# Patient Record
Sex: MALE | Race: WHITE | NOT HISPANIC OR LATINO | Employment: OTHER | URBAN - METROPOLITAN AREA
[De-identification: names, ages, dates, MRNs, and addresses within clinical notes are randomized per-mention and may not be internally consistent; named-entity substitution may affect disease eponyms.]

---

## 2017-01-17 PROBLEM — Z13.9 SCREENING: Status: ACTIVE | Noted: 2017-01-17

## 2019-02-04 PROBLEM — I10 ESSENTIAL HYPERTENSION: Status: ACTIVE | Noted: 2019-02-04

## 2019-02-04 PROBLEM — E78.5 HYPERLIPIDEMIA: Status: ACTIVE | Noted: 2019-02-04

## 2020-01-06 PROBLEM — Z13.9 ENCOUNTER FOR HEALTH-RELATED SCREENING: Status: RESOLVED | Noted: 2017-01-17 | Resolved: 2020-01-06

## 2022-01-27 ENCOUNTER — TELEPHONE (OUTPATIENT)
Dept: FAMILY MEDICINE | Facility: CLINIC | Age: 62
End: 2022-01-27
Payer: COMMERCIAL

## 2022-01-31 PROBLEM — D68.9 COAGULATION DEFECT: Status: ACTIVE | Noted: 2022-01-31

## 2022-01-31 PROBLEM — R07.9 CHEST PAIN, MODERATE CORONARY ARTERY RISK: Status: ACTIVE | Noted: 2022-01-31

## 2022-01-31 PROBLEM — J12.82 PNEUMONIA DUE TO COVID-19 VIRUS: Status: ACTIVE | Noted: 2022-01-31

## 2022-01-31 PROBLEM — U07.1 PNEUMONIA DUE TO COVID-19 VIRUS: Status: ACTIVE | Noted: 2022-01-31

## 2022-02-01 DIAGNOSIS — R07.9 CHEST PAIN, MODERATE CORONARY ARTERY RISK: Primary | ICD-10-CM

## 2022-02-22 ENCOUNTER — OFFICE VISIT (OUTPATIENT)
Dept: SPINE | Facility: CLINIC | Age: 62
End: 2022-02-22
Payer: COMMERCIAL

## 2022-02-22 VITALS
BODY MASS INDEX: 42.46 KG/M2 | DIASTOLIC BLOOD PRESSURE: 79 MMHG | HEART RATE: 58 BPM | SYSTOLIC BLOOD PRESSURE: 148 MMHG | HEIGHT: 68 IN | WEIGHT: 280.19 LBS

## 2022-02-22 DIAGNOSIS — Z98.890 H/O CERVICAL SPINE SURGERY: Primary | ICD-10-CM

## 2022-02-22 DIAGNOSIS — M50.30 DDD (DEGENERATIVE DISC DISEASE), CERVICAL: ICD-10-CM

## 2022-02-22 DIAGNOSIS — M54.12 CERVICAL RADICULOPATHY: ICD-10-CM

## 2022-02-22 PROCEDURE — 1159F MED LIST DOCD IN RCRD: CPT | Mod: CPTII,S$GLB,, | Performed by: PHYSICIAN ASSISTANT

## 2022-02-22 PROCEDURE — 1160F RVW MEDS BY RX/DR IN RCRD: CPT | Mod: CPTII,S$GLB,, | Performed by: PHYSICIAN ASSISTANT

## 2022-02-22 PROCEDURE — 3044F PR MOST RECENT HEMOGLOBIN A1C LEVEL <7.0%: ICD-10-PCS | Mod: CPTII,S$GLB,, | Performed by: PHYSICIAN ASSISTANT

## 2022-02-22 PROCEDURE — 3077F PR MOST RECENT SYSTOLIC BLOOD PRESSURE >= 140 MM HG: ICD-10-PCS | Mod: CPTII,S$GLB,, | Performed by: PHYSICIAN ASSISTANT

## 2022-02-22 PROCEDURE — 99203 PR OFFICE/OUTPT VISIT, NEW, LEVL III, 30-44 MIN: ICD-10-PCS | Mod: S$GLB,,, | Performed by: PHYSICIAN ASSISTANT

## 2022-02-22 PROCEDURE — 4010F ACE/ARB THERAPY RXD/TAKEN: CPT | Mod: CPTII,S$GLB,, | Performed by: PHYSICIAN ASSISTANT

## 2022-02-22 PROCEDURE — 99999 PR PBB SHADOW E&M-EST. PATIENT-LVL V: ICD-10-PCS | Mod: PBBFAC,,, | Performed by: PHYSICIAN ASSISTANT

## 2022-02-22 PROCEDURE — 3044F HG A1C LEVEL LT 7.0%: CPT | Mod: CPTII,S$GLB,, | Performed by: PHYSICIAN ASSISTANT

## 2022-02-22 PROCEDURE — 3078F PR MOST RECENT DIASTOLIC BLOOD PRESSURE < 80 MM HG: ICD-10-PCS | Mod: CPTII,S$GLB,, | Performed by: PHYSICIAN ASSISTANT

## 2022-02-22 PROCEDURE — 3078F DIAST BP <80 MM HG: CPT | Mod: CPTII,S$GLB,, | Performed by: PHYSICIAN ASSISTANT

## 2022-02-22 PROCEDURE — 99203 OFFICE O/P NEW LOW 30 MIN: CPT | Mod: S$GLB,,, | Performed by: PHYSICIAN ASSISTANT

## 2022-02-22 PROCEDURE — 3077F SYST BP >= 140 MM HG: CPT | Mod: CPTII,S$GLB,, | Performed by: PHYSICIAN ASSISTANT

## 2022-02-22 PROCEDURE — 3008F BODY MASS INDEX DOCD: CPT | Mod: CPTII,S$GLB,, | Performed by: PHYSICIAN ASSISTANT

## 2022-02-22 PROCEDURE — 3008F PR BODY MASS INDEX (BMI) DOCUMENTED: ICD-10-PCS | Mod: CPTII,S$GLB,, | Performed by: PHYSICIAN ASSISTANT

## 2022-02-22 PROCEDURE — 1159F PR MEDICATION LIST DOCUMENTED IN MEDICAL RECORD: ICD-10-PCS | Mod: CPTII,S$GLB,, | Performed by: PHYSICIAN ASSISTANT

## 2022-02-22 PROCEDURE — 4010F PR ACE/ARB THEARPY RXD/TAKEN: ICD-10-PCS | Mod: CPTII,S$GLB,, | Performed by: PHYSICIAN ASSISTANT

## 2022-02-22 PROCEDURE — 99999 PR PBB SHADOW E&M-EST. PATIENT-LVL V: CPT | Mod: PBBFAC,,, | Performed by: PHYSICIAN ASSISTANT

## 2022-02-22 PROCEDURE — 1160F PR REVIEW ALL MEDS BY PRESCRIBER/CLIN PHARMACIST DOCUMENTED: ICD-10-PCS | Mod: CPTII,S$GLB,, | Performed by: PHYSICIAN ASSISTANT

## 2022-02-22 NOTE — PROGRESS NOTES
Back and Spine Consult    Patient ID: Oc Solis is a 61 y.o. male.    Chief Complaint   Patient presents with    Neck Pain     Surgery 2000. cervical radiculopathy, DDD cervical.       Review of Systems   Constitutional: Negative for activity change, chills, fatigue and unexpected weight change.   HENT: Negative for hearing loss, tinnitus, trouble swallowing and voice change.    Eyes: Negative for visual disturbance.   Respiratory: Negative for apnea, chest tightness and shortness of breath.    Cardiovascular: Negative for chest pain and palpitations.   Gastrointestinal: Negative for abdominal pain, constipation, diarrhea, nausea and vomiting.   Genitourinary: Negative for difficulty urinating, dysuria and frequency.   Musculoskeletal: Positive for myalgias and neck pain. Negative for back pain, gait problem and neck stiffness.   Skin: Negative for wound.   Neurological: Positive for numbness. Negative for dizziness, tremors, seizures, facial asymmetry, speech difficulty, weakness, light-headedness and headaches.   Psychiatric/Behavioral: Negative for confusion and decreased concentration.       Past Medical History:   Diagnosis Date    Anxiety     Asthma     childhood    Candidiasis of skin and nails     Hyperglycemia     Hyperlipidemia     Hypertension     Laceration     Neck pain     Rectal/anal hemorrhage     Unspecified adverse effect of unspecified drug, medicinal and biological substance      Social History     Socioeconomic History    Marital status:    Tobacco Use    Smoking status: Never Smoker    Smokeless tobacco: Never Used   Substance and Sexual Activity    Alcohol use: Yes     Comment: 1 bottle of wine per week now pt reports    Drug use: No    Sexual activity: Yes     Partners: Female     Family History   Problem Relation Age of Onset    Thyroid disease Mother     Cancer Mother         lung    Hyperlipidemia Father     Heart disease Father      Review of patient's  allergies indicates:   Allergen Reactions    Percocet [oxycodone-acetaminophen] Itching    Penicillins Rash       Current Outpatient Medications:     acetaminophen (TYLENOL) 500 MG tablet, Take 1,000 mg by mouth every 6 (six) hours as needed for Pain., Disp: , Rfl:     albuterol (PROVENTIL/VENTOLIN HFA) 90 mcg/actuation inhaler, Inhale 2 puffs into the lungs every 4 (four) hours as needed for Wheezing or Shortness of Breath. Rescue, Disp: 18 g, Rfl: 1    aspirin (ECOTRIN) 81 MG EC tablet, Take 1 tablet (81 mg total) by mouth once daily., Disp: , Rfl: 0    blood sugar diagnostic Strp, To check BG onc times daily, to use with insurance preferred meter, Disp: 50 each, Rfl: 9    blood-glucose meter kit, Use to check glucose daily, Disp: 1 each, Rfl: 0    diazePAM (VALIUM) 5 MG tablet, Take 1 tablet (5 mg total) by mouth nightly as needed for Anxiety., Disp: 30 tablet, Rfl: 0    HYDROcodone-acetaminophen (NORCO) 7.5-325 mg per tablet, Take 1 tablet by mouth every 6 (six) hours as needed for Pain., Disp: 30 tablet, Rfl: 0    lancets Misc, To check BG one times daily, to use with insurance preferred meter, Disp: 50 each, Rfl: 9    losartan (COZAAR) 50 MG tablet, Take 1 tablet (50 mg total) by mouth once daily. (Patient taking differently: Take 50 mg by mouth every evening.), Disp: 90 tablet, Rfl: 3    metFORMIN (GLUCOPHAGE) 500 MG tablet, Take 1 tablet (500 mg total) by mouth 2 (two) times daily with meals., Disp: 180 tablet, Rfl: 3    metoprolol tartrate (LOPRESSOR) 25 MG tablet, Take 1 tablet (25 mg total) by mouth 3 (three) times daily., Disp: 90 tablet, Rfl: 1    omeprazole (PRILOSEC) 20 MG capsule, Take 1 capsule (20 mg total) by mouth daily as needed (heartburn)., Disp: 90 capsule, Rfl: 1    rosuvastatin (CRESTOR) 5 MG tablet, Take 1 tablet (5 mg total) by mouth once daily., Disp: 90 tablet, Rfl: 3    tiZANidine (ZANAFLEX) 4 MG tablet, Take 4 mg by mouth every evening., Disp: , Rfl:     Vitals:     "02/22/22 1532   BP: (!) 148/79   BP Location: Right arm   Patient Position: Sitting   BP Method: Large (Automatic)   Pulse: (!) 58   Weight: 127.1 kg (280 lb 3.3 oz)   Height: 5' 8" (1.727 m)       Physical Exam  Vitals and nursing note reviewed.   Constitutional:       Appearance: He is well-developed.   HENT:      Head: Normocephalic and atraumatic.   Eyes:      Pupils: Pupils are equal, round, and reactive to light.   Cardiovascular:      Rate and Rhythm: Normal rate.   Pulmonary:      Effort: Pulmonary effort is normal.   Abdominal:      General: There is no distension.   Musculoskeletal:         General: Normal range of motion.      Cervical back: Normal range of motion and neck supple.   Skin:     General: Skin is warm and dry.   Neurological:      Mental Status: He is alert and oriented to person, place, and time.      Coordination: Finger-Nose-Finger Test, Heel to Shin Test and Romberg Test normal.      Gait: Gait is intact. Tandem walk normal.      Deep Tendon Reflexes:      Reflex Scores:       Tricep reflexes are 1+ on the right side and 1+ on the left side.       Bicep reflexes are 1+ on the right side and 1+ on the left side.       Brachioradialis reflexes are 1+ on the right side and 1+ on the left side.       Patellar reflexes are 1+ on the right side and 1+ on the left side.       Achilles reflexes are 1+ on the right side and 1+ on the left side.  Psychiatric:         Speech: Speech normal.         Behavior: Behavior normal.         Thought Content: Thought content normal.         Judgment: Judgment normal.         Neurologic Exam     Mental Status   Oriented to person, place, and time.   Oriented to person.   Oriented to place.   Oriented to time.   Follows 3 step commands.   Attention: normal. Concentration: normal.   Speech: speech is normal   Level of consciousness: alert  Knowledge: consistent with education.   Able to name object. Able to read. Able to repeat. Able to write. Normal " comprehension.     Cranial Nerves     CN II   Visual acuity: normal  Right visual field deficit: none  Left visual field deficit: none     CN III, IV, VI   Pupils are equal, round, and reactive to light.  Right pupil: Size: 3 mm. Shape: regular. Reactivity: brisk. Consensual response: intact.   Left pupil: Size: 3 mm. Shape: regular. Reactivity: brisk. Consensual response: intact.   CN III: no CN III palsy  CN VI: no CN VI palsy  Nystagmus: none   Diplopia: none  Ophthalmoparesis: none  Conjugate gaze: present    CN V   Right facial sensation deficit: none  Left facial sensation deficit: none    CN VII   Right facial weakness: none  Left facial weakness: none    CN VIII   Hearing: intact    CN IX, X   CN IX normal.   CN X normal.     CN XI   Right sternocleidomastoid strength: normal  Left sternocleidomastoid strength: normal  Right trapezius strength: normal  Left trapezius strength: normal    CN XII   Fasciculations: absent  Tongue deviation: none    Motor Exam   Muscle bulk: normal  Overall muscle tone: normal  Right arm pronator drift: absent  Left arm pronator drift: absent    Strength   Right neck flexion: 5/5  Left neck flexion: 5/5  Right neck extension: 5/5  Left neck extension: 5/5  Right deltoid: 5/5  Left deltoid: 5/5  Right biceps: 5/5  Left biceps: 5/5  Right triceps: 5/5  Left triceps: 5/5  Right wrist flexion: 5/5  Left wrist flexion: 5/5  Right wrist extension: 5/5  Left wrist extension: 5/5  Right interossei: 5/5  Left interossei: 5/5  Right abdominals: 5/5  Left abdominals: 5/5  Right iliopsoas: 5/5  Left iliopsoas: 5/5  Right quadriceps: 5/5  Left quadriceps: 5/5  Right hamstrin/5  Left hamstrin/5  Right glutei: 5/5  Left glutei: 5/5  Right anterior tibial: 5/5  Left anterior tibial: 5/5  Right posterior tibial: 5/5  Left posterior tibial: 5/5  Right peroneal: 5/5  Left peroneal: 5/5  Right gastroc: 5/5  Left gastroc: 5/5    Sensory Exam   Right arm light touch: normal  Left arm light  touch: normal  Right leg light touch: normal  Left leg light touch: normal  Right arm vibration: normal  Left arm vibration: normal  Right arm pinprick: normal  Left arm pinprick: normal    Gait, Coordination, and Reflexes     Gait  Gait: normal    Coordination   Romberg: negative  Finger to nose coordination: normal  Heel to shin coordination: normal  Tandem walking coordination: normal    Tremor   Resting tremor: absent  Intention tremor: absent  Action tremor: absent    Reflexes   Right brachioradialis: 1+  Left brachioradialis: 1+  Right biceps: 1+  Left biceps: 1+  Right triceps: 1+  Left triceps: 1+  Right patellar: 1+  Left patellar: 1+  Right achilles: 1+  Left achilles: 1+  Right : 1+  Left : 1+  Right Sandra: absent  Left Sandra: absent  Right ankle clonus: absent  Left ankle clonus: absent      Provider dictation:    61 year old male with anxiety, asthma, hypertension, and hyperlipidemia is referred by Lisa Gonzalez for evaluation of neck and arm pain.  He underwent cervical spine fusion in 2004 at C4/5 per his report.  His current pain is worse than what he experienced prior to surgery.  Pain is felt in the posterior neck and interscapular region.  He has pain and numbness in the right greater than left arm from the elbow to the digits 3,4,5.  He is taking norco and zanaflex and ibuprofen for pain.  Pain improves with movement/ walking and with raising the arms above the head.    Current medications:  Norco, zanaflex, ibuprofen 800mg q 8 hours  Physical therapy:  none  Interventional Procedures:  none     On exam, there is 5/5 strength with 1+ DTR and no sensory deficits.  Gait and station fluid.  Denies bowel/ bladder dysfunction.  Full range of motion of the upper and lower extremities. No pain with axial facet loading.  No SI joint pain on palpation.  No pain with lumbar flexion/ extension.  She has positive phalens at the right greater than left elbow.    No imaging.    Mr. Renae has  history of cervical spine surgery with worsening neck pain and bilateral right greater than left arm pain.  Cerivicalgia with cervical radiculopathy vs ulnar neuropathy.    Given degree of pain even with taking pain medciations, and no relief, I recommend further assessment with MRI and xray of the cervical spine to assess for neural compression.  Determine if more cervical radiculopathy or possibly ulnar neuropathy.  Follow up after imaging.      Visit Diagnosis:  H/O cervical spine surgery  -     X-Ray Cervical Spine 5 View W Flex Extxt; Future; Expected date: 02/22/2022  -     MRI Cervical Spine Without Contrast; Future; Expected date: 02/22/2022    Cervical radiculopathy  -     Ambulatory referral/consult to [facility]  -     X-Ray Cervical Spine 5 View W Flex Extxt; Future; Expected date: 02/22/2022  -     MRI Cervical Spine Without Contrast; Future; Expected date: 02/22/2022    DDD (degenerative disc disease), cervical  -     Ambulatory referral/consult to [facility] Clinic  -     X-Ray Cervical Spine 5 View W Flex Extxt; Future; Expected date: 02/22/2022  -     MRI Cervical Spine Without Contrast; Future; Expected date: 02/22/2022        Total time spent counseling greater than fifty percent of total visit time.  Counseling included discussion regarding imaging findings, diagnosis possibilities, treatment options, risks and benefits.   The patient had many questions regarding the options and long-term effects.

## 2022-02-23 ENCOUNTER — HOSPITAL ENCOUNTER (OUTPATIENT)
Dept: RADIOLOGY | Facility: HOSPITAL | Age: 62
Discharge: HOME | End: 2022-02-23
Attending: PHYSICIAN ASSISTANT
Payer: COMMERCIAL

## 2022-02-23 DIAGNOSIS — M50.30 DDD (DEGENERATIVE DISC DISEASE), CERVICAL: ICD-10-CM

## 2022-02-23 DIAGNOSIS — M54.12 CERVICAL RADICULOPATHY: ICD-10-CM

## 2022-02-23 DIAGNOSIS — Z98.890 H/O CERVICAL SPINE SURGERY: ICD-10-CM

## 2022-02-23 PROCEDURE — 72052 X-RAY EXAM NECK SPINE 6/>VWS: CPT | Mod: 26,,, | Performed by: RADIOLOGY

## 2022-02-23 PROCEDURE — 72052 XR CERVICAL SPINE 5 VIEW WITH FLEX AND EXT: ICD-10-PCS | Mod: 26,,, | Performed by: RADIOLOGY

## 2022-02-23 PROCEDURE — 72141 MRI NECK SPINE W/O DYE: CPT | Mod: 26,,, | Performed by: RADIOLOGY

## 2022-02-23 PROCEDURE — 72141 MRI CERVICAL SPINE WITHOUT CONTRAST: ICD-10-PCS | Mod: 26,,, | Performed by: RADIOLOGY

## 2022-02-23 PROCEDURE — 72052 X-RAY EXAM NECK SPINE 6/>VWS: CPT | Mod: TC,FY,PO

## 2022-02-23 PROCEDURE — 72141 MRI NECK SPINE W/O DYE: CPT | Mod: TC,PO

## 2022-02-24 ENCOUNTER — OFFICE VISIT (OUTPATIENT)
Dept: SPINE | Facility: CLINIC | Age: 62
End: 2022-02-24
Payer: COMMERCIAL

## 2022-02-24 VITALS
BODY MASS INDEX: 42.46 KG/M2 | SYSTOLIC BLOOD PRESSURE: 113 MMHG | WEIGHT: 280.19 LBS | DIASTOLIC BLOOD PRESSURE: 74 MMHG | HEIGHT: 68 IN | HEART RATE: 54 BPM

## 2022-02-24 DIAGNOSIS — M54.12 CERVICAL RADICULOPATHY: Primary | ICD-10-CM

## 2022-02-24 DIAGNOSIS — M47.812 CERVICAL SPONDYLOSIS: ICD-10-CM

## 2022-02-24 DIAGNOSIS — Z98.890 H/O CERVICAL SPINE SURGERY: ICD-10-CM

## 2022-02-24 PROCEDURE — 3074F PR MOST RECENT SYSTOLIC BLOOD PRESSURE < 130 MM HG: ICD-10-PCS | Mod: CPTII,S$GLB,, | Performed by: PHYSICIAN ASSISTANT

## 2022-02-24 PROCEDURE — 1159F PR MEDICATION LIST DOCUMENTED IN MEDICAL RECORD: ICD-10-PCS | Mod: CPTII,S$GLB,, | Performed by: PHYSICIAN ASSISTANT

## 2022-02-24 PROCEDURE — 4010F ACE/ARB THERAPY RXD/TAKEN: CPT | Mod: CPTII,S$GLB,, | Performed by: PHYSICIAN ASSISTANT

## 2022-02-24 PROCEDURE — 3074F SYST BP LT 130 MM HG: CPT | Mod: CPTII,S$GLB,, | Performed by: PHYSICIAN ASSISTANT

## 2022-02-24 PROCEDURE — 1160F PR REVIEW ALL MEDS BY PRESCRIBER/CLIN PHARMACIST DOCUMENTED: ICD-10-PCS | Mod: CPTII,S$GLB,, | Performed by: PHYSICIAN ASSISTANT

## 2022-02-24 PROCEDURE — 3044F HG A1C LEVEL LT 7.0%: CPT | Mod: CPTII,S$GLB,, | Performed by: PHYSICIAN ASSISTANT

## 2022-02-24 PROCEDURE — 3078F DIAST BP <80 MM HG: CPT | Mod: CPTII,S$GLB,, | Performed by: PHYSICIAN ASSISTANT

## 2022-02-24 PROCEDURE — 99999 PR PBB SHADOW E&M-EST. PATIENT-LVL V: ICD-10-PCS | Mod: PBBFAC,,, | Performed by: PHYSICIAN ASSISTANT

## 2022-02-24 PROCEDURE — 4010F PR ACE/ARB THEARPY RXD/TAKEN: ICD-10-PCS | Mod: CPTII,S$GLB,, | Performed by: PHYSICIAN ASSISTANT

## 2022-02-24 PROCEDURE — 99214 PR OFFICE/OUTPT VISIT, EST, LEVL IV, 30-39 MIN: ICD-10-PCS | Mod: S$GLB,,, | Performed by: PHYSICIAN ASSISTANT

## 2022-02-24 PROCEDURE — 99214 OFFICE O/P EST MOD 30 MIN: CPT | Mod: S$GLB,,, | Performed by: PHYSICIAN ASSISTANT

## 2022-02-24 PROCEDURE — 1159F MED LIST DOCD IN RCRD: CPT | Mod: CPTII,S$GLB,, | Performed by: PHYSICIAN ASSISTANT

## 2022-02-24 PROCEDURE — 3044F PR MOST RECENT HEMOGLOBIN A1C LEVEL <7.0%: ICD-10-PCS | Mod: CPTII,S$GLB,, | Performed by: PHYSICIAN ASSISTANT

## 2022-02-24 PROCEDURE — 3078F PR MOST RECENT DIASTOLIC BLOOD PRESSURE < 80 MM HG: ICD-10-PCS | Mod: CPTII,S$GLB,, | Performed by: PHYSICIAN ASSISTANT

## 2022-02-24 PROCEDURE — 99999 PR PBB SHADOW E&M-EST. PATIENT-LVL V: CPT | Mod: PBBFAC,,, | Performed by: PHYSICIAN ASSISTANT

## 2022-02-24 PROCEDURE — 3008F PR BODY MASS INDEX (BMI) DOCUMENTED: ICD-10-PCS | Mod: CPTII,S$GLB,, | Performed by: PHYSICIAN ASSISTANT

## 2022-02-24 PROCEDURE — 1160F RVW MEDS BY RX/DR IN RCRD: CPT | Mod: CPTII,S$GLB,, | Performed by: PHYSICIAN ASSISTANT

## 2022-02-24 PROCEDURE — 3008F BODY MASS INDEX DOCD: CPT | Mod: CPTII,S$GLB,, | Performed by: PHYSICIAN ASSISTANT

## 2022-02-24 NOTE — PROGRESS NOTES
Back and Spine Follow Up    Patient ID: Oc Solis is a 61 y.o. male.    Chief Complaint   Patient presents with    Follow-up     MRI & X-ray results for neck pain.       Review of Systems   Constitutional: Negative for activity change, chills, fatigue and unexpected weight change.   HENT: Negative for hearing loss, tinnitus, trouble swallowing and voice change.    Eyes: Negative for visual disturbance.   Respiratory: Negative for apnea, chest tightness and shortness of breath.    Cardiovascular: Negative for chest pain and palpitations.   Gastrointestinal: Negative for abdominal pain, constipation, diarrhea, nausea and vomiting.   Genitourinary: Negative for difficulty urinating, dysuria and frequency.   Musculoskeletal: Positive for myalgias and neck pain. Negative for back pain, gait problem and neck stiffness.   Skin: Negative for wound.   Neurological: Positive for numbness. Negative for dizziness, tremors, seizures, facial asymmetry, speech difficulty, weakness, light-headedness and headaches.   Psychiatric/Behavioral: Negative for confusion and decreased concentration.       Past Medical History:   Diagnosis Date    Anxiety     Asthma     childhood    Candidiasis of skin and nails     Hyperglycemia     Hyperlipidemia     Hypertension     Laceration     Neck pain     Rectal/anal hemorrhage     Unspecified adverse effect of unspecified drug, medicinal and biological substance      Social History     Socioeconomic History    Marital status:    Tobacco Use    Smoking status: Never Smoker    Smokeless tobacco: Never Used   Substance and Sexual Activity    Alcohol use: Yes     Comment: 1 bottle of wine per week now pt reports    Drug use: No    Sexual activity: Yes     Partners: Female     Family History   Problem Relation Age of Onset    Thyroid disease Mother     Cancer Mother         lung    Hyperlipidemia Father     Heart disease Father      Review of patient's allergies  indicates:   Allergen Reactions    Percocet [oxycodone-acetaminophen] Itching    Penicillins Rash       Current Outpatient Medications:     acetaminophen (TYLENOL) 500 MG tablet, Take 1,000 mg by mouth every 6 (six) hours as needed for Pain., Disp: , Rfl:     albuterol (PROVENTIL/VENTOLIN HFA) 90 mcg/actuation inhaler, Inhale 2 puffs into the lungs every 4 (four) hours as needed for Wheezing or Shortness of Breath. Rescue, Disp: 18 g, Rfl: 1    aspirin (ECOTRIN) 81 MG EC tablet, Take 1 tablet (81 mg total) by mouth once daily., Disp: , Rfl: 0    blood sugar diagnostic Strp, To check BG onc times daily, to use with insurance preferred meter, Disp: 50 each, Rfl: 9    blood-glucose meter kit, Use to check glucose daily, Disp: 1 each, Rfl: 0    diazePAM (VALIUM) 5 MG tablet, Take 1 tablet (5 mg total) by mouth nightly as needed for Anxiety., Disp: 30 tablet, Rfl: 0    HYDROcodone-acetaminophen (NORCO) 7.5-325 mg per tablet, Take 1 tablet by mouth every 6 (six) hours as needed for Pain., Disp: 30 tablet, Rfl: 0    lancets Misc, To check BG one times daily, to use with insurance preferred meter, Disp: 50 each, Rfl: 9    losartan (COZAAR) 50 MG tablet, Take 1 tablet (50 mg total) by mouth once daily. (Patient taking differently: Take 50 mg by mouth every evening.), Disp: 90 tablet, Rfl: 3    metFORMIN (GLUCOPHAGE) 500 MG tablet, Take 1 tablet (500 mg total) by mouth 2 (two) times daily with meals., Disp: 180 tablet, Rfl: 3    metoprolol tartrate (LOPRESSOR) 25 MG tablet, Take 1 tablet (25 mg total) by mouth 3 (three) times daily., Disp: 90 tablet, Rfl: 1    omeprazole (PRILOSEC) 20 MG capsule, Take 1 capsule (20 mg total) by mouth daily as needed (heartburn)., Disp: 90 capsule, Rfl: 1    rosuvastatin (CRESTOR) 5 MG tablet, Take 1 tablet (5 mg total) by mouth once daily., Disp: 90 tablet, Rfl: 3    tiZANidine (ZANAFLEX) 4 MG tablet, Take 4 mg by mouth every evening., Disp: , Rfl:     Vitals:    02/24/22 1117  "  BP: 113/74   BP Location: Right arm   Patient Position: Sitting   BP Method: Large (Automatic)   Pulse: (!) 54   Weight: 127.1 kg (280 lb 3.3 oz)   Height: 5' 8" (1.727 m)       Physical Exam  Vitals and nursing note reviewed.   Constitutional:       Appearance: He is well-developed.   HENT:      Head: Normocephalic and atraumatic.   Eyes:      Pupils: Pupils are equal, round, and reactive to light.   Cardiovascular:      Rate and Rhythm: Normal rate.   Pulmonary:      Effort: Pulmonary effort is normal.   Abdominal:      General: There is no distension.   Musculoskeletal:         General: Normal range of motion.      Cervical back: Normal range of motion and neck supple.   Skin:     General: Skin is warm and dry.   Neurological:      Mental Status: He is alert and oriented to person, place, and time.      Coordination: Finger-Nose-Finger Test, Heel to Shin Test and Romberg Test normal.      Gait: Gait is intact. Tandem walk normal.      Deep Tendon Reflexes:      Reflex Scores:       Tricep reflexes are 1+ on the right side and 1+ on the left side.       Bicep reflexes are 1+ on the right side and 1+ on the left side.       Brachioradialis reflexes are 1+ on the right side and 1+ on the left side.       Patellar reflexes are 1+ on the right side and 1+ on the left side.       Achilles reflexes are 1+ on the right side and 1+ on the left side.  Psychiatric:         Speech: Speech normal.         Behavior: Behavior normal.         Thought Content: Thought content normal.         Judgment: Judgment normal.         Neurologic Exam     Mental Status   Oriented to person, place, and time.   Oriented to person.   Oriented to place.   Oriented to time.   Follows 3 step commands.   Attention: normal. Concentration: normal.   Speech: speech is normal   Level of consciousness: alert  Knowledge: consistent with education.   Able to name object. Able to read. Able to repeat. Able to write. Normal comprehension.     Cranial " Nerves     CN II   Visual acuity: normal  Right visual field deficit: none  Left visual field deficit: none     CN III, IV, VI   Pupils are equal, round, and reactive to light.  Right pupil: Size: 3 mm. Shape: regular. Reactivity: brisk. Consensual response: intact.   Left pupil: Size: 3 mm. Shape: regular. Reactivity: brisk. Consensual response: intact.   CN III: no CN III palsy  CN VI: no CN VI palsy  Nystagmus: none   Diplopia: none  Ophthalmoparesis: none  Conjugate gaze: present    CN V   Right facial sensation deficit: none  Left facial sensation deficit: none    CN VII   Right facial weakness: none  Left facial weakness: none    CN VIII   Hearing: intact    CN IX, X   CN IX normal.   CN X normal.     CN XI   Right sternocleidomastoid strength: normal  Left sternocleidomastoid strength: normal  Right trapezius strength: normal  Left trapezius strength: normal    CN XII   Fasciculations: absent  Tongue deviation: none    Motor Exam   Muscle bulk: normal  Overall muscle tone: normal  Right arm pronator drift: absent  Left arm pronator drift: absent    Strength   Right neck flexion: 5/5  Left neck flexion: 5/5  Right neck extension: 5/5  Left neck extension: 5/5  Right deltoid: 5/5  Left deltoid: 5/5  Right biceps: 5/5  Left biceps: 5/5  Right triceps: 5/5  Left triceps: 5/5  Right wrist flexion: 5/5  Left wrist flexion: 5/5  Right wrist extension: 5/5  Left wrist extension: 5/5  Right interossei: 5/5  Left interossei: 5/5  Right abdominals: 5/5  Left abdominals: 5/5  Right iliopsoas: 5/5  Left iliopsoas: 5/5  Right quadriceps: 5/5  Left quadriceps: 5/5  Right hamstrin/5  Left hamstrin/5  Right glutei: 5/5  Left glutei: 5/5  Right anterior tibial: 5/5  Left anterior tibial: 5/5  Right posterior tibial: 5/5  Left posterior tibial: 5/5  Right peroneal: 5/5  Left peroneal: 5/5  Right gastroc: 5/5  Left gastroc: 5/5    Sensory Exam   Right arm light touch: normal  Left arm light touch: normal  Right leg light  touch: normal  Left leg light touch: normal  Right arm vibration: normal  Left arm vibration: normal  Right arm pinprick: normal  Left arm pinprick: normal    Gait, Coordination, and Reflexes     Gait  Gait: normal    Coordination   Romberg: negative  Finger to nose coordination: normal  Heel to shin coordination: normal  Tandem walking coordination: normal    Tremor   Resting tremor: absent  Intention tremor: absent  Action tremor: absent    Reflexes   Right brachioradialis: 1+  Left brachioradialis: 1+  Right biceps: 1+  Left biceps: 1+  Right triceps: 1+  Left triceps: 1+  Right patellar: 1+  Left patellar: 1+  Right achilles: 1+  Left achilles: 1+  Right : 1+  Left : 1+  Right Sandra: absent  Left Sandra: absent  Right ankle clonus: absent  Left ankle clonus: absent      Provider dictation:    61 year old male with anxiety, asthma, hypertension, and hyperlipidemia presents for follow up of neck and arm pain after undergoing imaging.  He had cervical spine fusion in 2004.  His current pain is worse than what he experienced prior to surgery.  Pain is felt in the posterior neck and interscapular region.  He has pain and numbness in the right greater than left arm from the elbow to the digits 3,4,5.  He is taking norco and zanaflex and ibuprofen for pain.  Pain improves with movement/ walking and with raising the arms above the head.  No change in pain since last visit.    Current medications:  Norco, zanaflex, ibuprofen 800mg q 8 hours  Physical therapy:  none  Interventional Procedures:  none     On exam, there is 5/5 strength with 1+ DTR and no sensory deficits.  Gait and station fluid.  Denies bowel/ bladder dysfunction.  Full range of motion of the upper and lower extremities. No pain with axial facet loading.  No SI joint pain on palpation.  No pain with lumbar flexion/ extension.  She has positive phalens at the right greater than left elbow.    Ray cervical spine flexion-extension views and MRI  cervical spine from 02/22/2022 reviewed.  Multilevel degenerative changes present without instability on flexion or extension.  Post op anterior osseous fusion changes noted at C6-7.  C3-4 disc osteophyte complex and facet arthropathy with severe bilateral foraminal narrowing.  C4-5 and C5-6 disc osteophyte complex and facet arthropathy be producing severe left greater than right foraminal narrowing and central canal stenosis at both areas.  C6-7 disc osteophyte complex with bilateral foraminal stenosis.  C7-T1 disc osteophyte complex and facet arthropathy producing moderate to severe central canal stenosis and significant bilateral foraminal narrowing.    Mr. Renae has history of cervical spine surgery with worsening neck pain and bilateral right greater than left arm pain.  At this time pain most likely from degenerative changes in the neck, particularly adjacent level disease at C6/7.  There is also significant stenosis and spondylosis at C3-4, C4-5, C5-6.  We discussed all options including therapy as well as injections and further surgical intervention.  At this time he elects to proceed with epidural steroid injection and we will arrange for at C7-T1 DANNY.  Am also referring to neuro surgery given significant degree of foraminal and central canal stenosis due to degenerative changes for further evaluation.    Visit Diagnosis:  Cervical radiculopathy  -     Procedure Order to Pain Management; Future; Expected date: 02/24/2022  -     Ambulatory referral/consult to Neurosurgery; Future; Expected date: 03/03/2022    H/O cervical spine surgery  -     Procedure Order to Pain Management; Future; Expected date: 02/24/2022  -     Ambulatory referral/consult to Neurosurgery; Future; Expected date: 03/03/2022    Cervical spondylosis  -     Procedure Order to Pain Management; Future; Expected date: 02/24/2022  -     Ambulatory referral/consult to Neurosurgery; Future; Expected date: 03/03/2022        Total time spent  counseling greater than fifty percent of total visit time.  Counseling included discussion regarding imaging findings, diagnosis possibilities, treatment options, risks and benefits.   The patient had many questions regarding the options and long-term effects.

## 2022-02-25 ENCOUNTER — TELEPHONE (OUTPATIENT)
Dept: SPINE | Facility: CLINIC | Age: 62
End: 2022-02-25
Payer: COMMERCIAL

## 2022-02-25 NOTE — TELEPHONE ENCOUNTER
----- Message from Rosemary Pruitt sent at 2/25/2022  9:08 AM CST -----  Who Called: Patient    What is the reqeust in detail: Requesting call back to discuss getting a cervical steroid shot. Please advise.     Can the clinic reply by [facility]? No    Best Call Back Number: [phone]    Additional Information: Please advise.

## 2022-02-28 ENCOUNTER — TELEPHONE (OUTPATIENT)
Dept: SPINE | Facility: CLINIC | Age: 62
End: 2022-02-28
Payer: COMMERCIAL

## 2022-02-28 NOTE — TELEPHONE ENCOUNTER
----- Message from Kanika Sebastian sent at 2/28/2022  8:15 AM CST -----  Contact: pt  Type: Needs Medical Advice  Who Called:  pt   Best Call Back Number: [phone]    Additional Information: please call pt regarding scheduling cervical injection

## 2022-02-28 NOTE — TELEPHONE ENCOUNTER
----- Message from Haylee Milian LPN sent at 2/28/2022 11:40 AM CST -----    ----- Message -----  From: Pipo Santos MD  Sent: 2/24/2022   4:39 PM CST  To: Danielle Foss Staff    This patient was referred by Denise for cervical DANYN.  He has very significant narrowing in his spine at C7-T1.  He needs to be evaluated by Neurosurgery before we consider a cervical DANNY, I see he already has appointment scheduled.

## 2022-02-28 NOTE — TELEPHONE ENCOUNTER
I spoke with Mr. Solis and let him know that he will see neurosurgery first, and if the doctor there says it is ok for him to have a cervical DANNY then he will be scheduled for the procedure at that time. He indicated understanding.

## 2022-03-03 ENCOUNTER — OFFICE VISIT (OUTPATIENT)
Dept: NEUROSURGERY | Facility: CLINIC | Age: 62
End: 2022-03-03
Payer: COMMERCIAL

## 2022-03-03 ENCOUNTER — TELEPHONE (OUTPATIENT)
Dept: SPINE | Facility: CLINIC | Age: 62
End: 2022-03-03
Payer: COMMERCIAL

## 2022-03-03 VITALS
SYSTOLIC BLOOD PRESSURE: 116 MMHG | HEIGHT: 68 IN | HEART RATE: 59 BPM | RESPIRATION RATE: 18 BRPM | BODY MASS INDEX: 42.46 KG/M2 | DIASTOLIC BLOOD PRESSURE: 61 MMHG | WEIGHT: 280.19 LBS

## 2022-03-03 DIAGNOSIS — M54.12 CERVICAL RADICULOPATHY: ICD-10-CM

## 2022-03-03 DIAGNOSIS — Z98.890 H/O CERVICAL SPINE SURGERY: ICD-10-CM

## 2022-03-03 DIAGNOSIS — M47.812 CERVICAL SPONDYLOSIS: ICD-10-CM

## 2022-03-03 PROCEDURE — 99214 OFFICE O/P EST MOD 30 MIN: CPT | Mod: S$GLB,,, | Performed by: NURSE PRACTITIONER

## 2022-03-03 PROCEDURE — 3074F SYST BP LT 130 MM HG: CPT | Mod: CPTII,S$GLB,, | Performed by: NURSE PRACTITIONER

## 2022-03-03 PROCEDURE — 3044F HG A1C LEVEL LT 7.0%: CPT | Mod: CPTII,S$GLB,, | Performed by: NURSE PRACTITIONER

## 2022-03-03 PROCEDURE — 4010F ACE/ARB THERAPY RXD/TAKEN: CPT | Mod: CPTII,S$GLB,, | Performed by: NURSE PRACTITIONER

## 2022-03-03 PROCEDURE — 3044F PR MOST RECENT HEMOGLOBIN A1C LEVEL <7.0%: ICD-10-PCS | Mod: CPTII,S$GLB,, | Performed by: NURSE PRACTITIONER

## 2022-03-03 PROCEDURE — 3078F PR MOST RECENT DIASTOLIC BLOOD PRESSURE < 80 MM HG: ICD-10-PCS | Mod: CPTII,S$GLB,, | Performed by: NURSE PRACTITIONER

## 2022-03-03 PROCEDURE — 4010F PR ACE/ARB THEARPY RXD/TAKEN: ICD-10-PCS | Mod: CPTII,S$GLB,, | Performed by: NURSE PRACTITIONER

## 2022-03-03 PROCEDURE — 3078F DIAST BP <80 MM HG: CPT | Mod: CPTII,S$GLB,, | Performed by: NURSE PRACTITIONER

## 2022-03-03 PROCEDURE — 99999 PR PBB SHADOW E&M-EST. PATIENT-LVL IV: ICD-10-PCS | Mod: PBBFAC,,, | Performed by: NURSE PRACTITIONER

## 2022-03-03 PROCEDURE — 3074F PR MOST RECENT SYSTOLIC BLOOD PRESSURE < 130 MM HG: ICD-10-PCS | Mod: CPTII,S$GLB,, | Performed by: NURSE PRACTITIONER

## 2022-03-03 PROCEDURE — 3008F BODY MASS INDEX DOCD: CPT | Mod: CPTII,S$GLB,, | Performed by: NURSE PRACTITIONER

## 2022-03-03 PROCEDURE — 99214 PR OFFICE/OUTPT VISIT, EST, LEVL IV, 30-39 MIN: ICD-10-PCS | Mod: S$GLB,,, | Performed by: NURSE PRACTITIONER

## 2022-03-03 PROCEDURE — 3008F PR BODY MASS INDEX (BMI) DOCUMENTED: ICD-10-PCS | Mod: CPTII,S$GLB,, | Performed by: NURSE PRACTITIONER

## 2022-03-03 PROCEDURE — 99999 PR PBB SHADOW E&M-EST. PATIENT-LVL IV: CPT | Mod: PBBFAC,,, | Performed by: NURSE PRACTITIONER

## 2022-03-03 NOTE — TELEPHONE ENCOUNTER
----- Message from Yoly Miranda sent at 3/3/2022  4:13 PM CST -----  Contact: pt  Type: Needs Medical Advice    Who Called:pt  Best Call Back Number:[phone]  Additional  Information:pt would like a call back to discuss being scheduled for cervical steroid injection. Prefer a call from Nurse Lyon  Please Advise- Thank you

## 2022-03-03 NOTE — PROGRESS NOTES
Neurosurgery History & Physical    Patient ID: Oc Solis is a 61 y.o. male.    Chief Complaint   Patient presents with    Neck Pain     Numbness and tingling right hand more than left; patient states that at night pain is worst causes him to cry; no relief. 0 headaches       History of Present Illness:   Mr. Solis is a 61 year old male with HLD, HTN, anxiety, asthma, hyperglycemia who presents today for evaluation of neck and right arm pain. He had C6-7 cervical spine fusion in 2000 with Dr. Solorzano and lumbar spine surgery (unsure of levels) in 2014 with Dr. Verde. He reports he experienced the same symptoms as his current symptoms prior to surgery in 2000. Following surgery his symptoms completely resolved until the last 6 weeks. He denies any recent trauma or injury that precipitated the onset of the pain. He has neck pain with tingling in his arms from the elbow into his 3-5th digits in R>L arms. He also has decreased  strength. He feels this is worse in the right hand as well. His arm symptoms are more bothersome than his neck pain as this affects his ability to perform his daily activities.     He is taking Norco, zanaflex and ibuprofen for pain. He was evaluated by [facility] who recommended neurosurgical evaluation prior to proceeding with injections. He has not recently participated in PT but has in the past.     Review of Systems  Constitutional: Negative for activity change, chills, fatigue and unexpected weight change.   HENT: Negative for hearing loss, tinnitus, trouble swallowing and voice change.    Eyes: Negative for visual disturbance.   Respiratory: Negative for apnea, chest tightness and shortness of breath.    Cardiovascular: Negative for chest pain and palpitations.   Gastrointestinal: Negative for abdominal pain, constipation, diarrhea, nausea and vomiting.   Genitourinary: Negative for difficulty urinating, dysuria and frequency.   Musculoskeletal:  Positive for myalgias and neck pain. Negative for back pain, gait problem and neck stiffness.   Skin: Negative for wound.   Neurological: Positive for numbness and weakness. Negative for dizziness, tremors, seizures, facial asymmetry, speech difficulty, weakness, light-headedness and headaches.   Psychiatric/Behavioral: Negative for confusion and decreased concentration.     Past Medical History:   Diagnosis Date    Anxiety     Asthma     childhood    Candidiasis of skin and nails     Hyperglycemia     Hyperlipidemia     Hypertension     Laceration     Neck pain     Rectal/anal hemorrhage     Unspecified adverse effect of unspecified drug, medicinal and biological substance      Social History     Socioeconomic History    Marital status:    Tobacco Use    Smoking status: Never Smoker    Smokeless tobacco: Never Used   Substance and Sexual Activity    Alcohol use: Yes     Comment: 1 bottle of wine per week now pt reports    Drug use: No    Sexual activity: Yes     Partners: Female     Family History   Problem Relation Age of Onset    Thyroid disease Mother     Cancer Mother         lung    Hyperlipidemia Father     Heart disease Father      Review of patient's allergies indicates:   Allergen Reactions    Percocet [oxycodone-acetaminophen] Itching    Penicillins Rash       Current Outpatient Medications:     acetaminophen (TYLENOL) 500 MG tablet, Take 1,000 mg by mouth every 6 (six) hours as needed for Pain., Disp: , Rfl:     albuterol (PROVENTIL/VENTOLIN HFA) 90 mcg/actuation inhaler, Inhale 2 puffs into the lungs every 4 (four) hours as needed for Wheezing or Shortness of Breath. Rescue, Disp: 18 g, Rfl: 1    aspirin (ECOTRIN) 81 MG EC tablet, Take 1 tablet (81 mg total) by mouth once daily., Disp: , Rfl: 0    blood sugar diagnostic Strp, To check BG onc times daily, to use with insurance preferred meter, Disp: 50 each, Rfl: 9    blood-glucose meter kit, Use to check glucose daily,  "Disp: 1 each, Rfl: 0    diazePAM (VALIUM) 5 MG tablet, Take 1 tablet (5 mg total) by mouth nightly as needed for Anxiety., Disp: 30 tablet, Rfl: 0    HYDROcodone-acetaminophen (NORCO) 7.5-325 mg per tablet, Take 1 tablet by mouth every 6 (six) hours as needed for Pain., Disp: 30 tablet, Rfl: 0    lancets Misc, To check BG one times daily, to use with insurance preferred meter, Disp: 50 each, Rfl: 9    losartan (COZAAR) 50 MG tablet, Take 1 tablet (50 mg total) by mouth once daily. (Patient taking differently: Take 50 mg by mouth every evening.), Disp: 90 tablet, Rfl: 3    metFORMIN (GLUCOPHAGE) 500 MG tablet, Take 1 tablet (500 mg total) by mouth 2 (two) times daily with meals., Disp: 180 tablet, Rfl: 3    metoprolol tartrate (LOPRESSOR) 25 MG tablet, Take 1 tablet (25 mg total) by mouth 3 (three) times daily., Disp: 90 tablet, Rfl: 1    omeprazole (PRILOSEC) 20 MG capsule, Take 1 capsule (20 mg total) by mouth daily as needed (heartburn)., Disp: 90 capsule, Rfl: 1    rosuvastatin (CRESTOR) 5 MG tablet, Take 1 tablet (5 mg total) by mouth once daily., Disp: 90 tablet, Rfl: 3    tiZANidine (ZANAFLEX) 4 MG tablet, Take 4 mg by mouth every evening., Disp: , Rfl:   Blood pressure 116/61, pulse (!) 59, resp. rate 18, height 5' 8" (1.727 m), weight 127.1 kg (280 lb 3.3 oz).      Neurologic Exam     Mental Status   Oriented to person, place, and time.   Level of consciousness: alert    Cranial Nerves     CN III, IV, VI   Extraocular motions are normal.     Motor Exam   Muscle bulk: normal  Overall muscle tone: normal    Strength   Strength 5/5 except as noted.   4/5 right , triceps  4-/5 right wrist extension, wrist flexion     Sensory Exam   Right arm light touch: decreased from elbow    Gait, Coordination, and Reflexes     Gait  Gait: normal    Reflexes   Right Sandra: absent  Left Sandra: absent  Right ankle clonus: absent  Left ankle clonus: absent      Physical Exam  Constitutional:       Appearance: He " is well-developed.   HENT:      Head: Normocephalic and atraumatic.   Eyes:      Extraocular Movements: EOM normal.      Conjunctiva/sclera: Conjunctivae normal.   Abdominal:      Tenderness: There is no guarding.   Musculoskeletal:         General: Normal range of motion.      Cervical back: Normal range of motion.   Skin:     General: Skin is warm and dry.   Neurological:      Mental Status: He is alert and oriented to person, place, and time.      Gait: Gait is intact.         Imaging:   MRI cervical spine dated 2/23/22 personally reviewed and discussed with the patient.   FINDINGS:  Vertebral column: There is solid bony fusion of C6 and C7.  This was present on the prior study.  Vertebral body height is preserved.  There is no fracture.  There is marked disc space narrowing at the C3-4, C4-5, C5-6 levels with mild-to-moderate disc space narrowing at the C7-T1 level.  Disc space narrowing at the C3-4 level has progressed compared to the prior plain films.  There is 2 mm anterolisthesis of C3 on C4 and C7 on T1 with trace retrolisthesis of C5 on C6.  The discs are desiccated.  Baseline marrow signal is normal.  The odontoid process is intact     Spinal canal, cord, epidural space: The spinal canal is developmentally normal.  Multilevel degenerative change does result in spinal stenosis with flattening of the cord surface at several levels.  Cord signal is grossly normal but patient motion does limit evaluation.     Findings by level:     C2-3: There is right greater than left facet joint arthropathy with mild uncovertebral spurring and minimal disc bulge.  There is no spinal stenosis.  There is moderate right and mild left foraminal stenosis.     C3-4: There is marked disc space narrowing, trace anterolisthesis of C3 on C4.  There is bilateral facet joint arthropathy and uncovertebral spurring with a broad disc osteophyte complex which narrows the subarachnoid space.  There is mild-to-moderate spinal stenosis with  severe bilateral foraminal stenosis.     C4-5: There is marked disc space narrowing, bilateral uncovertebral spurring.  There is bilateral facet joint arthropathy.  There is a broad disc protrusion/osteophyte eccentric to the right.  There is narrowing of the ventral and dorsal subarachnoid space.  There is subtle flattening of the right ventral cord surface.  There is moderate spinal stenosis with severe bilateral foraminal stenosis.     C5-6: There is marked disc space narrowing.  There is trace retrolisthesis of C5 on C6 which is exaggerated by osteophyte formation.  There is marked bilateral, left greater than right, uncovertebral spurring and facet joint arthropathy.  There is a moderate broad disc osteophyte complex.  There is moderate spinal stenosis with severe bilateral, left greater than right, foraminal stenosis.     C6-7: There is solid bony fusion.  There is bilateral uncovertebral spurring with mild facet joint arthropathy.  There is mild spinal stenosis with marked bilateral foraminal stenosis.     C7-T1: There is disc space narrowing.  There is mild anterolisthesis of C7 on T1.  There is bilateral facet joint arthropathy with ligamentum flavum thickening and there is unroofing of a diffuse disc bulge.  There is moderate to severe spinal stenosis with severe bilateral foraminal stenosis.  There is flattening of the ventral cord surface.     Soft tissues, other: The prevertebral soft tissues are grossly normal.  The airway is patent.     Impression:     1. There is extensive multilevel degenerative change.  The findings are discussed in detail by level above.  The pertinent findings will be summarized.  There has been previous bony fusion of C6 and C7.  There is disc space narrowing at the C3-4, C4-5, C5-6 and C7-T1 levels.  There is some degree of uncovertebral spurring, facet joint arthropathy and disc osteophyte complex at most levels.  2. At the C2-3 level there is moderate right and mild left  foraminal stenosis without spinal stenosis.  3. At the C3-4 level there is mild-to-moderate spinal stenosis with severe bilateral foraminal stenosis.  4. At the C4-5 and C5-6 levels there is multifactorial moderate spinal stenosis with severe bilateral foraminal stenosis.  5. At the C6-7 level there is mild spinal stenosis but marked bilateral foraminal stenosis.  6. At the C7-T1 level there is moderate to severe spinal stenosis with severe bilateral foraminal stenosis.    No instability noted on cervical imaging.     Assessment & Plan:   1. Cervical radiculopathy  Ambulatory referral/consult to Neurosurgery   2. H/O cervical spine surgery  Ambulatory referral/consult to Neurosurgery   3. Cervical spondylosis  Ambulatory referral/consult to Neurosurgery       61 year old male with history of C6-7 bony fusion with progressive neck, arm pain, weakness. He has multilevel degenerative changes in the cervical spine and adjacent segment disease with severe stenosis on recent imaging. I have discussed this patient and imaging with Dr. Mercado. I also discussed options for treatment with the patient including surgery. He is hesitant to seriously consider surgical options at this time despite our discussion that symptoms could progress. He would like to attempt a cervical injection with the knowledge that this will be only to target pain as opposed to numbness/weakness. There is no contraindication for this from a neurosurgical standpoint. He may follow up with Dr. Mercado at any time to discuss surgery. He is agreeable to the plan. I will provide him with a short course of pain medication and gabapentin.

## 2022-03-04 ENCOUNTER — TELEPHONE (OUTPATIENT)
Dept: NEUROSURGERY | Facility: CLINIC | Age: 62
End: 2022-03-04
Payer: COMMERCIAL

## 2022-03-04 DIAGNOSIS — M54.12 CERVICAL RADICULOPATHY: Primary | ICD-10-CM

## 2022-03-04 NOTE — TELEPHONE ENCOUNTER
This patient saw CAROLYNE Mathews the other day and for him to have an DANNY injection he needed neurosurgery clearance. Patient is cleared to have an DANNY injection per Donna Angeles NP.

## 2022-03-04 NOTE — TELEPHONE ENCOUNTER
----- Message from Annika Luna PA-C sent at 3/4/2022 11:31 AM CST -----  Regarding: RE: refill  Contact: patient  Unable to assess in chart or on blue sheet if these medications would be sent. Please verify with Donna on Monday and she can send if needed.     ----- Message -----  From: Cheryle Banda LPN  Sent: 3/4/2022  10:24 AM CST  To: Donna Angeles NP  Subject: FW: refill                                         ----- Message -----  From: Lainey Esposito  Sent: 3/4/2022  10:08 AM CST  To: Karthik Thompson Staff  Subject: refill                                           Type:  RX Refill Request    Who Called:  patient  Refill or New Rx:  new  RX Name and Strength:  gabapentin and hydrocodone  How is the patient currently taking it? (ex. 1XDay):    Is this a 30 day or 90 day RX:  30  Preferred Pharmacy with phone number:    [id number] - [place] - [address] AT [facility]  [address]  [facility] [address]  Phone: [phone] Fax: [phone]  Local or Mail Order:  local  Ordering Provider:  Donna Angeles  Best Call Back Number:  [phone] (home)   Additional Information:  Patient states the pharmacy does not have the prescription request. Please call patient to advise.Thanks!

## 2022-03-08 ENCOUNTER — OFFICE VISIT (OUTPATIENT)
Dept: PAIN MEDICINE | Facility: CLINIC | Age: 62
End: 2022-03-08
Payer: COMMERCIAL

## 2022-03-08 ENCOUNTER — LAB VISIT (OUTPATIENT)
Dept: LAB | Facility: HOSPITAL | Age: 62
End: 2022-03-08
Attending: ANESTHESIOLOGY
Payer: COMMERCIAL

## 2022-03-08 ENCOUNTER — TELEPHONE (OUTPATIENT)
Dept: PAIN MEDICINE | Facility: CLINIC | Age: 62
End: 2022-03-08

## 2022-03-08 VITALS
WEIGHT: 275.56 LBS | DIASTOLIC BLOOD PRESSURE: 79 MMHG | HEIGHT: 68 IN | SYSTOLIC BLOOD PRESSURE: 164 MMHG | HEART RATE: 65 BPM | BODY MASS INDEX: 41.76 KG/M2

## 2022-03-08 DIAGNOSIS — Z01.818 PRE-OP TESTING: ICD-10-CM

## 2022-03-08 DIAGNOSIS — M54.12 CERVICAL RADICULOPATHY: Primary | ICD-10-CM

## 2022-03-08 DIAGNOSIS — M54.12 CERVICAL RADICULOPATHY: ICD-10-CM

## 2022-03-08 PROCEDURE — 3078F PR MOST RECENT DIASTOLIC BLOOD PRESSURE < 80 MM HG: ICD-10-PCS | Mod: CPTII,S$GLB,, | Performed by: ANESTHESIOLOGY

## 2022-03-08 PROCEDURE — 4010F ACE/ARB THERAPY RXD/TAKEN: CPT | Mod: CPTII,S$GLB,, | Performed by: ANESTHESIOLOGY

## 2022-03-08 PROCEDURE — 3077F SYST BP >= 140 MM HG: CPT | Mod: CPTII,S$GLB,, | Performed by: ANESTHESIOLOGY

## 2022-03-08 PROCEDURE — 1160F RVW MEDS BY RX/DR IN RCRD: CPT | Mod: CPTII,S$GLB,, | Performed by: ANESTHESIOLOGY

## 2022-03-08 PROCEDURE — 3008F BODY MASS INDEX DOCD: CPT | Mod: CPTII,S$GLB,, | Performed by: ANESTHESIOLOGY

## 2022-03-08 PROCEDURE — 99999 PR PBB SHADOW E&M-EST. PATIENT-LVL V: ICD-10-PCS | Mod: PBBFAC,,, | Performed by: ANESTHESIOLOGY

## 2022-03-08 PROCEDURE — 4010F PR ACE/ARB THEARPY RXD/TAKEN: ICD-10-PCS | Mod: CPTII,S$GLB,, | Performed by: ANESTHESIOLOGY

## 2022-03-08 PROCEDURE — 99204 OFFICE O/P NEW MOD 45 MIN: CPT | Mod: CS,S$GLB,, | Performed by: ANESTHESIOLOGY

## 2022-03-08 PROCEDURE — 3044F PR MOST RECENT HEMOGLOBIN A1C LEVEL <7.0%: ICD-10-PCS | Mod: CPTII,S$GLB,, | Performed by: ANESTHESIOLOGY

## 2022-03-08 PROCEDURE — 3044F HG A1C LEVEL LT 7.0%: CPT | Mod: CPTII,S$GLB,, | Performed by: ANESTHESIOLOGY

## 2022-03-08 PROCEDURE — 3078F DIAST BP <80 MM HG: CPT | Mod: CPTII,S$GLB,, | Performed by: ANESTHESIOLOGY

## 2022-03-08 PROCEDURE — 1159F MED LIST DOCD IN RCRD: CPT | Mod: CPTII,S$GLB,, | Performed by: ANESTHESIOLOGY

## 2022-03-08 PROCEDURE — 99999 PR PBB SHADOW E&M-EST. PATIENT-LVL V: CPT | Mod: PBBFAC,,, | Performed by: ANESTHESIOLOGY

## 2022-03-08 PROCEDURE — 1160F PR REVIEW ALL MEDS BY PRESCRIBER/CLIN PHARMACIST DOCUMENTED: ICD-10-PCS | Mod: CPTII,S$GLB,, | Performed by: ANESTHESIOLOGY

## 2022-03-08 PROCEDURE — 1159F PR MEDICATION LIST DOCUMENTED IN MEDICAL RECORD: ICD-10-PCS | Mod: CPTII,S$GLB,, | Performed by: ANESTHESIOLOGY

## 2022-03-08 PROCEDURE — 99204 PR OFFICE/OUTPT VISIT, NEW, LEVL IV, 45-59 MIN: ICD-10-PCS | Mod: CS,S$GLB,, | Performed by: ANESTHESIOLOGY

## 2022-03-08 PROCEDURE — 3008F PR BODY MASS INDEX (BMI) DOCUMENTED: ICD-10-PCS | Mod: CPTII,S$GLB,, | Performed by: ANESTHESIOLOGY

## 2022-03-08 PROCEDURE — 85025 COMPLETE CBC W/AUTO DIFF WBC: CPT | Performed by: ANESTHESIOLOGY

## 2022-03-08 PROCEDURE — 36415 COLL VENOUS BLD VENIPUNCTURE: CPT | Mod: PO | Performed by: ANESTHESIOLOGY

## 2022-03-08 PROCEDURE — 3077F PR MOST RECENT SYSTOLIC BLOOD PRESSURE >= 140 MM HG: ICD-10-PCS | Mod: CPTII,S$GLB,, | Performed by: ANESTHESIOLOGY

## 2022-03-08 RX ORDER — SODIUM CHLORIDE, SODIUM LACTATE, POTASSIUM CHLORIDE, CALCIUM CHLORIDE 600; 310; 30; 20 MG/100ML; MG/100ML; MG/100ML; MG/100ML
INJECTION, SOLUTION INTRAVENOUS CONTINUOUS
Status: CANCELLED | OUTPATIENT
Start: 2022-03-08

## 2022-03-08 NOTE — TELEPHONE ENCOUNTER
He was just seen by Dr. Santos this AM and he advised that he spoke directly to Dr. Mercado and wants the pt to be seen prior to his injection.

## 2022-03-08 NOTE — TELEPHONE ENCOUNTER
Dr. Santos spoke with Dr. Mercado regarding this pt. He needs to be seen prior to DANNY that is scheduled on 3/16. Can someone assist with scheduling?

## 2022-03-08 NOTE — PROGRESS NOTES
[facility] New Patient Evaluation    Referred by: Donna Angeles NP  Reason for referral: neck pain    CC:   Chief Complaint   Patient presents with    Shoulder Pain    Neck Pain    Hand Pain    Arm Pain      Last 3 PDI Scores 3/8/2022   Pain Disability Index (PDI) 46       HPI:   Oc Solis is a 61 y.o. male who complains of neck pain.  He has been having worsening neck pain for the last month.  Today his pain is 8/10, constant, burning, numb, tingling with radiation down both arms to both hands.  He reports numbness and weakness.  He denies any bowel bladder control problems.  His pain is worse with laying, coughing sneezing, lifting, nighttime.    History:    Current Outpatient Medications:     acetaminophen (TYLENOL) 500 MG tablet, Take 1,000 mg by mouth every 6 (six) hours as needed for Pain., Disp: , Rfl:     albuterol (PROVENTIL/VENTOLIN HFA) 90 mcg/actuation inhaler, Inhale 2 puffs into the lungs every 4 (four) hours as needed for Wheezing or Shortness of Breath. Rescue, Disp: 18 g, Rfl: 1    aspirin (ECOTRIN) 81 MG EC tablet, Take 1 tablet (81 mg total) by mouth once daily., Disp: , Rfl: 0    blood sugar diagnostic Strp, To check BG onc times daily, to use with insurance preferred meter, Disp: 50 each, Rfl: 9    blood-glucose meter kit, Use to check glucose daily, Disp: 1 each, Rfl: 0    diazePAM (VALIUM) 5 MG tablet, Take 1 tablet (5 mg total) by mouth nightly as needed for Anxiety., Disp: 30 tablet, Rfl: 0    gabapentin (NEURONTIN) 300 MG capsule, Take 1 capsule (300 mg total) by mouth 3 (three) times daily., Disp: 90 capsule, Rfl: 1    HYDROcodone-acetaminophen (NORCO)  mg per tablet, Take 1 tablet by mouth every 12 (twelve) hours as needed for Pain., Disp: 20 tablet, Rfl: 0    lancets Misc, To check BG one times daily, to use with insurance preferred meter, Disp: 50 each, Rfl: 9    LIDOcaine (LIDODERM) 5 %, Place 1 patch onto the skin once daily. Remove & Discard patch  within 12 hours or as directed by MD for 5 days, Disp: 5 patch, Rfl: 0    losartan (COZAAR) 50 MG tablet, Take 1 tablet (50 mg total) by mouth once daily. (Patient taking differently: Take 50 mg by mouth every evening.), Disp: 90 tablet, Rfl: 3    metFORMIN (GLUCOPHAGE) 500 MG tablet, Take 1 tablet (500 mg total) by mouth 2 (two) times daily with meals., Disp: 180 tablet, Rfl: 3    metoprolol tartrate (LOPRESSOR) 25 MG tablet, Take 1 tablet (25 mg total) by mouth 3 (three) times daily., Disp: 90 tablet, Rfl: 1    omeprazole (PRILOSEC) 20 MG capsule, Take 1 capsule (20 mg total) by mouth daily as needed (heartburn)., Disp: 90 capsule, Rfl: 1    rosuvastatin (CRESTOR) 5 MG tablet, Take 1 tablet (5 mg total) by mouth once daily., Disp: 90 tablet, Rfl: 3    tiZANidine (ZANAFLEX) 4 MG tablet, Take 4 mg by mouth every evening., Disp: , Rfl:     Past Medical History:   Diagnosis Date    Anxiety     Asthma     childhood    Candidiasis of skin and nails     Hyperglycemia     Hyperlipidemia     Hypertension     Laceration     Neck pain     Rectal/anal hemorrhage     Unspecified adverse effect of unspecified drug, medicinal and biological substance        Past Surgical History:   Procedure Laterality Date    BACK SURGERY      COLONOSCOPY N/A 1/17/2017    Procedure: COLONOSCOPY;  Surgeon: Carmine Harrell Jr., MD;  Location: [facility];  Service: Endoscopy;  Laterality: N/A;    KYPHOSIS SURGERY  03/19/2013    neck fusion  07/2000       Family History   Problem Relation Age of Onset    Thyroid disease Mother     Cancer Mother         lung    Hyperlipidemia Father     Heart disease Father        Social History     Socioeconomic History    Marital status:    Tobacco Use    Smoking status: Never Smoker    Smokeless tobacco: Never Used   Substance and Sexual Activity    Alcohol use: Yes     Comment: 1 bottle of wine per week now pt reports    Drug use: No    Sexual activity: Yes     Partners:  "Female       Review of patient's allergies indicates:   Allergen Reactions    Percocet [oxycodone-acetaminophen] Itching    Penicillins Rash       Review of Systems:  General ROS: negative for - fever  Psychological ROS: negative for - hostility  Hematological and Lymphatic ROS: negative for - bleeding problems  Endocrine ROS: negative for - unexpected weight changes  Respiratory ROS: no cough, shortness of breath, or wheezing  Cardiovascular ROS: no chest pain or dyspnea on exertion  Gastrointestinal ROS: no abdominal pain, change in bowel habits, or black or bloody stools  Musculoskeletal ROS: positive for - muscular weakness  Neurological ROS: positive for - numbness/tingling  negative for - bowel and bladder control changes  Dermatological ROS: negative for rash    Physical Exam:  Vitals:    03/08/22 0833   BP: (!) 164/79   Pulse: 65   Weight: 125 kg (275 lb 9.2 oz)   Height: 5' 8" (1.727 m)   PainSc:   8   PainLoc: Neck     Body mass index is 41.9 kg/m².    Gen: NAD  Psych: mood appropriate for given condition  CV: 2+ radial pulse  HEENT: anicteric   Respiratory: non labored  Abd: soft nt, nd  Skin: intact  Sensation: intact to lt touch bilaterally in c4-t1   Reflexes: 2+ b/l Bicep, 0 left tricep, 2+ left tricep, and 2+ b/l patella Sandra negative  ROM: Cervical ROM full, shoulder, elbow and wrist ROM full  Tone:  Normal at elbow, wrist and shoulder   Inspection: no atrophy of bicep, FDI or APB noted  Special tests:  Palpation: tender cervical paraspinals, levator scapula and trapezius    Motor:    Right Left   C4 Shoulder Abduction  5  5   C5 Elbow Flexion    5-  5   C6 Wrist Extension  4-  5   C7 Elbow Extension   4-  5   C8/T1 Hand Intrinsics   2  5                 Imaging:  MRI cervical spine 2/23/22  FINDINGS:  Vertebral column: There is solid bony fusion of C6 and C7.  This was present on the prior study.  Vertebral body height is preserved.  There is no fracture.  There is marked disc space narrowing " at the C3-4, C4-5, C5-6 levels with mild-to-moderate disc space narrowing at the C7-T1 level.  Disc space narrowing at the C3-4 level has progressed compared to the prior plain films.  There is 2 mm anterolisthesis of C3 on C4 and C7 on T1 with trace retrolisthesis of C5 on C6.  The discs are desiccated.  Baseline marrow signal is normal.  The odontoid process is intact     Spinal canal, cord, epidural space: The spinal canal is developmentally normal.  Multilevel degenerative change does result in spinal stenosis with flattening of the cord surface at several levels.  Cord signal is grossly normal but patient motion does limit evaluation.     Findings by level:     C2-3: There is right greater than left facet joint arthropathy with mild uncovertebral spurring and minimal disc bulge.  There is no spinal stenosis.  There is moderate right and mild left foraminal stenosis.  C3-4: There is marked disc space narrowing, trace anterolisthesis of C3 on C4.  There is bilateral facet joint arthropathy and uncovertebral spurring with a broad disc osteophyte complex which narrows the subarachnoid space.  There is mild-to-moderate spinal stenosis with severe bilateral foraminal stenosis.  C4-5: There is marked disc space narrowing, bilateral uncovertebral spurring.  There is bilateral facet joint arthropathy.  There is a broad disc protrusion/osteophyte eccentric to the right.  There is narrowing of the ventral and dorsal subarachnoid space.  There is subtle flattening of the right ventral cord surface.  There is moderate spinal stenosis with severe bilateral foraminal stenosis.  C5-6: There is marked disc space narrowing.  There is trace retrolisthesis of C5 on C6 which is exaggerated by osteophyte formation.  There is marked bilateral, left greater than right, uncovertebral spurring and facet joint arthropathy.  There is a moderate broad disc osteophyte complex.  There is moderate spinal stenosis with severe bilateral, left  greater than right, foraminal stenosis.  C6-7: There is solid bony fusion.  There is bilateral uncovertebral spurring with mild facet joint arthropathy.  There is mild spinal stenosis with marked bilateral foraminal stenosis.  C7-T1: There is disc space narrowing.  There is mild anterolisthesis of C7 on T1.  There is bilateral facet joint arthropathy with ligamentum flavum thickening and there is unroofing of a diffuse disc bulge.  There is moderate to severe spinal stenosis with severe bilateral foraminal stenosis.  There is flattening of the ventral cord surface.     Soft tissues, other: The prevertebral soft tissues are grossly normal.  The airway is patent.    U/S RUE 3/4/22  Impression:  No thrombus in central veins of the right upper extremity.    Labs:  BMP  Lab Results   Component Value Date     02/01/2022    K 3.9 02/01/2022     02/01/2022    CO2 26 02/01/2022    BUN 10 02/01/2022    CREATININE 0.75 02/01/2022    CALCIUM 9.7 02/01/2022    ANIONGAP 10 02/01/2022    ESTGFRAFRICA >60 02/01/2022    EGFRNONAA >60 02/01/2022     Lab Results   Component Value Date    ALT 24 02/01/2022    AST 45 02/01/2022    ALKPHOS 94 02/01/2022    BILITOT 0.5 02/01/2022       Assessment:   Problem List Items Addressed This Visit    None     Visit Diagnoses     Cervical radiculopathy    -  Primary    Relevant Orders    CBC W/ AUTO DIFFERENTIAL    Pre-op testing        Relevant Orders    CBC W/ AUTO DIFFERENTIAL          61 y.o. year old male with PMH hypertension who complains of neck pain.  He has been having worsening neck pain for the last month.  Today his pain is 8/10, constant, burning, numb, tingling with radiation down both arms to both hands.  He reports numbness and weakness.  He denies any bowel bladder control problems.  His pain is worse with laying, coughing sneezing, lifting, nighttime.  History of C6-7 bony fusion in 2000.    - on exam he has 2/5 right  strength, 4-/5 right elbow extension, 4-/5  right wrist extension.  He has decreased sensation in a nondermatomal distribution down his right arm.  Evi's negative. absent right triceps DTR, 2+ b/l patellar dtr.   - he reports that changes of numbness and weakness in his arm have been progressing over the past 3-4 weeks  - he does have some mild swelling of the right upper extremity.  He went to the ER for this and ultrasound was negative for any thrombus in the central veins of the right arm  - I independently reviewed his cervical MRI and he has history C6-7 bony fusion and C7-T1 severe central canal narrowing  - his pain is too severe to do any physical therapy at this time  - we discussed cervical DANNY but I am not positive that it is the right choice in management for long-term solution for his pain and symptoms.  - I am going to get him a follow-up with one of our neurosurgeons for further evaluation.  We will consider DANNY after that visit.    : Not applicable    Pipo Santos M.D.  Interventional Pain Medicine / Anesthesiology    This note was completed with dictation software and grammatical errors may exist.

## 2022-03-09 LAB
BASOPHILS # BLD AUTO: 0.02 K/UL (ref 0–0.2)
BASOPHILS NFR BLD: 0.4 % (ref 0–1.9)
DIFFERENTIAL METHOD: ABNORMAL
EOSINOPHIL # BLD AUTO: 0.1 K/UL (ref 0–0.5)
EOSINOPHIL NFR BLD: 2.7 % (ref 0–8)
ERYTHROCYTE [DISTWIDTH] IN BLOOD BY AUTOMATED COUNT: 13.3 % (ref 11.5–14.5)
HCT VFR BLD AUTO: 40.1 % (ref 40–54)
HGB BLD-MCNC: 12.9 G/DL (ref 14–18)
IMM GRANULOCYTES # BLD AUTO: 0.01 K/UL (ref 0–0.04)
IMM GRANULOCYTES NFR BLD AUTO: 0.2 % (ref 0–0.5)
LYMPHOCYTES # BLD AUTO: 1.3 K/UL (ref 1–4.8)
LYMPHOCYTES NFR BLD: 28.9 % (ref 18–48)
MCH RBC QN AUTO: 29.3 PG (ref 27–31)
MCHC RBC AUTO-ENTMCNC: 32.2 G/DL (ref 32–36)
MCV RBC AUTO: 91 FL (ref 82–98)
MONOCYTES # BLD AUTO: 0.7 K/UL (ref 0.3–1)
MONOCYTES NFR BLD: 14.4 % (ref 4–15)
NEUTROPHILS # BLD AUTO: 2.4 K/UL (ref 1.8–7.7)
NEUTROPHILS NFR BLD: 53.4 % (ref 38–73)
NRBC BLD-RTO: 0 /100 WBC
PLATELET # BLD AUTO: 140 K/UL (ref 150–450)
PMV BLD AUTO: 12.4 FL (ref 9.2–12.9)
RBC # BLD AUTO: 4.4 M/UL (ref 4.6–6.2)
WBC # BLD AUTO: 4.5 K/UL (ref 3.9–12.7)

## 2022-03-15 ENCOUNTER — OFFICE VISIT (OUTPATIENT)
Dept: NEUROSURGERY | Facility: CLINIC | Age: 62
End: 2022-03-15
Payer: COMMERCIAL

## 2022-03-15 VITALS
BODY MASS INDEX: 41.95 KG/M2 | DIASTOLIC BLOOD PRESSURE: 66 MMHG | WEIGHT: 276.81 LBS | HEART RATE: 57 BPM | HEIGHT: 68 IN | RESPIRATION RATE: 18 BRPM | SYSTOLIC BLOOD PRESSURE: 111 MMHG

## 2022-03-15 DIAGNOSIS — M54.12 CERVICAL RADICULOPATHY: Primary | ICD-10-CM

## 2022-03-15 DIAGNOSIS — M47.812 CERVICAL SPONDYLOSIS: ICD-10-CM

## 2022-03-15 PROCEDURE — 3044F HG A1C LEVEL LT 7.0%: CPT | Mod: CPTII,S$GLB,, | Performed by: NEUROLOGICAL SURGERY

## 2022-03-15 PROCEDURE — 3074F SYST BP LT 130 MM HG: CPT | Mod: CPTII,S$GLB,, | Performed by: NEUROLOGICAL SURGERY

## 2022-03-15 PROCEDURE — 3044F PR MOST RECENT HEMOGLOBIN A1C LEVEL <7.0%: ICD-10-PCS | Mod: CPTII,S$GLB,, | Performed by: NEUROLOGICAL SURGERY

## 2022-03-15 PROCEDURE — 99214 PR OFFICE/OUTPT VISIT, EST, LEVL IV, 30-39 MIN: ICD-10-PCS | Mod: S$GLB,,, | Performed by: NEUROLOGICAL SURGERY

## 2022-03-15 PROCEDURE — 3008F BODY MASS INDEX DOCD: CPT | Mod: CPTII,S$GLB,, | Performed by: NEUROLOGICAL SURGERY

## 2022-03-15 PROCEDURE — 4010F ACE/ARB THERAPY RXD/TAKEN: CPT | Mod: CPTII,S$GLB,, | Performed by: NEUROLOGICAL SURGERY

## 2022-03-15 PROCEDURE — 4010F PR ACE/ARB THEARPY RXD/TAKEN: ICD-10-PCS | Mod: CPTII,S$GLB,, | Performed by: NEUROLOGICAL SURGERY

## 2022-03-15 PROCEDURE — 1159F PR MEDICATION LIST DOCUMENTED IN MEDICAL RECORD: ICD-10-PCS | Mod: CPTII,S$GLB,, | Performed by: NEUROLOGICAL SURGERY

## 2022-03-15 PROCEDURE — 3078F PR MOST RECENT DIASTOLIC BLOOD PRESSURE < 80 MM HG: ICD-10-PCS | Mod: CPTII,S$GLB,, | Performed by: NEUROLOGICAL SURGERY

## 2022-03-15 PROCEDURE — 99214 OFFICE O/P EST MOD 30 MIN: CPT | Mod: S$GLB,,, | Performed by: NEUROLOGICAL SURGERY

## 2022-03-15 PROCEDURE — 3074F PR MOST RECENT SYSTOLIC BLOOD PRESSURE < 130 MM HG: ICD-10-PCS | Mod: CPTII,S$GLB,, | Performed by: NEUROLOGICAL SURGERY

## 2022-03-15 PROCEDURE — 1159F MED LIST DOCD IN RCRD: CPT | Mod: CPTII,S$GLB,, | Performed by: NEUROLOGICAL SURGERY

## 2022-03-15 PROCEDURE — 3008F PR BODY MASS INDEX (BMI) DOCUMENTED: ICD-10-PCS | Mod: CPTII,S$GLB,, | Performed by: NEUROLOGICAL SURGERY

## 2022-03-15 PROCEDURE — 3078F DIAST BP <80 MM HG: CPT | Mod: CPTII,S$GLB,, | Performed by: NEUROLOGICAL SURGERY

## 2022-03-15 NOTE — H&P
Neurosurgery History & Physical    Patient ID: Oc Solis is a 61 y.o. male.    Chief Complaint   Patient presents with    Follow-up     Follow up for Danielle; numbness  and tingling in right arm, hand and fingers-unable to make a fist. Pain above the right shoulder blade. Hurts to cough.       HPI:  Mr. Solis is a 61 year old male with HLD, HTN, anxiety, asthma, hyperglycemia who presents today for evaluation of neck and right arm pain. He had C6-7 cervical spine fusion in 2000 with Dr. Solorzano and lumbar spine surgery (unsure of levels) in 2014 with Dr. Verde. The pain in 2000 was down the left arm from the elbow to the medial three digits.  It was not as bad as it is currently.  Following surgery his symptoms completely resolved until the last 8 weeks. He denies any recent trauma or injury that precipitated the onset of the pain. More recently he also began right arm pain.  He has neck pain with tingling in his arms from the elbow into his 3-5th digits in R>L arms.  He really feels like it is almost the whole hand on the right side. He also has decreased  strength. He feels this is worse in the right hand as well. His arm symptoms are more bothersome than his neck pain as this affects his ability to perform his daily activities. He does not feel like he is having too much trouble walking.     He feels like he no longer has much useable function in his right hand.     He is taking Norco, zanaflex and ibuprofen for pain. He was evaluated by Denise Rec[facility] who recommended neurosurgical evaluation prior to proceeding with injections. He has not recently participated in PT but has in the past.     Review of Systems  Constitutional: Negative for activity change, chills, fatigue and unexpected weight change.   HENT: Negative for hearing loss, tinnitus, trouble swallowing and voice change.    Eyes: Negative for visual disturbance.   Respiratory: Negative for apnea, chest tightness and  shortness of breath.    Cardiovascular: Negative for chest pain and palpitations.   Gastrointestinal: Negative for abdominal pain, constipation, diarrhea, nausea and vomiting.   Genitourinary: Negative for difficulty urinating, dysuria and frequency.   Musculoskeletal: Positive for myalgias and neck pain. Negative for back pain, gait problem and neck stiffness.   Skin: Negative for wound.   Neurological: Positive for numbness and weakness. Negative for dizziness, tremors, seizures, facial asymmetry, speech difficulty, weakness, light-headedness and headaches.   Psychiatric/Behavioral: Negative for confusion and decreased concentration.     Past Medical History:   Diagnosis Date    Anxiety     Asthma     childhood    Candidiasis of skin and nails     Hyperglycemia     Hyperlipidemia     Hypertension     Laceration     Neck pain     Rectal/anal hemorrhage     Unspecified adverse effect of unspecified drug, medicinal and biological substance      Social History     Socioeconomic History    Marital status:    Tobacco Use    Smoking status: Never Smoker    Smokeless tobacco: Never Used   Substance and Sexual Activity    Alcohol use: Yes     Comment: 1 bottle of wine per week now pt reports    Drug use: No    Sexual activity: Yes     Partners: Female     Family History   Problem Relation Age of Onset    Thyroid disease Mother     Cancer Mother         lung    Hyperlipidemia Father     Heart disease Father      Review of patient's allergies indicates:   Allergen Reactions    Percocet [oxycodone-acetaminophen] Itching    Penicillins Rash       Current Outpatient Medications:     acetaminophen (TYLENOL) 500 MG tablet, Take 1,000 mg by mouth every 6 (six) hours as needed for Pain., Disp: , Rfl:     albuterol (PROVENTIL/VENTOLIN HFA) 90 mcg/actuation inhaler, Inhale 2 puffs into the lungs every 4 (four) hours as needed for Wheezing or Shortness of Breath. Rescue, Disp: 18 g, Rfl: 1    aspirin  "(ECOTRIN) 81 MG EC tablet, Take 1 tablet (81 mg total) by mouth once daily., Disp: , Rfl: 0    blood sugar diagnostic Strp, To check BG onc times daily, to use with insurance preferred meter, Disp: 50 each, Rfl: 9    blood-glucose meter kit, Use to check glucose daily, Disp: 1 each, Rfl: 0    diazePAM (VALIUM) 5 MG tablet, Take 1 tablet (5 mg total) by mouth nightly as needed for Anxiety., Disp: 30 tablet, Rfl: 0    gabapentin (NEURONTIN) 300 MG capsule, Take 1 capsule (300 mg total) by mouth 3 (three) times daily., Disp: 90 capsule, Rfl: 1    HYDROcodone-acetaminophen (NORCO)  mg per tablet, Take 1 tablet by mouth every 12 (twelve) hours as needed for Pain., Disp: 20 tablet, Rfl: 0    lancets Misc, To check BG one times daily, to use with insurance preferred meter, Disp: 50 each, Rfl: 9    losartan (COZAAR) 50 MG tablet, Take 1 tablet (50 mg total) by mouth once daily. (Patient taking differently: Take 50 mg by mouth every evening.), Disp: 90 tablet, Rfl: 3    metFORMIN (GLUCOPHAGE) 500 MG tablet, Take 1 tablet (500 mg total) by mouth 2 (two) times daily with meals., Disp: 180 tablet, Rfl: 3    metoprolol tartrate (LOPRESSOR) 25 MG tablet, Take 1 tablet (25 mg total) by mouth 3 (three) times daily., Disp: 90 tablet, Rfl: 1    omeprazole (PRILOSEC) 20 MG capsule, Take 1 capsule (20 mg total) by mouth daily as needed (heartburn)., Disp: 90 capsule, Rfl: 1    rosuvastatin (CRESTOR) 5 MG tablet, Take 1 tablet (5 mg total) by mouth once daily., Disp: 90 tablet, Rfl: 3    tiZANidine (ZANAFLEX) 4 MG tablet, Take 4 mg by mouth every evening., Disp: , Rfl:   Blood pressure 111/66, pulse (!) 57, resp. rate 18, height 5' 8" (1.727 m), weight 125.5 kg (276 lb 12.6 oz).      Neurologic Exam  Mental Status   Oriented to person, place, and time.   Level of consciousness: alert     Cranial Nerves      CN III, IV, VI   Extraocular motions are normal.      Motor Exam   Muscle bulk: normal  Overall muscle tone: " normal     Strength   Strength 5/5 except as noted.   4/5 right , triceps  4-/5 right wrist extension, wrist flexion      Sensory Exam   Right arm light touch: decreased from elbow     Gait, Coordination, and Reflexes      Gait  Gait: normal     Reflexes   Right Sandra: absent  Left Sandra: absent  Right ankle clonus: absent  Left ankle clonus: absent        Physical Exam  Constitutional:       Appearance: He is well-developed.   HENT:      Head: Normocephalic and atraumatic.   Eyes:      Extraocular Movements: EOM normal.      Conjunctiva/sclera: Conjunctivae normal.   Abdominal:      Tenderness: There is no guarding.   Musculoskeletal:         General: Normal range of motion.      Cervical back: Normal range of motion.   Skin:     General: Skin is warm and dry.   Neurological:      Mental Status: He is alert and oriented to person, place, and time.      Gait: Gait is intact.         Physical Exam  Constitutional:       Appearance: He is well-developed.   HENT:      Head: Normocephalic and atraumatic.   Eyes:      Extraocular Movements: EOM normal.      Conjunctiva/sclera: Conjunctivae normal.   Abdominal:      Tenderness: There is no guarding.   Musculoskeletal:         General: Normal range of motion.      Cervical back: Normal range of motion.   Skin:     General: Skin is warm and dry.   Neurological:      Mental Status: He is alert and oriented to person, place, and time.      Gait: Gait is intact.     Imaging:  Mri cervical spine dated 2/23/2022 is personally reviewed and discussed with the patient.  There is stenosis at the C7-T1 level due to facet hypertrophy and Grade I spondylolisthesis.  There is kyphotic curvature of the cervical spine superior to the prior fusion with mild stenosis at C4-5 and C5-6.      Assessment/Plan:   61 year old male with history of prior C6-7 fusion in 2000 and repeat surgery in 2014.  Pain was for the left arm to the medial three digits at that time.  His pain has been  well controlled since 2014 until the last 8 weeks or so.  He now has pain into the bilateral arms R > L into the 3-5th digits.  His arm hurts worse than his neck.  He is not having trouble walking.  He is not having bowel/bladder dysfunction.  He does report trouble using his right hand.  His symptoms appear to be more radicular in nature but myelopathy can't be completely ruled out.    Imaging is discussed above but shows adjacent level disease most severe at C7-T1 with kyphotic curvature superior to the prior fusion.    Plan is to obtain CT to evaluate feasibility of anterior approach, however I feel it is unlikley we will be able to access C7-T1 adequately from the front.  Posteriorly he would likely need long segment decompression and fixation due to his kyphotic deformity above the prior fusion.      Plan is to obtain CT and then discuss details of surgical approach with colleagues prior to getting back to the patient with definitive surgical plan.

## 2022-03-21 ENCOUNTER — HOSPITAL ENCOUNTER (OUTPATIENT)
Dept: RADIOLOGY | Facility: HOSPITAL | Age: 62
Discharge: HOME | End: 2022-03-21
Attending: NURSE PRACTITIONER
Payer: COMMERCIAL

## 2022-03-21 DIAGNOSIS — M54.12 CERVICAL RADICULOPATHY: ICD-10-CM

## 2022-03-21 DIAGNOSIS — M47.812 CERVICAL SPONDYLOSIS: ICD-10-CM

## 2022-03-21 PROCEDURE — 72125 CT NECK SPINE W/O DYE: CPT | Mod: 26,,, | Performed by: RADIOLOGY

## 2022-03-21 PROCEDURE — 72125 CT NECK SPINE W/O DYE: CPT | Mod: TC,PO

## 2022-03-21 PROCEDURE — 72125 CT CERVICAL SPINE WITHOUT CONTRAST: ICD-10-PCS | Mod: 26,,, | Performed by: RADIOLOGY

## 2022-03-22 ENCOUNTER — TELEPHONE (OUTPATIENT)
Dept: NEUROSURGERY | Facility: CLINIC | Age: 62
End: 2022-03-22
Payer: COMMERCIAL

## 2022-03-22 NOTE — TELEPHONE ENCOUNTER
Definitive surgery plan still under review. Dr. Mercado is consulting with colleagues. Will follow upwith patient after final surgery plan decided. Should only be 1-2 days.

## 2022-03-28 ENCOUNTER — TELEPHONE (OUTPATIENT)
Dept: NEUROSURGERY | Facility: CLINIC | Age: 62
End: 2022-03-28
Payer: COMMERCIAL

## 2022-03-31 ENCOUNTER — TELEPHONE (OUTPATIENT)
Dept: NEUROSURGERY | Facility: CLINIC | Age: 62
End: 2022-03-31
Payer: COMMERCIAL

## 2022-03-31 DIAGNOSIS — M54.12 CERVICAL RADICULOPATHY: Primary | ICD-10-CM

## 2022-03-31 RX ORDER — SODIUM CHLORIDE, SODIUM LACTATE, POTASSIUM CHLORIDE, CALCIUM CHLORIDE 600; 310; 30; 20 MG/100ML; MG/100ML; MG/100ML; MG/100ML
INJECTION, SOLUTION INTRAVENOUS CONTINUOUS
Status: CANCELLED | OUTPATIENT
Start: 2022-03-31

## 2022-03-31 RX ORDER — LIDOCAINE HYDROCHLORIDE 10 MG/ML
1 INJECTION, SOLUTION EPIDURAL; INFILTRATION; INTRACAUDAL; PERINEURAL ONCE
Status: CANCELLED | OUTPATIENT
Start: 2022-03-31 | End: 2022-03-31

## 2022-03-31 NOTE — TELEPHONE ENCOUNTER
Mr. Solis will be having surgery on 4/21 with Dr. Mercado, Neurosurgeon, at [facility]. We are reaching out to obtain a surgical clearance from Dr. Iverson to ensure the safety of our patient. The surgery is C3-T1 posterior instrumented fusion and will be under general anesthesia. This procedure typically lasts approximately 4 hours. We request that the patient hold all anticoagulant/antiinflammatory medications for 5-7 days prior to surgery. The patient is scheduled for his pre-op on 4/4. Please let us know if our office can be of further assistance.      Thank you,      Cheryle Banda LPN  P: [phone]  F: [phone]

## 2022-04-05 DIAGNOSIS — M54.12 CERVICAL RADICULOPATHY: Primary | ICD-10-CM

## 2022-04-06 ENCOUNTER — OFFICE VISIT (OUTPATIENT)
Dept: NEUROSURGERY | Facility: CLINIC | Age: 62
End: 2022-04-06
Payer: COMMERCIAL

## 2022-04-06 VITALS
SYSTOLIC BLOOD PRESSURE: 132 MMHG | DIASTOLIC BLOOD PRESSURE: 71 MMHG | WEIGHT: 268.31 LBS | RESPIRATION RATE: 18 BRPM | HEIGHT: 68 IN | HEART RATE: 48 BPM | BODY MASS INDEX: 40.66 KG/M2

## 2022-04-06 DIAGNOSIS — M54.12 CERVICAL RADICULOPATHY: ICD-10-CM

## 2022-04-06 DIAGNOSIS — M47.812 CERVICAL SPONDYLOSIS: Primary | ICD-10-CM

## 2022-04-06 PROCEDURE — 3061F NEG MICROALBUMINURIA REV: CPT | Mod: CPTII,S$GLB,, | Performed by: NEUROLOGICAL SURGERY

## 2022-04-06 PROCEDURE — 1159F MED LIST DOCD IN RCRD: CPT | Mod: CPTII,S$GLB,, | Performed by: NEUROLOGICAL SURGERY

## 2022-04-06 PROCEDURE — 3008F BODY MASS INDEX DOCD: CPT | Mod: CPTII,S$GLB,, | Performed by: NEUROLOGICAL SURGERY

## 2022-04-06 PROCEDURE — 3075F SYST BP GE 130 - 139MM HG: CPT | Mod: CPTII,S$GLB,, | Performed by: NEUROLOGICAL SURGERY

## 2022-04-06 PROCEDURE — 3075F PR MOST RECENT SYSTOLIC BLOOD PRESS GE 130-139MM HG: ICD-10-PCS | Mod: CPTII,S$GLB,, | Performed by: NEUROLOGICAL SURGERY

## 2022-04-06 PROCEDURE — 3066F PR DOCUMENTATION OF TREATMENT FOR NEPHROPATHY: ICD-10-PCS | Mod: CPTII,S$GLB,, | Performed by: NEUROLOGICAL SURGERY

## 2022-04-06 PROCEDURE — 3044F HG A1C LEVEL LT 7.0%: CPT | Mod: CPTII,S$GLB,, | Performed by: NEUROLOGICAL SURGERY

## 2022-04-06 PROCEDURE — 1159F PR MEDICATION LIST DOCUMENTED IN MEDICAL RECORD: ICD-10-PCS | Mod: CPTII,S$GLB,, | Performed by: NEUROLOGICAL SURGERY

## 2022-04-06 PROCEDURE — 3061F PR NEG MICROALBUMINURIA RESULT DOCUMENTED/REVIEW: ICD-10-PCS | Mod: CPTII,S$GLB,, | Performed by: NEUROLOGICAL SURGERY

## 2022-04-06 PROCEDURE — 3066F NEPHROPATHY DOC TX: CPT | Mod: CPTII,S$GLB,, | Performed by: NEUROLOGICAL SURGERY

## 2022-04-06 PROCEDURE — 4010F PR ACE/ARB THEARPY RXD/TAKEN: ICD-10-PCS | Mod: CPTII,S$GLB,, | Performed by: NEUROLOGICAL SURGERY

## 2022-04-06 PROCEDURE — 3008F PR BODY MASS INDEX (BMI) DOCUMENTED: ICD-10-PCS | Mod: CPTII,S$GLB,, | Performed by: NEUROLOGICAL SURGERY

## 2022-04-06 PROCEDURE — 3044F PR MOST RECENT HEMOGLOBIN A1C LEVEL <7.0%: ICD-10-PCS | Mod: CPTII,S$GLB,, | Performed by: NEUROLOGICAL SURGERY

## 2022-04-06 PROCEDURE — 3078F DIAST BP <80 MM HG: CPT | Mod: CPTII,S$GLB,, | Performed by: NEUROLOGICAL SURGERY

## 2022-04-06 PROCEDURE — 99215 OFFICE O/P EST HI 40 MIN: CPT | Mod: S$GLB,,, | Performed by: NEUROLOGICAL SURGERY

## 2022-04-06 PROCEDURE — 4010F ACE/ARB THERAPY RXD/TAKEN: CPT | Mod: CPTII,S$GLB,, | Performed by: NEUROLOGICAL SURGERY

## 2022-04-06 PROCEDURE — 99215 PR OFFICE/OUTPT VISIT, EST, LEVL V, 40-54 MIN: ICD-10-PCS | Mod: S$GLB,,, | Performed by: NEUROLOGICAL SURGERY

## 2022-04-06 PROCEDURE — 3078F PR MOST RECENT DIASTOLIC BLOOD PRESSURE < 80 MM HG: ICD-10-PCS | Mod: CPTII,S$GLB,, | Performed by: NEUROLOGICAL SURGERY

## 2022-04-06 RX ORDER — HYDROCODONE BITARTRATE AND ACETAMINOPHEN 10; 325 MG/1; MG/1
1 TABLET ORAL EVERY 12 HOURS PRN
Qty: 20 TABLET | Refills: 0 | Status: ON HOLD | OUTPATIENT
Start: 2022-04-06 | End: 2022-05-01 | Stop reason: HOSPADM

## 2022-04-06 NOTE — PROGRESS NOTES
Neurosurgery History & Physical    Patient ID: Oc Solis is a 61 y.o. male.    Chief Complaint   Patient presents with    Discuss surgery     Discuss surgery, cervical.       HPI:  Mr. Solis is a 62 year old male with HLD, HTN, anxiety, asthma, hyperglycemia who presents today for evaluation of neck and right arm pain. He had C6-7 cervical spine fusion in 2000 with Dr. Solorzano and lumbar spine surgery (unsure of levels) in 2014 with Dr. Verde. The pain in 2000 was down the left arm from the elbow to the medial three digits.  It was not as bad as it is currently.  Following surgery his symptoms completely resolved until the last 8 weeks. He denies any recent trauma or injury that precipitated the onset of the pain. More recently he also began right arm pain.  He has neck pain with tingling in his arms from the elbow into his 3-5th digits in R>L arms.  He really feels like it is almost the whole hand on the right side. He also has decreased  strength. He feels this is worse in the right hand as well. His arm symptoms are more bothersome than his neck pain as this affects his ability to perform his daily activities. He does not feel like he is having too much trouble walking.      He feels like he no longer has much useable function in his right hand.     He is taking Norco, zanaflex and ibuprofen for pain. He was evaluated by Denise Recinos in [facility] who recommended neurosurgical evaluation prior to proceeding with injections. He has not recently participated in PT but has in the past.     He was last seen on 3/15/2022.  At that time, plan was made to obtain a CT scan of the cervical spine to evaluate approach options.      He returns with his CT scan to discuss surgical planning for his radicular symptoms with likely underlying myelopathic component.      Review of Systems  Constitutional: Negative for activity change, chills, fatigue and unexpected weight change.   HENT: Negative  for hearing loss, tinnitus, trouble swallowing and voice change.    Eyes: Negative for visual disturbance.   Respiratory: Negative for apnea, chest tightness and shortness of breath.    Cardiovascular: Negative for chest pain and palpitations.   Gastrointestinal: Negative for abdominal pain, constipation, diarrhea, nausea and vomiting.   Genitourinary: Negative for difficulty urinating, dysuria and frequency.   Musculoskeletal: Positive for myalgias and neck pain. Negative for back pain, gait problem and neck stiffness.   Skin: Negative for wound.   Neurological: Positive for numbness and weakness. Negative for dizziness, tremors, seizures, facial asymmetry, speech difficulty, weakness, light-headedness and headaches.   Psychiatric/Behavioral: Negative for confusion and decreased concentration.     Past Medical History:   Diagnosis Date    Anxiety     Asthma     childhood    Candidiasis of skin and nails     Hyperglycemia     Hyperlipidemia     Hypertension     Laceration     Neck pain     Rectal/anal hemorrhage     Unspecified adverse effect of unspecified drug, medicinal and biological substance      Social History     Socioeconomic History    Marital status:    Tobacco Use    Smoking status: Never Smoker    Smokeless tobacco: Never Used   Substance and Sexual Activity    Alcohol use: Yes     Comment: 1 bottle of wine per week now pt reports    Drug use: No    Sexual activity: Yes     Partners: Female     Family History   Problem Relation Age of Onset    Thyroid disease Mother     Cancer Mother         lung    Hyperlipidemia Father     Heart disease Father      Review of patient's allergies indicates:   Allergen Reactions    Percocet [oxycodone-acetaminophen] Itching    Penicillins Rash       Current Outpatient Medications:     acetaminophen (TYLENOL) 500 MG tablet, Take 1,000 mg by mouth every 6 (six) hours as needed for Pain., Disp: , Rfl:     albuterol (PROVENTIL/VENTOLIN HFA) 90  "mcg/actuation inhaler, Inhale 2 puffs into the lungs every 4 (four) hours as needed for Wheezing or Shortness of Breath. Rescue, Disp: 18 g, Rfl: 1    aspirin (ECOTRIN) 81 MG EC tablet, Take 1 tablet (81 mg total) by mouth once daily., Disp: , Rfl: 0    blood sugar diagnostic Strp, To check BG onc times daily, to use with insurance preferred meter, Disp: 50 each, Rfl: 9    blood-glucose meter kit, Use to check glucose daily, Disp: 1 each, Rfl: 0    diazePAM (VALIUM) 5 MG tablet, Take 1 tablet (5 mg total) by mouth nightly as needed for Anxiety., Disp: 30 tablet, Rfl: 0    gabapentin (NEURONTIN) 300 MG capsule, Take 1 capsule (300 mg total) by mouth 3 (three) times daily., Disp: 90 capsule, Rfl: 1    lancets Misc, To check BG one times daily, to use with insurance preferred meter, Disp: 50 each, Rfl: 9    losartan (COZAAR) 50 MG tablet, Take 1 tablet (50 mg total) by mouth once daily. (Patient taking differently: Take 50 mg by mouth every evening.), Disp: 90 tablet, Rfl: 3    metFORMIN (GLUCOPHAGE) 500 MG tablet, Take 1 tablet (500 mg total) by mouth 2 (two) times daily with meals., Disp: 180 tablet, Rfl: 3    metoprolol tartrate (LOPRESSOR) 25 MG tablet, Take 1 tablet (25 mg total) by mouth 3 (three) times daily., Disp: 90 tablet, Rfl: 1    omeprazole (PRILOSEC) 20 MG capsule, Take 1 capsule (20 mg total) by mouth daily as needed (heartburn)., Disp: 90 capsule, Rfl: 1    rosuvastatin (CRESTOR) 5 MG tablet, Take 1 tablet (5 mg total) by mouth once daily., Disp: 90 tablet, Rfl: 3    tiZANidine (ZANAFLEX) 4 MG tablet, Take 4 mg by mouth nightly as needed., Disp: , Rfl:     HYDROcodone-acetaminophen (NORCO)  mg per tablet, Take 1 tablet by mouth every 12 (twelve) hours as needed for Pain. (Patient not taking: Reported on 4/6/2022), Disp: 20 tablet, Rfl: 0  Blood pressure 132/71, pulse (!) 48, resp. rate 18, height 5' 8" (1.727 m), weight 121.7 kg (268 lb 4.8 oz).      Neurologic Exam   Mental " Status   Oriented to person, place, and time.   Level of consciousness: alert     Cranial Nerves      CN III, IV, VI   Extraocular motions are normal.      Motor Exam   Muscle bulk: normal  Overall muscle tone: normal     Strength   Strength 5/5 except as noted.   4/5 right , triceps  4-/5 right wrist extension, wrist flexion      Sensory Exam   Right arm light touch: decreased from elbow     Gait, Coordination, and Reflexes      Gait  Gait: normal     Reflexes   Right Sandra: absent  Left Sandra: absent  Right ankle clonus: absent  Left ankle clonus: absent        Physical Exam  Constitutional:       Appearance: He is well-developed.   HENT:      Head: Normocephalic and atraumatic.   Eyes:      Extraocular Movements: EOM normal.      Conjunctiva/sclera: Conjunctivae normal.   Abdominal:      Tenderness: There is no guarding.   Musculoskeletal:         General: Normal range of motion.      Cervical back: Normal range of motion.   Skin:     General: Skin is warm and dry.   Neurological:      Mental Status: He is alert and oriented to person, place, and time.      Gait: Gait is intact.       Imaging:  CT scan of the cervical spine dated 3/21/2022 is personally reviewed and discussed with the patient.  The MRI of the cervical spine dated 2/23/2022 is also discussed again.      On CT scan it appears that access to the C7-T1 junction is obstructed by the clavicle/sternum and I do not feel an anterior approach would be feasible.    There is solid fusion at the C6-7 level.  There is anterolisthesis of C7 on T1 and retrolisthesis of C5 on C6.  There is retrolisthesis of C4 on C5 and there is a kyphotic deformity at this level.      Review of the MRI reveals moderate to severe stenosis centrally at the C7-T1 level and moderate canal stenosis at C4-5 and C5-6.      There is moderate neuroforaminal stenosis at C3-4 L > R.    There is severe bilateral neuroforaminal stenosis at C4-5 L > R  There is moderate to severe  neuroforaminal stenosis at C5-6 bilaterally  There is moderate neuroforaminal stenosis at C7-T1 bilaterally    Assessment/Plan:   62 year old male with history of prior C6-7 fusion in 2000 and repeat surgery in 2014.  Pain was for the left arm to the medial three digits at that time.  His pain has been well controlled since 2014 until the last 12 weeks or so.  He now has pain into the bilateral arms R > L into the 3-5th digits.  His arm hurts worse than his neck.  He is not having trouble walking.  He is not having bowel/bladder dysfunction.  He does report trouble using his right hand.  His symptoms appear to be more radicular but an underlying component of myelopathy is likely.       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      I discussed surgical options with the patient.  His central canal is most stenotic at the C7-T1 level due to facet hypertrophy likely due to adjacent level disease.  I do not feel this area can be addressed effectively from an anterior approach.    I think laminectomy and fusion at C7-T1 is needed to address this.  Unfortunately he also has moderate stenosis at C5-6 with spondylolisthesis at this level and the level above as well as kyphotic deformity at C4-5.  Therefore I feel the most complete procedure to address his mild to modearate stenosis at C4-5, C5-6, and C6-7 is laminectomy at these levels. Given his lack of lordosis I feel that fusion at these levels - extending up to the C3 level is indicated in order to maintain alignment.  I discussed other options with the patient.  After thorough discussion of risks and benefits, we elected to proceed with C3-T1 laminectomy and C3-T1 fusion posteriorly.  We will arrange the procedure and proceed at next availability.

## 2022-04-11 ENCOUNTER — TELEPHONE (OUTPATIENT)
Dept: NEUROSURGERY | Facility: CLINIC | Age: 62
End: 2022-04-11
Payer: COMMERCIAL

## 2022-04-11 NOTE — TELEPHONE ENCOUNTER
Case was denied and needs peer to peer     2nd Msg     Please call [phone]   REQUESTED PROCEDURES (7)   Code Description Status Reason Action   20930 Allograft For Spine Surgery Only Morselized   Non-Authorized Criteria Not Met   Clinical Rationale:   Your doctor told us that you are going to have surgery. This surgery is to join the bones of your spine. Your doctor wants to use a special material to help the bones fuse. We reviewed the notes we received. The notes do not show that you meet all required criteria for the main surgery. For this reason, use of this special material is not medically necessary. We used [employer] [facility] Guideline titled Spine Surgery, Bone Graft Substitutes and Bone Morphogenetic Proteins to make this decision. You may view this guideline at [email or web address].     21168 Autograft Spine Surgery Local From Same Incision   Non-Authorized Criteria Not Met   Clinical Rationale:   Your doctor told us that you are going to have surgery to join the bones of your spine. Your doctor wants to use your bone to help the bones fuse. We reviewed the notes we received. The notes do not show that you meet all required criteria for the main surgery. For this reason, use of your bone is not medically necessary. We used [employer] [facility] Guideline titled Spine Surgery, Bone Graft Substitutes and Bone Morphogenetic Proteins to make this decision. You may view this guideline at [email or web address].     09679 ARTHRD PST/PSTLAT TQ 1NTRSPC CRV BELW C2 SEGMENT (C5-C6)   Non-Authorized Criteria Not Met   Clinical Rationale:   Your doctor wants you to have surgery on your back. This surgery is to join the bones in your low back. This surgery is needed when certain criteria are met. A physical exam by your doctor should show signs of pressure on your nerve or bundle of nerves. These signs could include muscle weakness in your  legs, loss of feeling or abnormal reflexes. You need to have special pictures of your low back. These pictures could be an x-ray, CT or MRI. These pictures must show that a bone in your back moves forwards or backwards at least three millimeters. You must have had treatment for your pain that did not improve. You should have received two types of treatment. Treatment must include special exercises that help make muscles stronger. This is called physical therapy. Your back could be made unstable by the surgery. This could happen if more than 50 percent of both the supporting bones or 75 percent of one of the supporting bones need to be removed to take the pressure off the nerves. We reviewed the notes we received. The notes do not show that you meet all the criteria. For this reason, this surgery is not medically necessary. We used [facility] Guideline titled Spine Surgery, Lumbar Fusion and Treatment of Spinal Deformity (including Scoliosis and Kyphosis) to make this decision. You may view this guideline at [email or web address].     05207 ARTHRODESIS PST/PSTLAT TQ 1NTRSPC EA ADDL NTRSPC (C3-C4)   Non-Authorized Criteria Not Met   Clinical Rationale:   Your doctor wants you to have surgery on your back. This surgery is to join the bones in your low back. This surgery is needed when certain criteria are met. A physical exam by your doctor should show signs of pressure on your nerve or bundle of nerves. These signs could include muscle weakness in your legs, loss of feeling or abnormal reflexes. You need to have special pictures of your low back. These pictures could be an x-ray, CT or MRI. These pictures must show that a bone in your back moves forwards or backwards at least three millimeters. You must have had treatment for your pain that did not improve. You should have received two types of treatment. Treatment must include special exercises that help make muscles  stronger. This is called physical therapy. Your back could be made unstable by the surgery. This could happen if more than 50 percent of both the supporting bones or 75 percent of one of the supporting bones need to be removed to take the pressure off the nerves. We reviewed the notes we received. The notes do not show that you meet all the criteria. For this reason, this surgery is not medically necessary. We used [facility] Guideline titled Spine Surgery, Lumbar Fusion and Treatment of Spinal Deformity (including Scoliosis and Kyphosis) to make this decision. You may view this guideline at [email or web address].     89092 Posterior Segmental Instrumentation 3-6 Vrt Seg   Non-Authorized Criteria Not Met   Clinical Rationale:   Your doctor wants you to have surgery on your back. This surgery is to join the bones in your low back. This surgery is needed when certain criteria are met. A physical exam by your doctor should show signs of pressure on your nerve or bundle of nerves. These signs could include muscle weakness in your legs, loss of feeling or abnormal reflexes. You need to have special pictures of your low back. These pictures could be an x-ray, CT or MRI. These pictures must show that a bone in your back moves forwards or backwards at least three millimeters. You must have had treatment for your pain that did not improve. You should have received two types of treatment. Treatment must include special exercises that help make muscles stronger. This is called physical therapy. Your back could be made unstable by the surgery. This could happen if more than 50 percent of both the supporting bones or 75 percent of one of the supporting bones need to be removed to take the pressure off the nerves. We reviewed the notes we received. The notes do not show that you meet all the criteria. For this reason, this surgery is not medically necessary. We used [facility] Guideline titled Spine Surgery, Lumbar Fusion and Treatment of Spinal Deformity (including Scoliosis and Kyphosis) to make this decision. You may view this guideline at [email or web address].     32506 Laminectomy W/O Ffd > 2 Vert Seg Cervical (C6-C7)   Non-Authorized Criteria Not Met   Clinical Rationale:   Your doctor told us that you have low back pain and [weakness in your leg or numbness, tingling]. Your doctor wants you to have surgery to take pressure off the bundle of nerves that runs down your back by removing a part of a bone in your back. This surgery is needed when certain criteria are met. You should have leg pain, muscle weakness in your legs, loss of feeling or abnormal reflexes. Your symptoms should be made worse by standing and walking. You may feel better by leaning forward or sitting. Your pain should be limiting some of your normal activities. You also need to have special pictures of your low back. These pictures could be a CT or MRI. The results should match with your signs and symptoms. You need to have been treated for six weeks. You should have received two types of treatment. Treatment must include special exercises that help make muscles stronger. This is called physical therapy. We reviewed the notes we received. The notes do not show that you meet all the required criteria. For this reason, this surgery is not medically necessary. We used [facility] Guideline titled Spine Surgery, Lumbar Laminectomy to make this decision. You may view this guideline at [email or web address].     51338 SMITH FACETECTOMY&FORAMOT 1 VRT SGM EA ADDL SGM (T1)   Non-Authorized Criteria Not Met   Clinical Rationale:   Your doctor told us that you have low back pain and [weakness in your leg or numbness, tingling]. Your doctor wants you to have surgery to take pressure off the bundle of nerves that runs down your back by  removing a part of a bone in your back. This surgery is needed when certain criteria are met. You should have leg pain, muscle weakness in your legs, loss of feeling or abnormal reflexes. Your symptoms should be made worse by standing and walking. You may feel better by leaning forward or sitting. Your pain should be limiting some of your normal activities. You also need to have special pictures of your low back. These pictures could be a CT or MRI. The results should match with your signs and symptoms. You need to have been treated for six weeks. You should have received two types of treatment. Treatment must include special exercises that help make muscles stronger. This is called physical therapy. We reviewed the notes we received. The notes do not show that you meet all the required criteria. For this reason, this surgery is not medically necessary. We used [facility] Guideline titled Spine Surgery, Lumbar Laminectomy to make this decision. You may view this guideline at [email or web address].       Nessa Miller RN   [employer] Precertification Nurse-RN   [facility]   [phone] ( Phone)   [phone] ( Fax)   [email or web address]   Office Hours 8:30 am until 5:00 pm

## 2022-04-12 ENCOUNTER — TELEPHONE (OUTPATIENT)
Dept: NEUROSURGERY | Facility: CLINIC | Age: 62
End: 2022-04-12
Payer: COMMERCIAL

## 2022-04-12 NOTE — TELEPHONE ENCOUNTER
Time sensitive and needs to completed by this Thursday   [id number]     Case was denied and needs peer to peer       Please call [phone]   REQUESTED PROCEDURES (7)   Code Description Status Reason Action   20930 Allograft For Spine Surgery Only Morselized   Non-Authorized Criteria Not Met   Clinical Rationale:   Your doctor told us that you are going to have surgery. This surgery is to join the bones of your spine. Your doctor wants to use a special material to help the bones fuse. We reviewed the notes we received. The notes do not show that you meet all required criteria for the main surgery. For this reason, use of this special material is not medically necessary. We used [facility] Guideline titled Spine Surgery, Bone Graft Substitutes and Bone Morphogenetic Proteins to make this decision. You may view this guideline at [email or web address].     64236 Autograft Spine Surgery Local From Same Incision   Non-Authorized Criteria Not Met   Clinical Rationale:   Your doctor told us that you are going to have surgery to join the bones of your spine. Your doctor wants to use your bone to help the bones fuse. We reviewed the notes we received. The notes do not show that you meet all required criteria for the main surgery. For this reason, use of your bone is not medically necessary. We used [facility] Guideline titled Spine Surgery, Bone Graft Substitutes and Bone Morphogenetic Proteins to make this decision. You may view this guideline at [email or web address].     44750 ARTHRD PST/PSTLAT TQ 1NTRSPC CRV BELW C2 SEGMENT (C5-C6)   Non-Authorized Criteria Not Met   Clinical Rationale:   Your doctor wants you to have surgery on your back. This surgery is to join the bones in your low back. This surgery is needed when certain criteria are met. A physical exam by your doctor should show signs of pressure on your nerve or  bundle of nerves. These signs could include muscle weakness in your legs, loss of feeling or abnormal reflexes. You need to have special pictures of your low back. These pictures could be an x-ray, CT or MRI. These pictures must show that a bone in your back moves forwards or backwards at least three millimeters. You must have had treatment for your pain that did not improve. You should have received two types of treatment. Treatment must include special exercises that help make muscles stronger. This is called physical therapy. Your back could be made unstable by the surgery. This could happen if more than 50 percent of both the supporting bones or 75 percent of one of the supporting bones need to be removed to take the pressure off the nerves. We reviewed the notes we received. The notes do not show that you meet all the criteria. For this reason, this surgery is not medically necessary. We used [facility] Guideline titled Spine Surgery, Lumbar Fusion and Treatment of Spinal Deformity (including Scoliosis and Kyphosis) to make this decision. You may view this guideline at [email or web address].     08291 ARTHRODESIS PST/PSTLAT TQ 1NTRSPC EA ADDL NTRSPC (C3-C4)   Non-Authorized Criteria Not Met   Clinical Rationale:   Your doctor wants you to have surgery on your back. This surgery is to join the bones in your low back. This surgery is needed when certain criteria are met. A physical exam by your doctor should show signs of pressure on your nerve or bundle of nerves. These signs could include muscle weakness in your legs, loss of feeling or abnormal reflexes. You need to have special pictures of your low back. These pictures could be an x-ray, CT or MRI. These pictures must show that a bone in your back moves forwards or backwards at least three millimeters. You must have had treatment for your pain that did not improve. You should have received two types of treatment.  Treatment must include special exercises that help make muscles stronger. This is called physical therapy. Your back could be made unstable by the surgery. This could happen if more than 50 percent of both the supporting bones or 75 percent of one of the supporting bones need to be removed to take the pressure off the nerves. We reviewed the notes we received. The notes do not show that you meet all the criteria. For this reason, this surgery is not medically necessary. We used [facility] Guideline titled Spine Surgery, Lumbar Fusion and Treatment of Spinal Deformity (including Scoliosis and Kyphosis) to make this decision. You may view this guideline at [email or web address].     24354 Posterior Segmental Instrumentation 3-6 Vrt Seg   Non-Authorized Criteria Not Met   Clinical Rationale:   Your doctor wants you to have surgery on your back. This surgery is to join the bones in your low back. This surgery is needed when certain criteria are met. A physical exam by your doctor should show signs of pressure on your nerve or bundle of nerves. These signs could include muscle weakness in your legs, loss of feeling or abnormal reflexes. You need to have special pictures of your low back. These pictures could be an x-ray, CT or MRI. These pictures must show that a bone in your back moves forwards or backwards at least three millimeters. You must have had treatment for your pain that did not improve. You should have received two types of treatment. Treatment must include special exercises that help make muscles stronger. This is called physical therapy. Your back could be made unstable by the surgery. This could happen if more than 50 percent of both the supporting bones or 75 percent of one of the supporting bones need to be removed to take the pressure off the nerves. We reviewed the notes we received. The notes do not show that you meet all the criteria. For this reason,  this surgery is not medically necessary. We used [facility] Guideline titled Spine Surgery, Lumbar Fusion and Treatment of Spinal Deformity (including Scoliosis and Kyphosis) to make this decision. You may view this guideline at [email or web address].     49486 Laminectomy W/O Ffd > 2 Vert Seg Cervical (C6-C7)   Non-Authorized Criteria Not Met   Clinical Rationale:   Your doctor told us that you have low back pain and [weakness in your leg or numbness, tingling]. Your doctor wants you to have surgery to take pressure off the bundle of nerves that runs down your back by removing a part of a bone in your back. This surgery is needed when certain criteria are met. You should have leg pain, muscle weakness in your legs, loss of feeling or abnormal reflexes. Your symptoms should be made worse by standing and walking. You may feel better by leaning forward or sitting. Your pain should be limiting some of your normal activities. You also need to have special pictures of your low back. These pictures could be a CT or MRI. The results should match with your signs and symptoms. You need to have been treated for six weeks. You should have received two types of treatment. Treatment must include special exercises that help make muscles stronger. This is called physical therapy. We reviewed the notes we received. The notes do not show that you meet all the required criteria. For this reason, this surgery is not medically necessary. We used [facility] Guideline titled Spine Surgery, Lumbar Laminectomy to make this decision. You may view this guideline at [email or web address].     87948 SMITH FACETECTOMY&FORAMOT 1 VRT SGM EA ADDL SGM (T1)   Non-Authorized Criteria Not Met   Clinical Rationale:   Your doctor told us that you have low back pain and [weakness in your leg or numbness, tingling]. Your doctor wants you to have surgery to take  pressure off the bundle of nerves that runs down your back by removing a part of a bone in your back. This surgery is needed when certain criteria are met. You should have leg pain, muscle weakness in your legs, loss of feeling or abnormal reflexes. Your symptoms should be made worse by standing and walking. You may feel better by leaning forward or sitting. Your pain should be limiting some of your normal activities. You also need to have special pictures of your low back. These pictures could be a CT or MRI. The results should match with your signs and symptoms. You need to have been treated for six weeks. You should have received two types of treatment. Treatment must include special exercises that help make muscles stronger. This is called physical therapy. We reviewed the notes we received. The notes do not show that you meet all the required criteria. For this reason, this surgery is not medically necessary. We used [facility] Guideline titled Spine Surgery, Lumbar Laminectomy to make this decision. You may view this guideline at [email or web address].

## 2022-04-19 DIAGNOSIS — M54.12 CERVICAL RADICULOPATHY: Primary | ICD-10-CM

## 2022-04-29 PROBLEM — Z98.1 S/P CERVICAL SPINAL FUSION: Status: ACTIVE | Noted: 2022-04-29

## 2022-04-29 PROBLEM — E11.9 DIABETES MELLITUS, TYPE 2: Status: ACTIVE | Noted: 2022-04-29

## 2022-05-01 PROBLEM — G95.20 CERVICAL SPINAL CORD COMPRESSION: Status: ACTIVE | Noted: 2022-05-01

## 2022-05-03 ENCOUNTER — PATIENT MESSAGE (OUTPATIENT)
Dept: NEUROSURGERY | Facility: CLINIC | Age: 62
End: 2022-05-03
Payer: COMMERCIAL

## 2022-05-10 ENCOUNTER — CLINICAL SUPPORT (OUTPATIENT)
Dept: NEUROSURGERY | Facility: CLINIC | Age: 62
End: 2022-05-10
Payer: COMMERCIAL

## 2022-05-10 RX ORDER — HYDROCODONE BITARTRATE AND ACETAMINOPHEN 10; 325 MG/1; MG/1
1 TABLET ORAL EVERY 6 HOURS PRN
Qty: 28 TABLET | Refills: 0 | Status: SHIPPED | OUTPATIENT
Start: 2022-05-10 | End: 2022-06-08 | Stop reason: SDUPTHER

## 2022-05-10 RX ORDER — METHOCARBAMOL 500 MG/1
500 TABLET, FILM COATED ORAL EVERY 8 HOURS PRN
Qty: 30 TABLET | Refills: 0 | Status: SHIPPED | OUTPATIENT
Start: 2022-05-10 | End: 2022-05-20

## 2022-05-10 NOTE — PROGRESS NOTES
Pt is 14 days s/p cervical spine fusion with Dr. Mercado. No s/s of infection. Incision cleaned with chloraprep and staples removed with no issue. Incision is warm, dry, intact. Pt reports post-operative pain level less than pre-operative state. Pt is requesting medication refill today. Pt re-educated on narcotics policy. Educated patient on weight lifting status, bending/lifting/twisting, and to call with any changes or questions. Pt aware of imaging and f/u appt with provider. No further questions.

## 2022-05-24 ENCOUNTER — TELEPHONE (OUTPATIENT)
Dept: NEUROSURGERY | Facility: CLINIC | Age: 62
End: 2022-05-24
Payer: COMMERCIAL

## 2022-05-24 NOTE — TELEPHONE ENCOUNTER
What does he do for work?     Recommend no high impact activity, lifting 10lbs or less and using proper body mechanics. He may need to take breaks often and if so he should be able to do this at work or wait until his follow up with us to decide.

## 2022-06-08 ENCOUNTER — HOSPITAL ENCOUNTER (OUTPATIENT)
Dept: RADIOLOGY | Facility: HOSPITAL | Age: 62
Discharge: HOME | End: 2022-06-08
Attending: NEUROLOGICAL SURGERY
Payer: COMMERCIAL

## 2022-06-08 ENCOUNTER — OFFICE VISIT (OUTPATIENT)
Dept: NEUROSURGERY | Facility: CLINIC | Age: 62
End: 2022-06-08
Payer: COMMERCIAL

## 2022-06-08 ENCOUNTER — PATIENT MESSAGE (OUTPATIENT)
Dept: NEUROSURGERY | Facility: CLINIC | Age: 62
End: 2022-06-08

## 2022-06-08 VITALS
BODY MASS INDEX: 40.6 KG/M2 | HEIGHT: 68 IN | RESPIRATION RATE: 18 BRPM | SYSTOLIC BLOOD PRESSURE: 140 MMHG | DIASTOLIC BLOOD PRESSURE: 68 MMHG | WEIGHT: 267.88 LBS | HEART RATE: 53 BPM

## 2022-06-08 DIAGNOSIS — Z98.1 S/P CERVICAL SPINAL FUSION: Primary | ICD-10-CM

## 2022-06-08 DIAGNOSIS — M54.12 CERVICAL RADICULOPATHY: ICD-10-CM

## 2022-06-08 PROCEDURE — 99024 PR POST-OP FOLLOW-UP VISIT: ICD-10-PCS | Mod: S$GLB,,, | Performed by: NURSE PRACTITIONER

## 2022-06-08 PROCEDURE — 3077F SYST BP >= 140 MM HG: CPT | Mod: CPTII,S$GLB,, | Performed by: NURSE PRACTITIONER

## 2022-06-08 PROCEDURE — 4010F PR ACE/ARB THEARPY RXD/TAKEN: ICD-10-PCS | Mod: CPTII,S$GLB,, | Performed by: NURSE PRACTITIONER

## 2022-06-08 PROCEDURE — 72040 X-RAY EXAM NECK SPINE 2-3 VW: CPT | Mod: 26,,, | Performed by: RADIOLOGY

## 2022-06-08 PROCEDURE — 3066F NEPHROPATHY DOC TX: CPT | Mod: CPTII,S$GLB,, | Performed by: NURSE PRACTITIONER

## 2022-06-08 PROCEDURE — 3008F BODY MASS INDEX DOCD: CPT | Mod: CPTII,S$GLB,, | Performed by: NURSE PRACTITIONER

## 2022-06-08 PROCEDURE — 99024 POSTOP FOLLOW-UP VISIT: CPT | Mod: S$GLB,,, | Performed by: NURSE PRACTITIONER

## 2022-06-08 PROCEDURE — 1159F PR MEDICATION LIST DOCUMENTED IN MEDICAL RECORD: ICD-10-PCS | Mod: CPTII,S$GLB,, | Performed by: NURSE PRACTITIONER

## 2022-06-08 PROCEDURE — 3078F PR MOST RECENT DIASTOLIC BLOOD PRESSURE < 80 MM HG: ICD-10-PCS | Mod: CPTII,S$GLB,, | Performed by: NURSE PRACTITIONER

## 2022-06-08 PROCEDURE — 3077F PR MOST RECENT SYSTOLIC BLOOD PRESSURE >= 140 MM HG: ICD-10-PCS | Mod: CPTII,S$GLB,, | Performed by: NURSE PRACTITIONER

## 2022-06-08 PROCEDURE — 3066F PR DOCUMENTATION OF TREATMENT FOR NEPHROPATHY: ICD-10-PCS | Mod: CPTII,S$GLB,, | Performed by: NURSE PRACTITIONER

## 2022-06-08 PROCEDURE — 3078F DIAST BP <80 MM HG: CPT | Mod: CPTII,S$GLB,, | Performed by: NURSE PRACTITIONER

## 2022-06-08 PROCEDURE — 3061F PR NEG MICROALBUMINURIA RESULT DOCUMENTED/REVIEW: ICD-10-PCS | Mod: CPTII,S$GLB,, | Performed by: NURSE PRACTITIONER

## 2022-06-08 PROCEDURE — 3008F PR BODY MASS INDEX (BMI) DOCUMENTED: ICD-10-PCS | Mod: CPTII,S$GLB,, | Performed by: NURSE PRACTITIONER

## 2022-06-08 PROCEDURE — 3061F NEG MICROALBUMINURIA REV: CPT | Mod: CPTII,S$GLB,, | Performed by: NURSE PRACTITIONER

## 2022-06-08 PROCEDURE — 3044F HG A1C LEVEL LT 7.0%: CPT | Mod: CPTII,S$GLB,, | Performed by: NURSE PRACTITIONER

## 2022-06-08 PROCEDURE — 4010F ACE/ARB THERAPY RXD/TAKEN: CPT | Mod: CPTII,S$GLB,, | Performed by: NURSE PRACTITIONER

## 2022-06-08 PROCEDURE — 72040 X-RAY EXAM NECK SPINE 2-3 VW: CPT | Mod: TC,FY,PO

## 2022-06-08 PROCEDURE — 72040 XR CERVICAL SPINE AP LATERAL: ICD-10-PCS | Mod: 26,,, | Performed by: RADIOLOGY

## 2022-06-08 PROCEDURE — 3044F PR MOST RECENT HEMOGLOBIN A1C LEVEL <7.0%: ICD-10-PCS | Mod: CPTII,S$GLB,, | Performed by: NURSE PRACTITIONER

## 2022-06-08 PROCEDURE — 1159F MED LIST DOCD IN RCRD: CPT | Mod: CPTII,S$GLB,, | Performed by: NURSE PRACTITIONER

## 2022-06-08 RX ORDER — TIZANIDINE 4 MG/1
4 TABLET ORAL EVERY 8 HOURS PRN
Qty: 30 TABLET | Refills: 0 | Status: SHIPPED | OUTPATIENT
Start: 2022-06-08 | End: 2022-07-20 | Stop reason: SDUPTHER

## 2022-06-08 RX ORDER — HYDROCODONE BITARTRATE AND ACETAMINOPHEN 10; 325 MG/1; MG/1
1 TABLET ORAL EVERY 8 HOURS PRN
Qty: 21 TABLET | Refills: 0 | Status: SHIPPED | OUTPATIENT
Start: 2022-06-08 | End: 2022-08-29

## 2022-06-08 NOTE — PROGRESS NOTES
Neurosurgery History & Physical    Patient ID: Oc Solis is a 62 y.o. male.    Chief Complaint   Patient presents with    Neck Pain     Pain gets worse as the day wears on. Pain begins btw shoulder blades. Also pain in thighs btw wist and knees. Left hand and feet tingle. Swelling in ankles.     Dentis wants to know when he can pull a wisdom tooth.       History of Present Illness:   Mr. Solis is a 62 year old male who presents today for postoperative evaluation. He is 6 weeks s/p posterior cervical fusion and laminectomy C3-T1. Preoperatively he reported neck and right >left arm pain, numbness/tingling. He had decreased  strength and dexterity issues in R>L hands.     Postoperatively he reports improvement in hand numbness/tingling and dexterity has improved. He reports he is walking normal. He does report bilateral anterior thigh numbness postoperatively. This was thought to be due to positioning but has not improved much. He has chronic tingling in his feet which could be peripheral neuropathy and PCP recommended EMG. He has not had this done yet.     His wound is well healed. She denies fever, chills, drainage.     Review of Systems   Constitutional: Negative for activity change, chills and fever.   HENT: Negative for hearing loss and trouble swallowing.    Eyes: Negative for photophobia and visual disturbance.   Respiratory: Negative for cough.    Cardiovascular: Negative for leg swelling.   Gastrointestinal: Negative for nausea.   Genitourinary: Negative for difficulty urinating.   Musculoskeletal: Positive for gait problem and myalgias. Negative for arthralgias, back pain and neck pain.   Skin: Negative for wound (well healed).   Neurological: Negative for dizziness, weakness, numbness and headaches.   Psychiatric/Behavioral: Negative for confusion.       Past Medical History:   Diagnosis Date    Anxiety     Asthma     childhood    Candidiasis of skin and nails     Hyperglycemia      Hyperlipidemia     Hypertension     Laceration     Neck pain     Rectal/anal hemorrhage     Unspecified adverse effect of unspecified drug, medicinal and biological substance      Social History     Socioeconomic History    Marital status:    Tobacco Use    Smoking status: Never Smoker    Smokeless tobacco: Never Used   Substance and Sexual Activity    Alcohol use: Yes     Alcohol/week: 8.0 standard drinks     Types: 2 Cans of beer, 6 Glasses of wine per week     Comment: less than1 bottle of wine per week now pt reports    Drug use: No    Sexual activity: Yes     Partners: Female     Family History   Problem Relation Age of Onset    Thyroid disease Mother     Cancer Mother         lung    Hyperlipidemia Father     Heart disease Father      Review of patient's allergies indicates:   Allergen Reactions    Percocet [oxycodone-acetaminophen] Itching    Penicillins Rash       Current Outpatient Medications:     acetaminophen (TYLENOL) 500 MG tablet, Take 1,000 mg by mouth every 6 (six) hours as needed for Pain., Disp: , Rfl:     albuterol (PROVENTIL/VENTOLIN HFA) 90 mcg/actuation inhaler, Inhale 2 puffs into the lungs every 4 (four) hours as needed for Wheezing or Shortness of Breath. Rescue, Disp: 18 g, Rfl: 1    blood sugar diagnostic Strp, To check BG onc times daily, to use with insurance preferred meter, Disp: 50 each, Rfl: 9    blood-glucose meter kit, Use to check glucose daily, Disp: 1 each, Rfl: 0    diazePAM (VALIUM) 5 MG tablet, Take 1 tablet (5 mg total) by mouth nightly as needed for Anxiety., Disp: 30 tablet, Rfl: 0    gabapentin (NEURONTIN) 300 MG capsule, Take 1 capsule (300 mg total) by mouth 3 (three) times daily. (Patient taking differently: Take 300 mg by mouth 3 (three) times daily as needed.), Disp: 90 capsule, Rfl: 1    HYDROcodone-acetaminophen (NORCO)  mg per tablet, Take 1 tablet by mouth every 6 (six) hours as needed for Pain., Disp: 28 tablet, Rfl: 0     "lancets Misc, To check BG one times daily, to use with insurance preferred meter, Disp: 50 each, Rfl: 9    losartan (COZAAR) 50 MG tablet, Take 1 tablet (50 mg total) by mouth once daily. (Patient taking differently: Take 50 mg by mouth every evening.), Disp: 90 tablet, Rfl: 3    metFORMIN (GLUCOPHAGE) 500 MG tablet, Take 1 tablet (500 mg total) by mouth 2 (two) times daily with meals., Disp: 180 tablet, Rfl: 3    metoprolol tartrate (LOPRESSOR) 25 MG tablet, TAKE 1 TABLET BY MOUTH THREE TIMES A DAY, Disp: 90 tablet, Rfl: 1    omeprazole (PRILOSEC) 20 MG capsule, Take 1 capsule (20 mg total) by mouth daily as needed (heartburn)., Disp: 90 capsule, Rfl: 1    rosuvastatin (CRESTOR) 5 MG tablet, Take 1 tablet (5 mg total) by mouth once daily., Disp: 90 tablet, Rfl: 3    tiZANidine (ZANAFLEX) 4 MG tablet, Take 4 mg by mouth nightly as needed., Disp: , Rfl:   Blood pressure (!) 140/68, pulse (!) 53, resp. rate 18, height 5' 8" (1.727 m), weight 121.5 kg (267 lb 13.7 oz).      Neurologic Exam     Mental Status   Oriented to person, place, and time.   Level of consciousness: alert    Cranial Nerves     CN III, IV, VI   Extraocular motions are normal.     CN VII   Facial expression full, symmetric.     Motor Exam   Muscle bulk: normal  Overall muscle tone: normal    Strength   Strength 5/5 throughout.     Sensory Exam   Light touch normal.   Mild hypoesthesia bilateral anterior thighs     Gait, Coordination, and Reflexes     Gait  Gait: normal    Reflexes   Right Sandra: absent  Left Sandra: absent      Physical Exam  Constitutional:       Appearance: He is well-developed.   HENT:      Head: Normocephalic and atraumatic.   Eyes:      Extraocular Movements: EOM normal.      Conjunctiva/sclera: Conjunctivae normal.   Abdominal:      Tenderness: There is no guarding.   Musculoskeletal:         General: Normal range of motion.      Cervical back: Normal range of motion.   Skin:     General: Skin is warm and dry. "   Neurological:      Mental Status: He is alert and oriented to person, place, and time.      Gait: Gait is intact.      Deep Tendon Reflexes: Strength normal.         Imaging:   XR cervical spine dated 6/8/22 personally reviewed and demonstrates postoperative changes from C3-T1 with stable spinal alignment and intact hardware.     Assessment & Plan:   1. S/P cervical spinal fusion  Ambulatory referral/consult to Physical/Occupational Therapy       62 year old s/p C3-T1 posterior cervical fusion and laminectomy with overall improvement in preoperative symptoms. I recommended he wean from the collar although he is still fearful of not wearing it. He may start outpatient therapy. This will be helpful to also increase ROM and strengthening paraspinous musculature. Will follow up at 12 weeks postop via phone and at 1 year postop with CT cervical spine w/o contrast.

## 2022-06-20 ENCOUNTER — TELEPHONE (OUTPATIENT)
Dept: NEUROSURGERY | Facility: CLINIC | Age: 62
End: 2022-06-20
Payer: COMMERCIAL

## 2022-06-22 ENCOUNTER — TELEPHONE (OUTPATIENT)
Dept: NEUROSURGERY | Facility: CLINIC | Age: 62
End: 2022-06-22
Payer: COMMERCIAL

## 2022-06-22 ENCOUNTER — CLINICAL SUPPORT (OUTPATIENT)
Dept: REHABILITATION | Facility: HOSPITAL | Age: 62
End: 2022-06-22
Payer: COMMERCIAL

## 2022-06-22 DIAGNOSIS — Z98.1 S/P CERVICAL SPINAL FUSION: Primary | ICD-10-CM

## 2022-06-22 DIAGNOSIS — R29.3 POOR POSTURE: ICD-10-CM

## 2022-06-22 DIAGNOSIS — Z98.1 S/P CERVICAL SPINAL FUSION: ICD-10-CM

## 2022-06-22 DIAGNOSIS — R29.898 DECREASED RANGE OF MOTION OF NECK: ICD-10-CM

## 2022-06-22 DIAGNOSIS — R29.898 WEAKNESS OF BOTH UPPER EXTREMITIES: ICD-10-CM

## 2022-06-22 PROCEDURE — 97161 PT EVAL LOW COMPLEX 20 MIN: CPT | Mod: PO

## 2022-06-22 PROCEDURE — 97110 THERAPEUTIC EXERCISES: CPT | Mod: PO

## 2022-06-22 NOTE — PLAN OF CARE
[facility]  Physical Therapy Initial Evaluation    Name: Oc Solis  Clinic Number: [id number]    Therapy Diagnosis:   Encounter Diagnoses   Name Primary?    S/P cervical spinal fusion     Decreased range of motion of neck     Weakness of both upper extremities     Poor posture      Physician: Donna Angeles NP    Physician Orders: PT Eval and Treat   Medical Diagnosis from Referral: Z98.1 (ICD-10-CM) - S/P cervical spinal fusion  Evaluation Date: 6/22/2022  Authorization Period Expiration: 06/08/2023   Plan of Care Expiration: 8/26/22  Visit # / Visits authorized: 1/ 1    Time In: 1:00  Time Out: 1:50  Total Billable Time: 10 minutes    Precautions: Standard and C3-T1 fusion on 4/29/22    Subjective   Date of onset: C3-T1 fusion on 4/29/22  History of current condition - Oc reports: Pt is now 7.5 weeks post op from cervical fusion. He has been having a lot of tightness in the neck since surgery. He is still have trouble laying on his back, he has been sleeping in a recliner since surgery. He has tried to sleep in his bed the last two nights, but props himself upright and then goes back to the recliner when he wakes up. He has been pretty apprehensive to moving his neck since surgery. Has been out of the brace since 6/8/22. He usually works as a  for [employer], requires a lot of heavy lifting. He has returned to work on light duty, does not feel comfortable driving yet.      Medical History:   Past Medical History:   Diagnosis Date    Anxiety     Asthma     childhood    Candidiasis of skin and nails     Hyperglycemia     Hyperlipidemia     Hypertension     Laceration     Neck pain     Rectal/anal hemorrhage     Unspecified adverse effect of unspecified drug, medicinal and biological substance        Surgical History:   Oc Solis  has a past surgical history that includes Kyphosis surgery (03/19/2013); Colonoscopy (N/A, 1/17/2017); Cervical fusion  (07/2000); Fusion of posterior column of cervical spine using computer aided navigation (N/A, 4/29/2022); and Posterior cervical laminectomy (4/29/2022).    Medications:   Oc has a current medication list which includes the following prescription(s): acetaminophen, albuterol, blood sugar diagnostic, blood-glucose meter, diazepam, gabapentin, hydrocodone-acetaminophen, lancets, losartan, metformin, metoprolol tartrate, omeprazole, rosuvastatin, tizanidine, and [DISCONTINUED] aspirin.    Allergies:   Review of patient's allergies indicates:   Allergen Reactions    Percocet [oxycodone-acetaminophen] Itching    Penicillins Rash        Imaging, xray: 04/30/2022   FINDINGS: Interval C3-T1 posterior fusion with bilateral lateral mass screws at C3-6 and bilateral transpedicular screws at T1.  Bilateral posterior rods.  C3-C7 laminectomies.  Evidence of hardware loosening.  The right T1 screw projects over the C7-T1 disc space.  Solid osseous fusion of the C6 and 7 vertebral bodies.  No acute osseous fracture.  Prominent anterior osteophytes C4-5.  No unexpected radiodense soft tissue foreign body.  Dorsal skin staples.  Neck brace in place.    06/08/2022   FINDINGS: Since the prior study, posterior skin staples have been removed.  Otherwise, there is no definite change in the appearance of the cervical spine compared to the prior study.  There is solid bony fusion of C6 and C7.  There is marked disc space narrowing at the C4-5 and C5-6 levels where there is endplate osteophyte formation.  The odontoid process is intact.  There is only mild disc space narrowing at the C3-4 level.  There are postsurgical changes of posterior instrumented fusion from C3 through T1 with bilateral lateral mass screws at C3, C4, C5 and C6.  There are bilateral pedicle screws at the T1 level.  As previously demonstrated, the right T1 screw projects over the C7-T1 disc space.  There is no obvious hardware fracture or loosening.  The  prevertebral soft tissues appear normal.    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 Significant hypomobility of the neck in all directions. Pain with resisted shoulder flexion.     Cervical Range of Motion:    Degrees Pain   Flexion 25 No     Extension 10 No     Right Rotation 23 No     Left Rotation 23 No     Right Side Bending 10 No   Left Side Bending 10 No      Shoulder Range of Motion:   Shoulder Left Right   Flexion 150 150   Abduction 130 130     Upper Extremity Strength  (R) UE  (L) UE    Shoulder flexion: 4/5 Shoulder flexion: 4/5   Shoulder Abduction: 5/5 Shoulder abduction: 5/5   Shoulder ER 5/5 Shoulder ER 5/5   Shoulder IR 5/5 Shoulder IR 5/5   Elbow flexion: 5/5 Elbow flexion: 5/5   Elbow extension: 5/5 Elbow extension: 5/5   Wrist extension: 5/5 Wrist extension: 5/5       Joint Mobility: Hypomobility throughout the cervical spine    Palpation: Moderate to severe TTP in B upper traps    Sensation: Intact to light touch     Flexibility: Decreased pec minor flexibility      CMS Impairment/Limitation/Restriction for FOTO NECK Survey    Therapist reviewed FOTO scores for Oc Solis on 6/22/2022.   FOTO documents entered into [employer] - see Media section.    Limitation Score: 45%  Category: Mobility       TREATMENT   Treatment Time In:  1:40  Treatment Time Out: 1:50  Total Treatment time separate from Evaluation: 10 minutes    Oc received therapeutic exercises to develop strength, ROM, flexibility and posture for 10 minutes including:  Cervical rotation AROM, 1x10 B  Cervical side flexion AROM, 1x10 B  Corner pec stretch, 3x 30s   Cervical isometrics, 1x10 w/ 3s hold - flex / ext / side bend / rotation  Standing rows w/ blue band, 1x10  No moneys w/ green band, 1x10    Home Exercises and Patient Education Provided    Education provided:   - Role of PT, PT POC, PT diagnosis, PT prognosis, HEP    Written Home Exercises Provided: yes.  Exercises were reviewed and Oc was able to demonstrate them prior to the end of the session.  Oc demonstrated good  understanding of the education provided.     See EMR under Patient Instructions for exercises provided 6/22/2022.    Assessment   Oc is a 62 y.o. male referred to outpatient Physical Therapy with a medical diagnosis of Z98.1 (ICD-10-CM) - S/P cervical spinal fusion. Physical exam is consistent with decreased cervical ROM, strength, and functional mobility secondary to C3-T1 fusion. Primary impairments include AROM, PROM, joint mobility, strength, soft tissue restrictions, and pain which limits functional mobility. This pt is an excellent candidate for skilled PT tx and stands to benefit from a combination of manual therapy including joint mobilizations with trigger point/myofacscial release, therapeutic exercise to establish core/joint stability, neuromuscular re-education, dry needling, and modalities Prn. The pt has been educated on their dx/POC and consents to further PT tx.    Pt prognosis is Good.   Pt will benefit from skilled outpatient Physical Therapy to address the deficits stated above and in the chart below, provide pt/family education, and to maximize pt's level of independence.     Plan of care discussed with patient: Yes  Pt's spiritual, cultural and educational needs  considered and patient is agreeable to the plan of care and goals as stated below:     Anticipated Barriers for therapy: None    Medical Necessity is demonstrated by the following  History  Co-morbidities and personal factors that may impact the plan of care Co-morbidities:   diabetes, difficulty sleeping, high BMI and HTN    Personal Factors:   no deficits     moderate   Examination  Body Structures and Functions, activity limitations and participation restrictions that may impact the plan of care Body Regions:   neck  lower extremities    Body Systems:    ROM  strength  motor control    Participation Restrictions:   None    Activity limitations:   Learning and applying knowledge  no deficits    General Tasks and Commands  no deficits    Communication  no deficits    Mobility  lifting and carrying objects  driving (bike, car, motorcycle)    Self care  no deficits    Domestic Life  doing house work (cleaning house, washing dishes, laundry)    Interactions/Relationships  no deficits    Life Areas  employment    Community and Social Life  recreation and leisure         moderate   Clinical Presentation stable and uncomplicated low   Decision Making/ Complexity Score: low     Goals:  Short Term Goals (3 Weeks):   1) Pt will demonstrate compliance with initial home exercise program as prescribed by physical therapist to improve independence with management of condition.  2) Pt to improve active range of motion in cervical spine to 40 degrees rotation to allow for improved functional mobility.  3) Pt to report cervical pain of <4/10 at worst to improve tolerance to ADLs and work.    Long Term Goals (6 Weeks):   1. Pt to achieve <37% limitation as measured by the FOTO to demonstrate decreased disability.  2) Pt to improve active range of motion in cervical spine to 30 degrees side bending to allow for improved functional mobility.  3) Pt to report cervical pain of <2/10 at worst to improve tolerance to ADLs and work.  4)  Pt to increase strength to at least 5/5 of muscles tested to allow for improvement in functional activities.    Plan   Plan of care Certification: 6/22/2022 to 8/26/22.    Outpatient Physical Therapy 2 times weekly for 6 weeks to include the following interventions: Electrical Stimulation  , Manual Therapy, Moist Heat/ Ice, Neuromuscular Re-ed, Patient Education, Therapeutic Activities and Therapeutic Exercise.     Renan Sampson, PT

## 2022-06-22 NOTE — TELEPHONE ENCOUNTER
This should go through PCP as patient reported his PCP recommended this.     If he wants us to order, I will put the order if no lumbar spine nerve irritation, he will follow up with PCP to discuss further.

## 2022-06-24 ENCOUNTER — CLINICAL SUPPORT (OUTPATIENT)
Dept: REHABILITATION | Facility: HOSPITAL | Age: 62
End: 2022-06-24
Payer: COMMERCIAL

## 2022-06-24 DIAGNOSIS — R29.3 POOR POSTURE: ICD-10-CM

## 2022-06-24 DIAGNOSIS — R29.898 DECREASED RANGE OF MOTION OF NECK: Primary | ICD-10-CM

## 2022-06-24 DIAGNOSIS — R29.898 WEAKNESS OF BOTH UPPER EXTREMITIES: ICD-10-CM

## 2022-06-24 DIAGNOSIS — Z98.1 S/P CERVICAL SPINAL FUSION: ICD-10-CM

## 2022-06-24 PROCEDURE — 97140 MANUAL THERAPY 1/> REGIONS: CPT | Mod: PO

## 2022-06-24 PROCEDURE — 97112 NEUROMUSCULAR REEDUCATION: CPT | Mod: PO

## 2022-06-24 PROCEDURE — 97110 THERAPEUTIC EXERCISES: CPT | Mod: PO

## 2022-06-24 NOTE — PROGRESS NOTES
Physical Therapy Daily Treatment Note     Name: Oc Solis  Clinic Number: [id number]    Therapy Diagnosis:   Encounter Diagnoses   Name Primary?    Decreased range of motion of neck Yes    Weakness of both upper extremities     S/P cervical spinal fusion     Poor posture      Physician: Donna Angeles NP    Visit Date: 6/24/2022  Physician Orders: PT Eval and Treat   Medical Diagnosis from Referral: Z98.1 (ICD-10-CM) - S/P cervical spinal fusion  Evaluation Date: 6/22/2022  Authorization Period Expiration: 12/31/2022  Plan of Care Expiration: 8/26/22  Visit # / Visits authorized: 1 / 20    Time In: 2:00  Time Out: 3:00  Total Billable Time: 60 minutes    Precautions: Standard and C3-T1 fusion on 4/29/22    Subjective     Pt reports: Pt does not have any pain to start the session, only tightness in the neck. Has been doing his HEP, most difficulty with side flexion.   He was compliant with home exercise program.  Response to previous treatment: No adverse effect  Functional change: Too soon    Pain: 0/10  Location: bilateral neck and upper traps      Objective     Oc received therapeutic exercises to develop strength, endurance, ROM, flexibility and posture for 25 minutes including:  UBE, 3' fwd / 3' bwd  Supine pec stretch on half foam, 3 mins  Supine shoulder flexion, 2x10 3# wand  Cervical rotation AROM, 2x10 B  Cervical side flexion AROM, 2x10 B  Cervical isometrics, 1x10 w/ 3s hold - flex / ext / side bend / rotation  Upper trap stretch, 3x 30s - pain free    Oc received the following manual therapy techniques: Joint mobilizations and Soft tissue Mobilization were applied to the: cervical spine for 20 minutes, including:  PA mobs  Gentle distraction  STM to cervical paraspinal  STM to B upper traps    Oc participated in neuromuscular re-education activities to improve: Coordination, Proprioception and Posture for 15 minutes. The following activities were included:  Supine horizontal abd w/  green band, 2x10  Supine Xs w/ green band, 2x10  No moneys w/ green band, 2x10  Standing rows w/ blue band, 3x10  Standing shoulder ext w/ blue band, 2x10    Home Exercises Provided and Patient Education Provided     Education provided:   - Continue HEP    Written Home Exercises Provided: Patient instructed to cont prior HEP.  Exercises were reviewed and Oc was able to demonstrate them prior to the end of the session.  Oc demonstrated good  understanding of the education provided.     See EMR under Patient Instructions for exercises provided 6/22/22.    Assessment     Pt tolerated tx well. Significant hypomobility with cervical AROM. Began session with significant TTP in B upper traps which did improve after STM to B upper traps and cervical musculature. Pt needs moderate cuing for posture with exercises.   Oc Is progressing well towards his goals.   Pt prognosis is Good.     Pt will continue to benefit from skilled outpatient physical therapy to address the deficits listed in the problem list box on initial evaluation, provide pt/family education and to maximize pt's level of independence in the home and community environment.     Pt's spiritual, cultural and educational needs considered and pt agreeable to plan of care and goals.    Anticipated barriers to physical therapy: None    Goals:   Short Term Goals (3 Weeks):   1) Pt will demonstrate compliance with initial home exercise program as prescribed by physical therapist to improve independence with management of condition.  2) Pt to improve active range of motion in cervical spine to 40 degrees rotation to allow for improved functional mobility.  3) Pt to report cervical pain of <4/10 at worst to improve tolerance to ADLs and work.     Long Term Goals (6 Weeks):   1. Pt to achieve <37% limitation as measured by the FOTO to demonstrate decreased disability.  2) Pt to improve active range of motion in cervical spine to 30 degrees side bending to allow  for improved functional mobility.  3) Pt to report cervical pain of <2/10 at worst to improve tolerance to ADLs and work.  4) Pt to increase strength to at least 5/5 of muscles tested to allow for improvement in functional activities.    Plan     Plan of care Certification: 6/22/2022 to 8/26/22.     Outpatient Physical Therapy 2 times weekly for 6 weeks to include the following interventions: Electrical Stimulation  , Manual Therapy, Moist Heat/ Ice, Neuromuscular Re-ed, Patient Education, Therapeutic Activities and Therapeutic Exercise.     Renan Sampson, PT

## 2022-06-24 NOTE — PROGRESS NOTES
Physical Therapy Daily Treatment Note     Name: Oc Solis  Clinic Number: [id number]    Therapy Diagnosis:   Encounter Diagnoses   Name Primary?    Decreased range of motion of neck Yes    Weakness of both upper extremities     S/P cervical spinal fusion     Poor posture      Physician: Donna Angeles NP    Visit Date: 6/27/2022  Physician Orders: PT Eval and Treat   Medical Diagnosis from Referral: Z98.1 (ICD-10-CM) - S/P cervical spinal fusion  Evaluation Date: 6/22/2022  Authorization Period Expiration: 06/08/2023   Plan of Care Expiration: 8/26/22  Visit # / Visits authorized: 2/20    Time In: 0730 AM  Time Out: 0822 AM  Total Billable Time: 30 minutes    Precautions: Standard and C3-T1 fusion on 4/9/22  Subjective     Pt reports: he did a lot of work around the house this weekend without increasing neck pain. Patient states he mowed the lawn as well as cut shrubbery. Patient states he felt good following initial evaluation. Patient states he is feeling really stiff this morning but he doesn't have neck pain currently. He was compliant with home exercise program.  Response to previous treatment: no adverse effect  Functional change: too soon to determine     Pain: 0/10  Location: bilateral neck      Objective     Oc received therapeutic exercises to develop strength, endurance, ROM, flexibility, posture and core stabilization for  25 minutes including:  UBE x 6 minutes (alternating forward/backward)   PROM cervical rotation x 3 minutes   Supine cervical rotation AROM, x 20 (B)   Supine dowel flexion (3#) 2x10  S/L open book (to tolerance) x 10     Seated cervical rotation AROM x 20 (B)   Cervical side flexion AROM, 1x10 B  Corner pec stretch, 3x 30s   Cervical isometrics, 1x10 w/ 3s hold - flex / ext / side bend / rotation    Oc received the following manual therapy techniques: Soft tissue Mobilization were applied to the: cervical musculature for 10 minutes, including:  Cervical paraspinals,  bilateral upper traps     Oc participated in neuromuscular re-education activities to improve: Coordination, Proprioception and Posture for 15 minutes. The following activities were included:  Supine horizontal abd w/ green band, 2x10  Supine Xs w/ green band, 2x10  No moneys w/ green band, 2x10  Standing rows w/ blue band, 3x10  Standing shoulder ext w/ blue band, 2x10    Home Exercises Provided and Patient Education Provided     Education provided:   - HEP compliance    Written Home Exercises Provided: Patient instructed to cont prior HEP.  Exercises were reviewed and Oc was able to demonstrate them prior to the end of the session.  Oc demonstrated good  understanding of the education provided.     See EMR under Patient Instructions for exercises provided 06/22/2022.    Assessment     Oc demonstrates decreased active cervical rotation ROM but this did improve following manual therapy and several passive repetitions. Patient able to perform postural focused exercises without complaints of pain. Patient requires cueing to move through allowed cervical ROM rather than compensate with trunk rotation.   Oc Is progressing well towards his goals.   Pt prognosis is Good.     Pt will continue to benefit from skilled outpatient physical therapy to address the deficits listed in the problem list box on initial evaluation, provide pt/family education and to maximize pt's level of independence in the home and community environment.     Pt's spiritual, cultural and educational needs considered and pt agreeable to plan of care and goals.    Anticipated barriers to physical therapy: none    Goals:   Short Term Goals (3 Weeks):   1) Pt will demonstrate compliance with initial home exercise program as prescribed by physical therapist to improve independence with management of condition.  2) Pt to improve active range of motion in cervical spine to 40 degrees rotation to allow for improved functional  mobility.  3) Pt to report cervical pain of <4/10 at worst to improve tolerance to ADLs and work.     Long Term Goals (6 Weeks):   1. Pt to achieve <37% limitation as measured by the FOTO to demonstrate decreased disability.  2) Pt to improve active range of motion in cervical spine to 30 degrees side bending to allow for improved functional mobility.  3) Pt to report cervical pain of <2/10 at worst to improve tolerance to ADLs and work.  4) Pt to increase strength to at least 5/5 of muscles tested to allow for improvement in functional activities.    Plan     Continue current POC with emphasis on restoring AROM and postural strength.     Clarissa Fischer, PTA

## 2022-06-27 ENCOUNTER — CLINICAL SUPPORT (OUTPATIENT)
Dept: REHABILITATION | Facility: HOSPITAL | Age: 62
End: 2022-06-27
Payer: COMMERCIAL

## 2022-06-27 DIAGNOSIS — R29.898 WEAKNESS OF BOTH UPPER EXTREMITIES: ICD-10-CM

## 2022-06-27 DIAGNOSIS — R29.898 DECREASED RANGE OF MOTION OF NECK: Primary | ICD-10-CM

## 2022-06-27 DIAGNOSIS — R29.3 POOR POSTURE: ICD-10-CM

## 2022-06-27 DIAGNOSIS — Z98.1 S/P CERVICAL SPINAL FUSION: ICD-10-CM

## 2022-06-27 PROCEDURE — 97110 THERAPEUTIC EXERCISES: CPT | Mod: PO,CQ

## 2022-06-27 PROCEDURE — 97140 MANUAL THERAPY 1/> REGIONS: CPT | Mod: PO,CQ

## 2022-06-30 ENCOUNTER — CLINICAL SUPPORT (OUTPATIENT)
Dept: REHABILITATION | Facility: HOSPITAL | Age: 62
End: 2022-06-30
Payer: COMMERCIAL

## 2022-06-30 DIAGNOSIS — R29.898 WEAKNESS OF BOTH UPPER EXTREMITIES: ICD-10-CM

## 2022-06-30 DIAGNOSIS — R29.898 DECREASED RANGE OF MOTION OF NECK: Primary | ICD-10-CM

## 2022-06-30 DIAGNOSIS — Z98.1 S/P CERVICAL SPINAL FUSION: ICD-10-CM

## 2022-06-30 DIAGNOSIS — R29.3 POOR POSTURE: ICD-10-CM

## 2022-06-30 PROCEDURE — 97110 THERAPEUTIC EXERCISES: CPT | Mod: PO,CQ

## 2022-06-30 PROCEDURE — 97140 MANUAL THERAPY 1/> REGIONS: CPT | Mod: PO,CQ

## 2022-06-30 NOTE — PROGRESS NOTES
Physical Therapy Daily Treatment Note     Name: Oc Solis  Clinic Number: [id number]    Therapy Diagnosis:   Encounter Diagnoses   Name Primary?    Decreased range of motion of neck Yes    Weakness of both upper extremities     S/P cervical spinal fusion     Poor posture      Physician: Donna Angeles NP    Visit Date: 6/30/2022  Physician Orders: PT Eval and Treat   Medical Diagnosis from Referral: Z98.1 (ICD-10-CM) - S/P cervical spinal fusion  Evaluation Date: 6/22/2022  Authorization Period Expiration: 06/08/2023   Plan of Care Expiration: 8/26/22  Visit # / Visits authorized: 3/20    Time In: 0802 AM  Time Out: 0852 AM  Total Billable Time: 30 minutes    Precautions: Standard and C3-T1 fusion on 4/9/22  Subjective     Pt reports: he was a sore following last treatment session but he felt this was muscular. Patient states he was able to go home after last session to vacuum and mop without pain. Patient states he push mowed the lawn yesterday without increased pain. Patient states his main complaint is stiffness. He was compliant with home exercise program.  Response to previous treatment: no adverse effect  Functional change: too soon to determine     Pain: 0/10  Location: bilateral neck      Objective     Oc received therapeutic exercises to develop strength, endurance, ROM, flexibility, posture and core stabilization for  25 minutes including:  UBE x 6 minutes (alternating forward/backward)   PROM cervical rotation x 3 minutes   Supine cervical rotation AROM x 20 (B)   Supine dowel flexion (3#) 2x10  S/L open book (to tolerance) x 10     Seated cervical rotation AROM x 20 (B)   Cervical side flexion AROM, 1x10 B  Corner pec stretch, 3x 30s   Cervical isometrics, 1x10 w/ 3s hold - flex / ext / side bend / rotation    Oc received the following manual therapy techniques: Soft tissue Mobilization were applied to the: cervical musculature for 10 minutes, including:  Cervical paraspinals, bilateral  upper traps     Oc participated in neuromuscular re-education activities to improve: Coordination, Proprioception and Posture for 15 minutes. The following activities were included:  Supine horizontal abd w/ green band, 2x10  Supine X's w/ green band, 2x10  No moneys w/ green band, 2x10  Standing rows w/ blue band, 3x10  Standing shoulder ext w/ blue band, 2x10    Home Exercises Provided and Patient Education Provided     Education provided:   - HEP compliance    Written Home Exercises Provided: Patient instructed to cont prior HEP.  Exercises were reviewed and Oc was able to demonstrate them prior to the end of the session.  Oc demonstrated good  understanding of the education provided.     See EMR under Patient Instructions for exercises provided 06/22/2022.    Assessment     Oc able to perform all exercises without complaints of neck pain. Patient does achieve improvement in cervical rotation ROM following manual therapy and passive/active repetition. Global ROM limitation is expected due to multi-level fusion. Decreased tissue pliability through (L) upper trap this date but improvements achieved with manual therapy techniques.     Oc Is progressing well towards his goals.   Pt prognosis is Good.     Pt will continue to benefit from skilled outpatient physical therapy to address the deficits listed in the problem list box on initial evaluation, provide pt/family education and to maximize pt's level of independence in the home and community environment.     Pt's spiritual, cultural and educational needs considered and pt agreeable to plan of care and goals.    Anticipated barriers to physical therapy: none    Goals:   Short Term Goals (3 Weeks):   1) Pt will demonstrate compliance with initial home exercise program as prescribed by physical therapist to improve independence with management of condition.  2) Pt to improve active range of motion in cervical spine to 40 degrees rotation to allow  for improved functional mobility.  3) Pt to report cervical pain of <4/10 at worst to improve tolerance to ADLs and work.     Long Term Goals (6 Weeks):   1. Pt to achieve <37% limitation as measured by the FOTO to demonstrate decreased disability.  2) Pt to improve active range of motion in cervical spine to 30 degrees side bending to allow for improved functional mobility.  3) Pt to report cervical pain of <2/10 at worst to improve tolerance to ADLs and work.  4) Pt to increase strength to at least 5/5 of muscles tested to allow for improvement in functional activities.    Plan     Continue current POC with emphasis on restoring AROM and postural strength.     Clarissa Fischer, PTA

## 2022-07-05 ENCOUNTER — PATIENT MESSAGE (OUTPATIENT)
Dept: NEUROSURGERY | Facility: CLINIC | Age: 62
End: 2022-07-05
Payer: COMMERCIAL

## 2022-07-06 ENCOUNTER — CLINICAL SUPPORT (OUTPATIENT)
Dept: REHABILITATION | Facility: HOSPITAL | Age: 62
End: 2022-07-06
Payer: COMMERCIAL

## 2022-07-06 DIAGNOSIS — R29.898 DECREASED RANGE OF MOTION OF NECK: Primary | ICD-10-CM

## 2022-07-06 DIAGNOSIS — R29.3 POOR POSTURE: ICD-10-CM

## 2022-07-06 DIAGNOSIS — R29.898 WEAKNESS OF BOTH UPPER EXTREMITIES: ICD-10-CM

## 2022-07-06 DIAGNOSIS — Z98.1 S/P CERVICAL SPINAL FUSION: ICD-10-CM

## 2022-07-06 PROCEDURE — 97110 THERAPEUTIC EXERCISES: CPT | Mod: PO,CQ

## 2022-07-06 PROCEDURE — 97140 MANUAL THERAPY 1/> REGIONS: CPT | Mod: PO,CQ

## 2022-07-06 PROCEDURE — 97112 NEUROMUSCULAR REEDUCATION: CPT | Mod: PO,CQ

## 2022-07-06 NOTE — PROGRESS NOTES
Physical Therapy Daily Treatment Note     Name: Oc Solis  Clinic Number: [id number]    Therapy Diagnosis:   Encounter Diagnoses   Name Primary?    Decreased range of motion of neck Yes    Weakness of both upper extremities     S/P cervical spinal fusion     Poor posture      Physician: Donna Angeles NP    Visit Date: 7/6/2022  Physician Orders: PT Eval and Treat   Medical Diagnosis from Referral: Z98.1 (ICD-10-CM) - S/P cervical spinal fusion  Evaluation Date: 6/22/2022  Authorization Period Expiration: 06/08/2023   Plan of Care Expiration: 8/26/22  Visit # / Visits authorized: 4/20    Time In: 0830 AM  Time Out: 0918 AM  Total Billable Time: 48 minutes    Precautions: Standard and C3-T1 fusion on 4/9/22  Subjective     Pt reports: his neck is doing well this morning but his (R) shoulder is sore this morning because he mowed the lawn and painted the fence. Patient states he sent a message to his MD to get a release for return to work as a . He was compliant with home exercise program.  Response to previous treatment: no adverse effect  Functional change: too soon to determine     Pain: 0/10  Location: bilateral neck      Objective     Oc received therapeutic exercises to develop strength, endurance, ROM, flexibility, posture and core stabilization for  25 minutes including:  UBE x 6 minutes (alternating forward/backward)   PROM cervical rotation x 3 minutes   Supine cervical rotation AROM x 20 (B)   Supine dowel flexion (3#) 2x10  S/L open book (to tolerance) x 10     Seated cervical rotation AROM x 20 (B)   Cervical side flexion AROM, 1x10 B  Corner pec stretch, 3x 30s   Cervical isometrics, 1x10 w/ 3s hold - flex / ext / side bend / rotation  IR/ER with red TB 2x10    Oc received the following manual therapy techniques: Soft tissue Mobilization were applied to the: cervical musculature for 10 minutes, including:  Cervical paraspinals, bilateral upper traps     Oc participated in  neuromuscular re-education activities to improve: Coordination, Proprioception and Posture for 15 minutes. The following activities were included:  Supine horizontal abd w/ green band, 2x10  Supine X's w/ green band, 2x10  No moneys w/ green band, 2x10  Standing rows w/ blue band, 3x10  Standing shoulder ext w/ blue band, 2x10    Home Exercises Provided and Patient Education Provided     Education provided:   - HEP compliance    Written Home Exercises Provided: Patient instructed to cont prior HEP.  Exercises were reviewed and Oc was able to demonstrate them prior to the end of the session.  Oc demonstrated good  understanding of the education provided.     See EMR under Patient Instructions for exercises provided 06/22/2022.    Assessment     Oc initially limited by (R) shoulder discomfort today but this improved with ROM and postural focused exercises. Global cervical ROM limitation is present due to multi-level fusion but patient feels he is ready to return to work as . No complaints of neck pain during treatment session.     Oc Is progressing well towards his goals.   Pt prognosis is Good.     Pt will continue to benefit from skilled outpatient physical therapy to address the deficits listed in the problem list box on initial evaluation, provide pt/family education and to maximize pt's level of independence in the home and community environment.     Pt's spiritual, cultural and educational needs considered and pt agreeable to plan of care and goals.    Anticipated barriers to physical therapy: none    Goals:   Short Term Goals (3 Weeks):   1) Pt will demonstrate compliance with initial home exercise program as prescribed by physical therapist to improve independence with management of condition.  2) Pt to improve active range of motion in cervical spine to 40 degrees rotation to allow for improved functional mobility.  3) Pt to report cervical pain of <4/10 at worst to improve tolerance to  ADLs and work.     Long Term Goals (6 Weeks):   1. Pt to achieve <37% limitation as measured by the FOTO to demonstrate decreased disability.  2) Pt to improve active range of motion in cervical spine to 30 degrees side bending to allow for improved functional mobility.  3) Pt to report cervical pain of <2/10 at worst to improve tolerance to ADLs and work.  4) Pt to increase strength to at least 5/5 of muscles tested to allow for improvement in functional activities.    Plan     Continue current POC with emphasis on restoring AROM and postural strength.     Clarissa Fischer, PTA

## 2022-07-08 ENCOUNTER — CLINICAL SUPPORT (OUTPATIENT)
Dept: REHABILITATION | Facility: HOSPITAL | Age: 62
End: 2022-07-08
Payer: COMMERCIAL

## 2022-07-08 DIAGNOSIS — R29.3 POOR POSTURE: ICD-10-CM

## 2022-07-08 DIAGNOSIS — R29.898 WEAKNESS OF BOTH UPPER EXTREMITIES: ICD-10-CM

## 2022-07-08 DIAGNOSIS — Z98.1 S/P CERVICAL SPINAL FUSION: ICD-10-CM

## 2022-07-08 DIAGNOSIS — R29.898 DECREASED RANGE OF MOTION OF NECK: Primary | ICD-10-CM

## 2022-07-08 PROCEDURE — 97112 NEUROMUSCULAR REEDUCATION: CPT | Mod: PO,CQ

## 2022-07-08 PROCEDURE — 97110 THERAPEUTIC EXERCISES: CPT | Mod: PO,CQ

## 2022-07-08 NOTE — PROGRESS NOTES
Physical Therapy Daily Treatment Note     Name: Oc Solis  Clinic Number: [id number]    Therapy Diagnosis:   Encounter Diagnoses   Name Primary?    Decreased range of motion of neck Yes    Weakness of both upper extremities     S/P cervical spinal fusion     Poor posture      Physician: Donna Angeles NP    Visit Date: 7/8/2022  Physician Orders: PT Eval and Treat   Medical Diagnosis from Referral: Z98.1 (ICD-10-CM) - S/P cervical spinal fusion  Evaluation Date: 6/22/2022  Authorization Period Expiration: 06/08/2023   Plan of Care Expiration: 8/26/22  Visit # / Visits authorized: 5/20    Time In: 0900 AM  Time Out: 0950 AM  Total Billable Time: 30 minutes    Precautions: Standard and C3-T1 fusion on 4/9/22  Subjective     Pt reports: his neck is feeling stiff but he isn't painful. 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 He was compliant with home exercise program.  Response to previous treatment: no adverse effect  Functional change: too soon to determine     Pain: 0/10  Location: bilateral neck      Objective     Oc received therapeutic exercises to develop strength, endurance, ROM, flexibility, posture and core stabilization for  25 minutes including:  UBE x 6 minutes (alternating forward/backward)   PROM cervical rotation x 3 minutes   Supine cervical rotation AROM x 20 (B)   Supine dowel flexion (3#) 2x10  S/L open book (to tolerance) x 10     Seated cervical rotation AROM x 20 (B)   Corner pec stretch, 3x 30s   Towel slides (BUE) x 10     Oc received the following manual therapy techniques: Soft tissue Mobilization were applied to the: cervical musculature for 10 minutes, including:  Cervical paraspinals, bilateral upper traps     Oc participated in neuromuscular re-education activities to improve: Coordination, Proprioception and Posture for 15 minutes. The following activities were included:  Supine horizontal abd w/ blue band,  2x10  Supine X's w/ blue band, 2x10  No moneys w/ blue band, 2x10  Standing rows w/ blue band, 3x10  Standing shoulder ext w/ blue band, 3x10  Standing IR/ER with red TB 2x10 (B)     Home Exercises Provided and Patient Education Provided     Education provided:   - HEP compliance    Written Home Exercises Provided: Patient instructed to cont prior HEP.  Exercises were reviewed and Oc was able to demonstrate them prior to the end of the session.  Oc demonstrated good  understanding of the education provided.     See EMR under Patient Instructions for exercises provided 06/22/2022.    Assessment     Oc able to progress resistance of theraband exercises without provocation of neck/shoulder pain. Most ROM limitation noted into cervical extension as cervical rotation does improve with repetition. Initiated towel slides today to promote extension through thoracic spine; therapeutic effect very easily achieved. Patient plans to return to work as a  as soon an next week.   Oc Is progressing well towards his goals.   Pt prognosis is Good.     Pt will continue to benefit from skilled outpatient physical therapy to address the deficits listed in the problem list box on initial evaluation, provide pt/family education and to maximize pt's level of independence in the home and community environment.     Pt's spiritual, cultural and educational needs considered and pt agreeable to plan of care and goals.    Anticipated barriers to physical therapy: none    Goals:   Short Term Goals (3 Weeks):   1) Pt will demonstrate compliance with initial home exercise program as prescribed by physical therapist to improve independence with management of condition.  2) Pt to improve active range of motion in cervical spine to 40 degrees rotation to allow for improved functional mobility.  3) Pt to report cervical pain of <4/10 at worst to improve tolerance to ADLs and work.     Long Term Goals (6 Weeks):   1. Pt to achieve  <37% limitation as measured by the FOTO to demonstrate decreased disability.  2) Pt to improve active range of motion in cervical spine to 30 degrees side bending to allow for improved functional mobility.  3) Pt to report cervical pain of <2/10 at worst to improve tolerance to ADLs and work.  4) Pt to increase strength to at least 5/5 of muscles tested to allow for improvement in functional activities.    Plan     Continue current POC with emphasis on restoring AROM and postural strength.     Clarissa Fischer, PTA

## 2022-07-11 ENCOUNTER — CLINICAL SUPPORT (OUTPATIENT)
Dept: REHABILITATION | Facility: HOSPITAL | Age: 62
End: 2022-07-11
Payer: COMMERCIAL

## 2022-07-11 DIAGNOSIS — R29.3 POOR POSTURE: ICD-10-CM

## 2022-07-11 DIAGNOSIS — R29.898 WEAKNESS OF BOTH UPPER EXTREMITIES: ICD-10-CM

## 2022-07-11 DIAGNOSIS — Z98.1 S/P CERVICAL SPINAL FUSION: ICD-10-CM

## 2022-07-11 DIAGNOSIS — R29.898 DECREASED RANGE OF MOTION OF NECK: Primary | ICD-10-CM

## 2022-07-11 PROCEDURE — 97110 THERAPEUTIC EXERCISES: CPT | Mod: PO

## 2022-07-11 PROCEDURE — 97140 MANUAL THERAPY 1/> REGIONS: CPT | Mod: PO

## 2022-07-11 PROCEDURE — 97112 NEUROMUSCULAR REEDUCATION: CPT | Mod: PO

## 2022-07-11 NOTE — PROGRESS NOTES
Physical Therapy Daily Treatment Note     Name: Oc Solis  Clinic Number: [id number]    Therapy Diagnosis:   Encounter Diagnoses   Name Primary?    Decreased range of motion of neck Yes    Weakness of both upper extremities     S/P cervical spinal fusion     Poor posture      Physician: Donna Angeles NP    Visit Date: 7/11/2022  Physician Orders: PT Eval and Treat   Medical Diagnosis from Referral: Z98.1 (ICD-10-CM) - S/P cervical spinal fusion  Evaluation Date: 6/22/2022  Authorization Period Expiration: 12/31/2022   Plan of Care Expiration: 8/26/22  Visit # / Visits authorized: 6 / 20    Time In: 7:03  Time Out: 8:00  Total Billable Time: 25 minutes    Precautions: Standard and C3-T1 fusion on 4/9/22  Subjective     Pt reports: still having stiffness in his neck, but no pain. A little sore from mowing his grass. Will return to work tomorrow with restrictions.   He was compliant with home exercise program.  Response to previous treatment: no adverse effect  Functional change: too soon to determine     Pain: 0/10  Location: bilateral neck      Objective     Oc received therapeutic exercises to develop strength, endurance, ROM, flexibility, posture and core stabilization for  25 minutes including:  UBE x 6 minutes lvl 1.3 (alternating forward/backward)   PROM cervical rotation x 3 minutes   Supine cervical rotation AROM x 20 (B)   Supine dowel flexion (5#) 2x10  S/L open book (to tolerance) x 10     Seated cervical rotation AROM x 20 (B)   Corner pec stretch, 3x 30s   Towel slides (BUE) x 10     Oc received the following manual therapy techniques: Soft tissue Mobilization were applied to the: cervical musculature for 15 minutes, including:  Cervical paraspinals, bilateral upper traps     Oc participated in neuromuscular re-education activities to improve: Coordination, Proprioception and Posture for 15 minutes. The following activities were included:  Supine horizontal abd w/ blue band,  2x10  Supine X's w/ blue band, 2x10  No moneys w/ blue band, 3x10  Standing rows w/ blue band, 3x10  Standing shoulder ext w/ blue band, 3x10  Standing IR/ER with green TB 2x10 (B)     Home Exercises Provided and Patient Education Provided     Education provided:   - HEP compliance    Written Home Exercises Provided: Patient instructed to cont prior HEP.  Exercises were reviewed and Oc was able to demonstrate them prior to the end of the session.  Oc demonstrated good  understanding of the education provided.     See EMR under Patient Instructions for exercises provided 06/22/2022.    Assessment     Pt tolerated tx well, no exacerbation of pain, only appropriate stretching and muscle fatigue achieved. More PROM available than achieved actively. Patient continues to require cues for postural awareness.   Oc Is progressing well towards his goals.   Pt prognosis is Good.     Pt will continue to benefit from skilled outpatient physical therapy to address the deficits listed in the problem list box on initial evaluation, provide pt/family education and to maximize pt's level of independence in the home and community environment.     Pt's spiritual, cultural and educational needs considered and pt agreeable to plan of care and goals.    Anticipated barriers to physical therapy: none    Goals:   Short Term Goals (3 Weeks):   1) Pt will demonstrate compliance with initial home exercise program as prescribed by physical therapist to improve independence with management of condition.  2) Pt to improve active range of motion in cervical spine to 40 degrees rotation to allow for improved functional mobility.  3) Pt to report cervical pain of <4/10 at worst to improve tolerance to ADLs and work.     Long Term Goals (6 Weeks):   1. Pt to achieve <37% limitation as measured by the FOTO to demonstrate decreased disability.  2) Pt to improve active range of motion in cervical spine to 30 degrees side bending to allow for  improved functional mobility.  3) Pt to report cervical pain of <2/10 at worst to improve tolerance to ADLs and work.  4) Pt to increase strength to at least 5/5 of muscles tested to allow for improvement in functional activities.    Plan     Continue current POC with emphasis on restoring AROM and postural strength.     Renan Sampson, PT

## 2022-07-18 ENCOUNTER — CLINICAL SUPPORT (OUTPATIENT)
Dept: REHABILITATION | Facility: HOSPITAL | Age: 62
End: 2022-07-18
Payer: COMMERCIAL

## 2022-07-18 DIAGNOSIS — R29.898 DECREASED RANGE OF MOTION OF NECK: Primary | ICD-10-CM

## 2022-07-18 DIAGNOSIS — R29.3 POOR POSTURE: ICD-10-CM

## 2022-07-18 DIAGNOSIS — R29.898 WEAKNESS OF BOTH UPPER EXTREMITIES: ICD-10-CM

## 2022-07-18 DIAGNOSIS — Z98.1 S/P CERVICAL SPINAL FUSION: ICD-10-CM

## 2022-07-18 PROCEDURE — 97140 MANUAL THERAPY 1/> REGIONS: CPT | Mod: PO,CQ

## 2022-07-18 PROCEDURE — 97110 THERAPEUTIC EXERCISES: CPT | Mod: PO,CQ

## 2022-07-18 PROCEDURE — 97112 NEUROMUSCULAR REEDUCATION: CPT | Mod: PO,CQ

## 2022-07-18 NOTE — PROGRESS NOTES
Physical Therapy Daily Treatment Note     Name: Oc Solis  Clinic Number: [id number]    Therapy Diagnosis:   Encounter Diagnoses   Name Primary?    Decreased range of motion of neck Yes    Weakness of both upper extremities     S/P cervical spinal fusion     Poor posture      Physician: Donna Angeles NP    Visit Date: 7/18/2022  Physician Orders: PT Eval and Treat   Medical Diagnosis from Referral: Z98.1 (ICD-10-CM) - S/P cervical spinal fusion  Evaluation Date: 6/22/2022  Authorization Period Expiration: 12/31/2022   Plan of Care Expiration: 8/26/22  Visit # / Visits authorized: 7 / 20    Time In: 728 AM  Time Out: 0825 AM  Total Billable Time: 25 minutes    Precautions: Standard and C3-T1 fusion on 4/9/22  Subjective     Pt reports: his neck is stiff today but not painful. Patient states he will be going home to mow the grass later this afternoon. Oc states he has not been compliant with HEP the last several days.   Response to previous treatment: no adverse effect  Functional change: too soon to determine     Pain: 0/10  Location: bilateral neck      Objective     Oc received therapeutic exercises to develop strength, endurance, ROM, flexibility, posture and core stabilization for  25 minutes including:  UBE x 6 minutes lvl 1.3 (alternating forward/backward)   PROM cervical rotation x 3 minutes   Supine cervical rotation AROM x 20 (B)   Supine dowel flexion (5#) 2x10  S/L open book (to tolerance) x 10     Seated cervical rotation AROM x 20 (B)   Corner pec stretch, 3x 30s   Towel slides (BUE) x 10, 5 sec hold     Oc received the following manual therapy techniques: Soft tissue Mobilization were applied to the: cervical musculature for 15 minutes, including:  Cervical paraspinals, bilateral upper traps     Oc participated in neuromuscular re-education activities to improve: Coordination, Proprioception and Posture for 15 minutes. The following activities were included:  Supine horizontal  abd w/ blue band, 2x10  Supine X's w/ blue band, 2x10  No moneys w/ blue band, 3x10  Standing rows w/ blue band, 3x10  Standing shoulder ext w/ blue band, 3x10  Standing IR/ER with green TB 2x10 (B)     Home Exercises Provided and Patient Education Provided     Education provided:   - HEP compliance    Written Home Exercises Provided: Patient instructed to cont prior HEP.  Exercises were reviewed and Oc was able to demonstrate them prior to the end of the session.  cO demonstrated good  understanding of the education provided.     See EMR under Patient Instructions for exercises provided 06/22/2022.    Assessment     Oc demonstrates decreased cervical rotation ROM due to non-compliance with HEP over the last several days. ROM did improve with manual therapy and passive repetition. Encouraged patient to be aware of posture when driving as he is now spending more time driving since returning to work. No complaints of pain with postural and rotator cuff focused exercises.     Oc Is progressing well towards his goals.   Pt prognosis is Good.     Pt will continue to benefit from skilled outpatient physical therapy to address the deficits listed in the problem list box on initial evaluation, provide pt/family education and to maximize pt's level of independence in the home and community environment.     Pt's spiritual, cultural and educational needs considered and pt agreeable to plan of care and goals.    Anticipated barriers to physical therapy: none    Goals:   Short Term Goals (3 Weeks):   1) Pt will demonstrate compliance with initial home exercise program as prescribed by physical therapist to improve independence with management of condition.  2) Pt to improve active range of motion in cervical spine to 40 degrees rotation to allow for improved functional mobility.  3) Pt to report cervical pain of <4/10 at worst to improve tolerance to ADLs and work.     Long Term Goals (6 Weeks):   1. Pt to  achieve <37% limitation as measured by the FOTO to demonstrate decreased disability.  2) Pt to improve active range of motion in cervical spine to 30 degrees side bending to allow for improved functional mobility.  3) Pt to report cervical pain of <2/10 at worst to improve tolerance to ADLs and work.  4) Pt to increase strength to at least 5/5 of muscles tested to allow for improvement in functional activities.    Plan     Continue current POC with emphasis on restoring AROM and postural strength.     Clarissa Fischer, PTA

## 2022-07-19 ENCOUNTER — OFFICE VISIT (OUTPATIENT)
Dept: PHYSICAL MEDICINE AND REHAB | Facility: CLINIC | Age: 62
End: 2022-07-19
Payer: COMMERCIAL

## 2022-07-19 VITALS — BODY MASS INDEX: 38.8 KG/M2 | HEIGHT: 68 IN | RESPIRATION RATE: 16 BRPM | WEIGHT: 256 LBS

## 2022-07-19 DIAGNOSIS — R20.2 PARESTHESIAS: ICD-10-CM

## 2022-07-19 PROCEDURE — 4010F PR ACE/ARB THEARPY RXD/TAKEN: ICD-10-PCS | Mod: CPTII,S$GLB,, | Performed by: PHYSICAL MEDICINE & REHABILITATION

## 2022-07-19 PROCEDURE — 3066F PR DOCUMENTATION OF TREATMENT FOR NEPHROPATHY: ICD-10-PCS | Mod: CPTII,S$GLB,, | Performed by: PHYSICAL MEDICINE & REHABILITATION

## 2022-07-19 PROCEDURE — 99999 PR PBB SHADOW E&M-EST. PATIENT-LVL III: CPT | Mod: PBBFAC,,, | Performed by: PHYSICAL MEDICINE & REHABILITATION

## 2022-07-19 PROCEDURE — 3008F PR BODY MASS INDEX (BMI) DOCUMENTED: ICD-10-PCS | Mod: CPTII,S$GLB,, | Performed by: PHYSICAL MEDICINE & REHABILITATION

## 2022-07-19 PROCEDURE — 99499 NO LOS: ICD-10-PCS | Mod: S$GLB,,, | Performed by: PHYSICAL MEDICINE & REHABILITATION

## 2022-07-19 PROCEDURE — 3044F HG A1C LEVEL LT 7.0%: CPT | Mod: CPTII,S$GLB,, | Performed by: PHYSICAL MEDICINE & REHABILITATION

## 2022-07-19 PROCEDURE — 3061F NEG MICROALBUMINURIA REV: CPT | Mod: CPTII,S$GLB,, | Performed by: PHYSICAL MEDICINE & REHABILITATION

## 2022-07-19 PROCEDURE — 95909 PR NERVE CONDUCTION STUDY; 5-6 STUDIES: ICD-10-PCS | Mod: S$GLB,,, | Performed by: PHYSICAL MEDICINE & REHABILITATION

## 2022-07-19 PROCEDURE — 1159F MED LIST DOCD IN RCRD: CPT | Mod: CPTII,S$GLB,, | Performed by: PHYSICAL MEDICINE & REHABILITATION

## 2022-07-19 PROCEDURE — 99999 PR PBB SHADOW E&M-EST. PATIENT-LVL III: ICD-10-PCS | Mod: PBBFAC,,, | Performed by: PHYSICAL MEDICINE & REHABILITATION

## 2022-07-19 PROCEDURE — 1159F PR MEDICATION LIST DOCUMENTED IN MEDICAL RECORD: ICD-10-PCS | Mod: CPTII,S$GLB,, | Performed by: PHYSICAL MEDICINE & REHABILITATION

## 2022-07-19 PROCEDURE — 95886 PR EMG COMPLETE, W/ NERVE CONDUCTION STUDIES, 5+ MUSCLES: ICD-10-PCS | Mod: S$GLB,,, | Performed by: PHYSICAL MEDICINE & REHABILITATION

## 2022-07-19 PROCEDURE — 3061F PR NEG MICROALBUMINURIA RESULT DOCUMENTED/REVIEW: ICD-10-PCS | Mod: CPTII,S$GLB,, | Performed by: PHYSICAL MEDICINE & REHABILITATION

## 2022-07-19 PROCEDURE — 3066F NEPHROPATHY DOC TX: CPT | Mod: CPTII,S$GLB,, | Performed by: PHYSICAL MEDICINE & REHABILITATION

## 2022-07-19 PROCEDURE — 3044F PR MOST RECENT HEMOGLOBIN A1C LEVEL <7.0%: ICD-10-PCS | Mod: CPTII,S$GLB,, | Performed by: PHYSICAL MEDICINE & REHABILITATION

## 2022-07-19 PROCEDURE — 1160F RVW MEDS BY RX/DR IN RCRD: CPT | Mod: CPTII,S$GLB,, | Performed by: PHYSICAL MEDICINE & REHABILITATION

## 2022-07-19 PROCEDURE — 3008F BODY MASS INDEX DOCD: CPT | Mod: CPTII,S$GLB,, | Performed by: PHYSICAL MEDICINE & REHABILITATION

## 2022-07-19 PROCEDURE — 1160F PR REVIEW ALL MEDS BY PRESCRIBER/CLIN PHARMACIST DOCUMENTED: ICD-10-PCS | Mod: CPTII,S$GLB,, | Performed by: PHYSICAL MEDICINE & REHABILITATION

## 2022-07-19 PROCEDURE — 95909 NRV CNDJ TST 5-6 STUDIES: CPT | Mod: S$GLB,,, | Performed by: PHYSICAL MEDICINE & REHABILITATION

## 2022-07-19 PROCEDURE — 95886 MUSC TEST DONE W/N TEST COMP: CPT | Mod: S$GLB,,, | Performed by: PHYSICAL MEDICINE & REHABILITATION

## 2022-07-19 PROCEDURE — 99499 UNLISTED E&M SERVICE: CPT | Mod: S$GLB,,, | Performed by: PHYSICAL MEDICINE & REHABILITATION

## 2022-07-19 PROCEDURE — 4010F ACE/ARB THERAPY RXD/TAKEN: CPT | Mod: CPTII,S$GLB,, | Performed by: PHYSICAL MEDICINE & REHABILITATION

## 2022-07-19 NOTE — PROGRESS NOTES
[facility]  [address] [facility] [place] [address]             Full Name: Oc Solis Gender: Male  Patient ID: [id number] YOB: 1960  History: Pt reports bilateral (L=R) anterolateral thigh paresthesias, as well as bilateral foot tightness. No DM.       Visit Date: 7/19/2022 10:40 AM  Age: 62 Years  Examining Physician: Mitch Renteria MD  Referring Physician: ANABEL Angeles      Sensory NCS      Nerve / Sites Rec. Site Onset Lat Peak Lat NP Amp PP Amp Segments Distance Velocity     ms ms µV µV  cm m/s   L Sural - Ankle (Calf)      Calf Ankle 3.27 3.77 18.3 12.0 Calf - Ankle 14 43      2 Ankle 3.38 3.90 12.9 7.3          Motor NCS      Nerve / Sites Muscle Latency Amplitude Amp % Duration Segments Distance Lat Diff Velocity     ms mV % ms  cm ms m/s   R Peroneal - EDB      Ankle EDB 4.17 4.2 100 5.10 Ankle - EDB 8        Fib head EDB 10.69 3.9 93.7 5.48 Fib head - Ankle 27 6.52 41   L Peroneal - EDB      Ankle EDB 5.81 1.0 100 4.79 Ankle - EDB 8        Fib head EDB 11.38 1.2 118 6.29 Fib head - Ankle 26 5.56 47   R Tibial - AH      Ankle AH 4.33 14.9 100 5.77 Ankle - AH 8        Pop fossa AH 12.90 4.5 30.4 6.44 Pop fossa - Ankle 34 8.56 40   L Tibial - AH      Ankle AH 4.02 11.5 100 6.50 Ankle - AH 8        Pop fossa AH 12.85 7.1 61.3 5.98 Pop fossa - Ankle 37 8.83 42       EMG Summary Table     Spontaneous MUAP Recruitment   Muscle IA Fib PSW Fasc CRD Amp Dur. PPP Pattern   L. Vastus medialis N None None None None N N N N   L. Tibialis anterior N None None None None N N N N   L. Peroneus longus N None None None None N N N N   L. Gastrocnemius (Medial head) N None None None None N N N N   L. Gluteus medius N None None None None N N N N   L. Gluteus aki N None None None None N N N N   L. Lumbar paraspinals N None None None None       R. Vastus medialis N None None None None N N N N       Summary    The motor conduction test was performed on 4 nerve(s). The results were normal in 3 nerve(s):  R Peroneal - EDB, R Tibial - AH, L Tibial - AH. Results outside the specified normal range were found in 1 nerve(s), as follows:   In the L Peroneal - EDB study  o the peak amplitude result was reduced for Ankle stimulation    The sensory conduction test was performed on 2 nerve(s). The results were normal in 1 nerve(s): L Sural - Ankle (Calf). Findings were unremarkable in 1 nerve(s): R Sural - Ankle (Calf). There were no results outside the specified normal range.    The needle EMG study was normal in all 8 tested muscles: L. Vastus medialis, L. Tibialis anterior, L. Peroneus longus, L. Gastrocnemius (Medial head), L. Gluteus medius, L. Gluteus aki, L. Lumbar paraspinals, R. Vastus medialis.      Electrodiagnostic Impression:  1. Findings on today's electrodiagnostic testing were normal except for reduction in amplitude of the left peroneal motor nerve conduction response, and an absent response from the right sural sensory nerve conduction study.  These findings, in isolation, are somewhat nonspecific.  Considering the patient's symptoms are symmetric in the lower extremities and do not correlate with peroneal or sural neuropathies in isolation, there is high probability that the abnormal findings are due to technical error related to his body habitus (adipose layer surrounding the distal nerves).  2. There is insufficient electrodiagnostic evidence for diagnoses of peripheral polyneuropathy, myopathy, or active axonal lumbosacral radiculopathy/plexopathy of the left lower extremity.      Plan:  As stated above, today's slight abnormal findings are nondiagnostic, with a high probability of technical error in recording due to his body habitus.  His most prominent symptoms over his anterolateral thighs do raise some question for meralgia paresthetica.  Nerve conduction studies of the bilateral lateral femoral cutaneous nerves are especially difficult to test for.  Additionally, considering his symptoms are  bilateral and symmetric, there would be no normal control to use for comparison, even if a response from the lateral femoral cutaneous nerve was able to be obtained.  Consideration could be had for diagnostic and potentially therapeutic perineural injection of the lateral femoral cutaneous nerve on both sides if clinical concern exists for meralgia paresthetica.  Today's test results will be sent to his treating providers for further review and direction in his treatment.    Thank you very much for the referral. Please call if you have any questions regarding this study or the report.       ___________________________  Mitch Renteria M.D.

## 2022-07-20 ENCOUNTER — PATIENT MESSAGE (OUTPATIENT)
Dept: NEUROSURGERY | Facility: CLINIC | Age: 62
End: 2022-07-20
Payer: COMMERCIAL

## 2022-07-20 DIAGNOSIS — R20.0 LOWER EXTREMITY NUMBNESS: ICD-10-CM

## 2022-07-20 DIAGNOSIS — M54.16 LUMBAR RADICULOPATHY, CHRONIC: ICD-10-CM

## 2022-07-20 DIAGNOSIS — R20.2 PARESTHESIAS: Primary | ICD-10-CM

## 2022-07-21 ENCOUNTER — CLINICAL SUPPORT (OUTPATIENT)
Dept: REHABILITATION | Facility: HOSPITAL | Age: 62
End: 2022-07-21
Payer: COMMERCIAL

## 2022-07-21 DIAGNOSIS — R29.898 WEAKNESS OF BOTH UPPER EXTREMITIES: ICD-10-CM

## 2022-07-21 DIAGNOSIS — R29.898 DECREASED RANGE OF MOTION OF NECK: Primary | ICD-10-CM

## 2022-07-21 DIAGNOSIS — Z98.1 S/P CERVICAL SPINAL FUSION: ICD-10-CM

## 2022-07-21 DIAGNOSIS — R29.3 POOR POSTURE: ICD-10-CM

## 2022-07-21 PROCEDURE — 97110 THERAPEUTIC EXERCISES: CPT | Mod: PO

## 2022-07-21 PROCEDURE — 97140 MANUAL THERAPY 1/> REGIONS: CPT | Mod: PO

## 2022-07-21 PROCEDURE — 97112 NEUROMUSCULAR REEDUCATION: CPT | Mod: PO

## 2022-07-21 RX ORDER — HYDROCODONE BITARTRATE AND ACETAMINOPHEN 10; 325 MG/1; MG/1
1 TABLET ORAL EVERY 8 HOURS PRN
Qty: 21 TABLET | Refills: 0 | OUTPATIENT
Start: 2022-07-21

## 2022-07-21 RX ORDER — TIZANIDINE 4 MG/1
4 TABLET ORAL EVERY 8 HOURS PRN
Qty: 30 TABLET | Refills: 0 | Status: SHIPPED | OUTPATIENT
Start: 2022-07-21 | End: 2022-08-21 | Stop reason: SDUPTHER

## 2022-07-21 NOTE — PROGRESS NOTES
Physical Therapy Daily Treatment Note     Name: Oc Solis  Clinic Number: [id number]    Therapy Diagnosis:   Encounter Diagnoses   Name Primary?    Decreased range of motion of neck Yes    Weakness of both upper extremities     S/P cervical spinal fusion     Poor posture      Physician: Donna Angeles NP    Visit Date: 7/21/2022  Physician Orders: PT Eval and Treat   Medical Diagnosis from Referral: Z98.1 (ICD-10-CM) - S/P cervical spinal fusion  Evaluation Date: 6/22/2022  Authorization Period Expiration: 12/31/2022   Plan of Care Expiration: 8/26/22  Visit # / Visits authorized: 8 / 20    Time In: 10:00  Time Out: 10:45  Total Billable Time: 40 minutes    Precautions: Standard and C3-T1 fusion on 4/9/22  Subjective     Pt reports:  Pt is doing well, not really having any pain in the neck. Reports he only has pain after he mows the grass. He does still feel he has a lot of tightness in the neck though, reports he is doing some stretches at home. He has been lifting and being more active at work without issue. Reports he has started wearing his soft collar to sleep again, feels more comfortable and sleeps better with it on. Reports he feels he has made good progress and is ready for discharge next week.   Response to previous treatment: no adverse effect  Functional change: too soon to determine     Pain: 0/10  Location: bilateral neck      Objective     Oc received therapeutic exercises to develop strength, endurance, ROM, flexibility, posture and core stabilization for 15 minutes including:  UBE x 6 minutes lvl 1.3 (alternating forward/backward)   PROM cervical rotation x 3 minutes   Supine self snags, 1x15 B  Supine dowel flexion (5#) 2x10  S/L open book (to tolerance) x 10     Seated cervical rotation AROM x 20 (B) - NP  Corner pec stretch, 3x 30s - NP  Towel slides (BUE) x 10, 5 sec hold - NP    Oc received the following manual therapy techniques: Soft tissue Mobilization were applied to the:  cervical musculature for 10 minutes, including:  Cervical paraspinals, bilateral upper traps     Oc participated in neuromuscular re-education activities to improve: Coordination, Proprioception and Posture for 15 minutes. The following activities were included:  Supine horizontal abd w/ blue band, 2x10  Supine X's w/ blue band, 2x10  No moneys w/ blue band, 3x10  Standing rows w/ blue band, 3x10  Standing shoulder ext w/ blue band, 3x10  Standing IR/ER with green TB 2x10 (B)     Home Exercises Provided and Patient Education Provided     Education provided:   - HEP compliance    Written Home Exercises Provided: Patient instructed to cont prior HEP.  Exercises were reviewed and Oc was able to demonstrate them prior to the end of the session.  Oc demonstrated good  understanding of the education provided.     See EMR under Patient Instructions for exercises provided 06/22/2022.    Assessment     Pt tolerated tx well. Demonstrates improvement in overall AROM, though still has significant reduction in AROM, which is expected due to extensive cervical fusion. Posture shows some improvement, but still sitting with rounded shoulders. No increases in pain with strengthening exercises.     Oc Is progressing well towards his goals.   Pt prognosis is Good.     Pt will continue to benefit from skilled outpatient physical therapy to address the deficits listed in the problem list box on initial evaluation, provide pt/family education and to maximize pt's level of independence in the home and community environment.     Pt's spiritual, cultural and educational needs considered and pt agreeable to plan of care and goals.    Anticipated barriers to physical therapy: none    Goals:   Short Term Goals (3 Weeks):   1) Pt will demonstrate compliance with initial home exercise program as prescribed by physical therapist to improve independence with management of condition. - MET  2) Pt to improve active range of motion in  cervical spine to 40 degrees rotation to allow for improved functional mobility. - NOT MET  3) Pt to report cervical pain of <4/10 at worst to improve tolerance to ADLs and work. - NOT MET     Long Term Goals (6 Weeks):   1. Pt to achieve <37% limitation as measured by the FOTO to demonstrate decreased disability. - NOT MET  2) Pt to improve active range of motion in cervical spine to 30 degrees side bending to allow for improved functional mobility. - NOT MET  3) Pt to report cervical pain of <2/10 at worst to improve tolerance to ADLs and work. - NOT MET  4) Pt to increase strength to at least 5/5 of muscles tested to allow for improvement in functional activities. - MET    Plan     Continue current POC with emphasis on restoring AROM and postural strength.     Renan Sampson, PT

## 2022-07-21 NOTE — PLAN OF CARE
[facility]  Physical Therapy Re-Assessment    Name: Oc Solis  Clinic Number: [id number]    Therapy Diagnosis:   Encounter Diagnoses   Name Primary?    Decreased range of motion of neck Yes    Weakness of both upper extremities     S/P cervical spinal fusion     Poor posture      Physician: Donna Angeles NP    Physician Orders: PT Eval and Treat   Medical Diagnosis from Referral: Z98.1 (ICD-10-CM) - S/P cervical spinal fusion  Evaluation Date: 6/22/2022  Authorization Period Expiration: 12/31/2022  Plan of Care Expiration: 8/26/22  Visit # / Visits authorized: 8 / 20    Time In: 10:00  Time Out: 10:45  Total Billable Time: 45 minutes    Precautions: Standard and C3-T1 fusion on 4/29/22    Subjective   Date of onset: C3-T1 fusion on 4/29/22  History of current condition - Oc reports: Pt is doing well, not really having any pain in the neck. Reports he only has pain after he mows the grass. He does still feel he has a lot of tightness in the neck though, reports he is doing some stretches at home. He has been lifting and being more active at work without issue. Reports he has started wearing his soft collar to sleep again, feels more comfortable and sleeps better with it on. Reports he feels he has made good progress and is ready for discharge next week.      Medical History:   Past Medical History:   Diagnosis Date    Anxiety     Asthma     childhood    Candidiasis of skin and nails     Hyperglycemia     Hyperlipidemia     Hypertension     Laceration     Neck pain     Rectal/anal hemorrhage     Unspecified adverse effect of unspecified drug, medicinal and biological substance        Surgical History:   Oc Solis  has a past surgical history that includes Kyphosis surgery (03/19/2013); Colonoscopy (N/A, 1/17/2017); Cervical fusion (07/2000); Fusion of posterior column of cervical spine using computer aided navigation (N/A, 4/29/2022); and Posterior cervical  laminectomy (4/29/2022).    Medications:   Oc has a current medication list which includes the following prescription(s): acetaminophen, albuterol, blood sugar diagnostic, blood-glucose meter, diazepam, gabapentin, hydrocodone-acetaminophen, lancets, losartan, metformin, metoprolol tartrate, omeprazole, rosuvastatin, tizanidine, and [DISCONTINUED] aspirin.    Allergies:   Review of patient's allergies indicates:   Allergen Reactions    Percocet [oxycodone-acetaminophen] Itching    Penicillins Rash        Imaging, xray: 04/30/2022   FINDINGS: Interval C3-T1 posterior fusion with bilateral lateral mass screws at C3-6 and bilateral transpedicular screws at T1.  Bilateral posterior rods.  C3-C7 laminectomies.  Evidence of hardware loosening.  The right T1 screw projects over the C7-T1 disc space.  Solid osseous fusion of the C6 and 7 vertebral bodies.  No acute osseous fracture.  Prominent anterior osteophytes C4-5.  No unexpected radiodense soft tissue foreign body.  Dorsal skin staples.  Neck brace in place.    06/08/2022   FINDINGS: Since the prior study, posterior skin staples have been removed.  Otherwise, there is no definite change in the appearance of the cervical spine compared to the prior study.  There is solid bony fusion of C6 and C7.  There is marked disc space narrowing at the C4-5 and C5-6 levels where there is endplate osteophyte formation.  The odontoid process is intact.  There is only mild disc space narrowing at the C3-4 level.  There are postsurgical changes of posterior instrumented fusion from C3 through T1 with bilateral lateral mass screws at C3, C4, C5 and C6.  There are bilateral pedicle screws at the T1 level.  As previously demonstrated, the right T1 screw projects over the C7-T1 disc space.  There is no obvious hardware fracture or loosening.  The prevertebral soft tissues appear normal.    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 Significant hypomobility of the neck in all directions. Pain with resisted shoulder flexion.     Cervical Range of Motion:    Degrees Pain   Flexion 25 No     Extension 10 No     Right Rotation 45 No     Left Rotation 35 No     Right Side Bending 15 No   Left Side Bending 15 No      Shoulder Range of Motion:   Shoulder Left Right   Flexion 170 170   Abduction 170 170     Upper Extremity Strength  (R) UE  (L) UE    Shoulder flexion: 5/5 Shoulder flexion: 5/5   Shoulder Abduction: 5/5 Shoulder abduction: 5/5   Shoulder ER 5/5 Shoulder ER 5/5   Shoulder IR 5/5 Shoulder IR 5/5   Elbow flexion: 5/5 Elbow flexion: 5/5   Elbow extension: 5/5 Elbow extension: 5/5   Wrist extension: 5/5 Wrist extension: 5/5       Joint Mobility: Hypomobility throughout the cervical spine    Palpation: Mild TTP in B upper traps    Sensation: Intact to light touch     Flexibility: Decreased pec minor flexibility    CMS Impairment/Limitation/Restriction for FOTO NECK Survey    Therapist reviewed FOTO scores for Oc Solis on 7/21/2022.   FOTO documents entered into [employer] - see Media section.    Limitation Score: 45%  Category: Mobility       TREATMENT   Treatment Time In: 10:05  Treatment Time Out: 10:45  Total Treatment time separate from Evaluation: 40 minutes    Oc received therapeutic exercises to develop strength, endurance,  ROM, flexibility, posture and core stabilization for 15 minutes including:  UBE x 6 minutes lvl 1.3 (alternating forward/backward)   PROM cervical rotation x 3 minutes   Supine self snags, 1x10 B w/ 10s hold  Supine dowel flexion (5#) 2x10  S/L open book (to tolerance) x 10     Seated cervical rotation AROM x 20 (B) - NP  Corner pec stretch, 3x 30s - NP  Towel slides (BUE) x 10, 5 sec hold - NP    Oc received the following manual therapy techniques: Soft tissue Mobilization were applied to the: cervical musculature for 10 minutes, including:  Cervical paraspinals, bilateral upper traps     Oc participated in neuromuscular re-education activities to improve: Coordination, Proprioception and Posture for 15 minutes. The following activities were included:  Supine horizontal abd w/ blue band, 2x10  Supine X's w/ blue band, 2x10  No moneys w/ blue band, 3x10  Standing rows w/ blue band, 3x10  Standing shoulder ext w/ blue band, 3x10  Standing IR/ER with green TB 2x10 (B)     Home Exercises and Patient Education Provided    Education provided:   - Continue HEP    Written Home Exercises Provided: yes.  Exercises were reviewed and Oc was able to demonstrate them prior to the end of the session.  Oc demonstrated good  understanding of the education provided.     See EMR under Patient Instructions for exercises provided 6/22/2022.    Assessment   Oc is a 62 y.o. male referred to outpatient Physical Therapy with a medical diagnosis of Z98.1 (ICD-10-CM) - S/P cervical spinal fusion. Pt has made good progress in strength and shoulder AROM. Cervical AROM has imporved, though still has significant deficits, which is expected due to extensive cervical fusion. Pt has begun to return to normal work activities and duties without issue, though still limiting himself in the amount he will lift. Overall progress well and will continue to benefit from skilled PT to address remaining deficits in cervical ROM and  functional mobility.     Pt prognosis is Good.   Pt will benefit from skilled outpatient Physical Therapy to address the deficits stated above and in the chart below, provide pt/family education, and to maximize pt's level of independence.     Plan of care discussed with patient: Yes  Pt's spiritual, cultural and educational needs considered and patient is agreeable to the plan of care and goals as stated below:     Anticipated Barriers for therapy: None    Medical Necessity is demonstrated by the following  History  Co-morbidities and personal factors that may impact the plan of care Co-morbidities:   diabetes, difficulty sleeping, high BMI and HTN    Personal Factors:   no deficits     moderate   Examination  Body Structures and Functions, activity limitations and participation restrictions that may impact the plan of care Body Regions:   neck  lower extremities    Body Systems:    ROM  strength  motor control    Participation Restrictions:   None    Activity limitations:   Learning and applying knowledge  no deficits    General Tasks and Commands  no deficits    Communication  no deficits    Mobility  lifting and carrying objects  driving (bike, car, motorcycle)    Self care  no deficits    Domestic Life  doing house work (cleaning house, washing dishes, laundry)    Interactions/Relationships  no deficits    Life Areas  employment    Community and Social Life  recreation and leisure         moderate   Clinical Presentation stable and uncomplicated low   Decision Making/ Complexity Score: low     Goals:  Short Term Goals (3 Weeks):   1) Pt will demonstrate compliance with initial home exercise program as prescribed by physical therapist to improve independence with management of condition. - MET  2) Pt to improve active range of motion in cervical spine to 40 degrees rotation to allow for improved functional mobility. - NOT MET  3) Pt to report cervical pain of <4/10 at worst to improve tolerance to ADLs and work.  - NOT MET    Long Term Goals (6 Weeks):   1. Pt to achieve <37% limitation as measured by the FOTO to demonstrate decreased disability. - NOT MET  2) Pt to improve active range of motion in cervical spine to 30 degrees side bending to allow for improved functional mobility. - NOT MET  3) Pt to report cervical pain of <2/10 at worst to improve tolerance to ADLs and work. - NOT MET  4) Pt to increase strength to at least 5/5 of muscles tested to allow for improvement in functional activities. - MET    Plan   Plan of care Certification: 6/22/2022 to 8/26/22.    Outpatient Physical Therapy 2 times weekly for 6 weeks to include the following interventions: Electrical Stimulation  , Manual Therapy, Moist Heat/ Ice, Neuromuscular Re-ed, Patient Education, Therapeutic Activities and Therapeutic Exercise.     Renan Sampson, PT

## 2022-07-26 NOTE — TELEPHONE ENCOUNTER
Called patient to discuss MyChart messages. The patient reports he is having numbness and tingling mostly in the thighs to the knee.  This does extend into the distal portion of the lower extremities in the plantar aspect of bilateral feet.  He denies weakness but the numbness makes it difficult to walk.  He has to hold onto walls another objects for stability.  He discussed the possibility of peripheral neuropathy with his PCP but has since been taken off all diabetic medications and feels this is unlikely.    He does have some residual tingling in his fingers from spinal cord compression prior to recent PCF.  However much of his symptoms have improved.  He had an EMG which was inconclusive due to his body habitus.    Will obtain lumbar spine imaging with an MRI and x-rays, review with Dr. Mercado call the patient with treatment options.  Please call schedule the patient for imaging.  Orders RN.  Thank you.

## 2022-08-04 ENCOUNTER — HOSPITAL ENCOUNTER (OUTPATIENT)
Dept: RADIOLOGY | Facility: HOSPITAL | Age: 62
Discharge: HOME | End: 2022-08-04
Attending: NURSE PRACTITIONER
Payer: COMMERCIAL

## 2022-08-04 DIAGNOSIS — M54.16 LUMBAR RADICULOPATHY, CHRONIC: ICD-10-CM

## 2022-08-04 DIAGNOSIS — R20.2 PARESTHESIAS: ICD-10-CM

## 2022-08-04 PROCEDURE — 72110 X-RAY EXAM L-2 SPINE 4/>VWS: CPT | Mod: 26,,, | Performed by: RADIOLOGY

## 2022-08-04 PROCEDURE — 72148 MRI LUMBAR SPINE W/O DYE: CPT | Mod: TC,PO

## 2022-08-04 PROCEDURE — 72148 MRI LUMBAR SPINE WITHOUT CONTRAST: ICD-10-PCS | Mod: 26,,, | Performed by: RADIOLOGY

## 2022-08-04 PROCEDURE — 72110 X-RAY EXAM L-2 SPINE 4/>VWS: CPT | Mod: TC,FY,PO

## 2022-08-04 PROCEDURE — 72110 XR LUMBAR SPINE AP AND LAT WITH FLEX/EXT: ICD-10-PCS | Mod: 26,,, | Performed by: RADIOLOGY

## 2022-08-04 PROCEDURE — 72148 MRI LUMBAR SPINE W/O DYE: CPT | Mod: 26,,, | Performed by: RADIOLOGY

## 2022-08-09 ENCOUNTER — HOSPITAL ENCOUNTER (OUTPATIENT)
Dept: RADIOLOGY | Facility: HOSPITAL | Age: 62
Discharge: HOME | End: 2022-08-09
Attending: NURSE PRACTITIONER
Payer: COMMERCIAL

## 2022-08-09 DIAGNOSIS — R20.2 PARESTHESIAS: ICD-10-CM

## 2022-08-09 DIAGNOSIS — R20.0 LOWER EXTREMITY NUMBNESS: ICD-10-CM

## 2022-08-09 PROCEDURE — 72146 MRI CHEST SPINE W/O DYE: CPT | Mod: TC,PO

## 2022-08-09 PROCEDURE — 72146 MRI THORACIC SPINE WITHOUT CONTRAST: ICD-10-PCS | Mod: 26,,, | Performed by: RADIOLOGY

## 2022-08-09 PROCEDURE — 72141 MRI NECK SPINE W/O DYE: CPT | Mod: 26,,, | Performed by: RADIOLOGY

## 2022-08-09 PROCEDURE — 72141 MRI NECK SPINE W/O DYE: CPT | Mod: TC,PO

## 2022-08-09 PROCEDURE — 72141 MRI CERVICAL SPINE WITHOUT CONTRAST: ICD-10-PCS | Mod: 26,,, | Performed by: RADIOLOGY

## 2022-08-09 PROCEDURE — 72146 MRI CHEST SPINE W/O DYE: CPT | Mod: 26,,, | Performed by: RADIOLOGY

## 2022-08-10 ENCOUNTER — PATIENT MESSAGE (OUTPATIENT)
Dept: NEUROSURGERY | Facility: CLINIC | Age: 62
End: 2022-08-10
Payer: COMMERCIAL

## 2022-08-18 ENCOUNTER — OFFICE VISIT (OUTPATIENT)
Dept: NEUROSURGERY | Facility: CLINIC | Age: 62
End: 2022-08-18
Payer: COMMERCIAL

## 2022-08-18 VITALS
WEIGHT: 255.94 LBS | BODY MASS INDEX: 38.79 KG/M2 | RESPIRATION RATE: 16 BRPM | DIASTOLIC BLOOD PRESSURE: 77 MMHG | HEART RATE: 60 BPM | SYSTOLIC BLOOD PRESSURE: 163 MMHG | HEIGHT: 68 IN

## 2022-08-18 DIAGNOSIS — M54.16 LUMBAR RADICULOPATHY, CHRONIC: Primary | ICD-10-CM

## 2022-08-18 PROCEDURE — 3066F NEPHROPATHY DOC TX: CPT | Mod: CPTII,S$GLB,, | Performed by: NEUROLOGICAL SURGERY

## 2022-08-18 PROCEDURE — 3078F PR MOST RECENT DIASTOLIC BLOOD PRESSURE < 80 MM HG: ICD-10-PCS | Mod: CPTII,S$GLB,, | Performed by: NEUROLOGICAL SURGERY

## 2022-08-18 PROCEDURE — 3078F DIAST BP <80 MM HG: CPT | Mod: CPTII,S$GLB,, | Performed by: NEUROLOGICAL SURGERY

## 2022-08-18 PROCEDURE — 3066F PR DOCUMENTATION OF TREATMENT FOR NEPHROPATHY: ICD-10-PCS | Mod: CPTII,S$GLB,, | Performed by: NEUROLOGICAL SURGERY

## 2022-08-18 PROCEDURE — 3008F PR BODY MASS INDEX (BMI) DOCUMENTED: ICD-10-PCS | Mod: CPTII,S$GLB,, | Performed by: NEUROLOGICAL SURGERY

## 2022-08-18 PROCEDURE — 3077F PR MOST RECENT SYSTOLIC BLOOD PRESSURE >= 140 MM HG: ICD-10-PCS | Mod: CPTII,S$GLB,, | Performed by: NEUROLOGICAL SURGERY

## 2022-08-18 PROCEDURE — 3044F HG A1C LEVEL LT 7.0%: CPT | Mod: CPTII,S$GLB,, | Performed by: NEUROLOGICAL SURGERY

## 2022-08-18 PROCEDURE — 3077F SYST BP >= 140 MM HG: CPT | Mod: CPTII,S$GLB,, | Performed by: NEUROLOGICAL SURGERY

## 2022-08-18 PROCEDURE — 3044F PR MOST RECENT HEMOGLOBIN A1C LEVEL <7.0%: ICD-10-PCS | Mod: CPTII,S$GLB,, | Performed by: NEUROLOGICAL SURGERY

## 2022-08-18 PROCEDURE — 4010F PR ACE/ARB THEARPY RXD/TAKEN: ICD-10-PCS | Mod: CPTII,S$GLB,, | Performed by: NEUROLOGICAL SURGERY

## 2022-08-18 PROCEDURE — 4010F ACE/ARB THERAPY RXD/TAKEN: CPT | Mod: CPTII,S$GLB,, | Performed by: NEUROLOGICAL SURGERY

## 2022-08-18 PROCEDURE — 99215 PR OFFICE/OUTPT VISIT, EST, LEVL V, 40-54 MIN: ICD-10-PCS | Mod: S$GLB,,, | Performed by: NEUROLOGICAL SURGERY

## 2022-08-18 PROCEDURE — 99215 OFFICE O/P EST HI 40 MIN: CPT | Mod: S$GLB,,, | Performed by: NEUROLOGICAL SURGERY

## 2022-08-18 PROCEDURE — 3061F PR NEG MICROALBUMINURIA RESULT DOCUMENTED/REVIEW: ICD-10-PCS | Mod: CPTII,S$GLB,, | Performed by: NEUROLOGICAL SURGERY

## 2022-08-18 PROCEDURE — 3008F BODY MASS INDEX DOCD: CPT | Mod: CPTII,S$GLB,, | Performed by: NEUROLOGICAL SURGERY

## 2022-08-18 PROCEDURE — 3061F NEG MICROALBUMINURIA REV: CPT | Mod: CPTII,S$GLB,, | Performed by: NEUROLOGICAL SURGERY

## 2022-08-18 NOTE — PROGRESS NOTES
Neurosurgery History & Physical    Patient ID: Oc Solis is a 62 y.o. male.    Chief Complaint   Patient presents with    Back Pain     C/o mid back pain, rates pain 3/10. Positive numbness and tingling to bilat hands and positive bilat numbness and tingling bilat extremities.        History of Present Illness:   Mr. Solis is a 62 year old male who is 4 months s/p C3-T1 posterior cervical laminectomy and fusion with improvement in arm and  strength and dexterity. His gait has significantly improved postoperatively and he feels more stable with ambulation.    At his initial postoperative visit, he reported persistent numbness in his fingertips in the 3-5th digits and new numbness, tingling in the anterolateral aspect of bilateral lower extremities and feet. He reports tenderness to touch in the thighs.     Since that time his symptoms and back pain have progressed. We obtained an EMG in July and the patient also requested imaging of the C/T/L spine. Diagnostics are complete and he returns today to discuss results.     Review of Systems   Constitutional: Negative for activity change and fatigue.   Eyes: Negative for photophobia and visual disturbance.   Respiratory: Negative for cough.    Cardiovascular: Negative for leg swelling.   Gastrointestinal: Negative for nausea.   Genitourinary: Negative for difficulty urinating.   Musculoskeletal: Positive for back pain and myalgias. Negative for arthralgias, gait problem and neck pain.   Skin: Negative for wound.   Neurological: Positive for numbness. Negative for dizziness, weakness and headaches.   Psychiatric/Behavioral: Negative for confusion.       Past Medical History:   Diagnosis Date    Anxiety     Asthma     childhood    Candidiasis of skin and nails     Hyperglycemia     Hyperlipidemia     Hypertension     Laceration     Neck pain     Rectal/anal hemorrhage     Unspecified adverse effect of unspecified drug, medicinal and biological  substance      Social History     Socioeconomic History    Marital status:    Tobacco Use    Smoking status: Never Smoker    Smokeless tobacco: Never Used   Substance and Sexual Activity    Alcohol use: Yes     Alcohol/week: 8.0 standard drinks     Types: 2 Cans of beer, 6 Glasses of wine per week     Comment: less than1 bottle of wine per week now pt reports    Drug use: No    Sexual activity: Yes     Partners: Female     Family History   Problem Relation Age of Onset    Thyroid disease Mother     Cancer Mother         lung    Hyperlipidemia Father     Heart disease Father      Review of patient's allergies indicates:   Allergen Reactions    Percocet [oxycodone-acetaminophen] Itching    Penicillins Rash       Current Outpatient Medications:     acetaminophen (TYLENOL) 500 MG tablet, Take 1,000 mg by mouth every 6 (six) hours as needed for Pain., Disp: , Rfl:     albuterol (PROVENTIL/VENTOLIN HFA) 90 mcg/actuation inhaler, Inhale 2 puffs into the lungs every 4 (four) hours as needed for Wheezing or Shortness of Breath. Rescue, Disp: 18 g, Rfl: 1    blood sugar diagnostic Strp, To check BG onc times daily, to use with insurance preferred meter, Disp: 50 each, Rfl: 9    blood-glucose meter kit, Use to check glucose daily, Disp: 1 each, Rfl: 0    diazePAM (VALIUM) 5 MG tablet, Take 1 tablet (5 mg total) by mouth nightly as needed for Anxiety., Disp: 30 tablet, Rfl: 0    gabapentin (NEURONTIN) 300 MG capsule, Take 1 capsule (300 mg total) by mouth 3 (three) times daily. (Patient taking differently: Take 300 mg by mouth 3 (three) times daily as needed.), Disp: 90 capsule, Rfl: 1    HYDROcodone-acetaminophen (NORCO)  mg per tablet, Take 1 tablet by mouth every 8 (eight) hours as needed for Pain., Disp: 21 tablet, Rfl: 0    lancets Misc, To check BG one times daily, to use with insurance preferred meter, Disp: 50 each, Rfl: 9    losartan (COZAAR) 50 MG tablet, Take 1 tablet (50 mg total)  "by mouth once daily. (Patient taking differently: Take 50 mg by mouth every evening.), Disp: 90 tablet, Rfl: 3    metFORMIN (GLUCOPHAGE) 500 MG tablet, Take 1 tablet (500 mg total) by mouth 2 (two) times daily with meals., Disp: 180 tablet, Rfl: 3    metoprolol tartrate (LOPRESSOR) 25 MG tablet, TAKE 1 TABLET BY MOUTH THREE TIMES A DAY, Disp: 90 tablet, Rfl: 1    omeprazole (PRILOSEC) 20 MG capsule, Take 1 capsule (20 mg total) by mouth daily as needed (heartburn)., Disp: 90 capsule, Rfl: 1    rosuvastatin (CRESTOR) 5 MG tablet, Take 1 tablet (5 mg total) by mouth once daily., Disp: 90 tablet, Rfl: 3    tiZANidine (ZANAFLEX) 4 MG tablet, Take 1 tablet (4 mg total) by mouth every 8 (eight) hours as needed (spasms)., Disp: 30 tablet, Rfl: 0  Blood pressure (!) 163/77, pulse 60, resp. rate 16, height 5' 8" (1.727 m), weight 116.1 kg (255 lb 15.3 oz).      Neurologic Exam     Mental Status   Oriented to person, place, and time.   Level of consciousness: alert    Cranial Nerves     CN VII   Facial expression full, symmetric.     Motor Exam   Muscle bulk: normal  Overall muscle tone: normal    Strength   Strength 5/5 throughout.     Sensory Exam   Light touch normal.     Gait, Coordination, and Reflexes     Gait  Gait: normal    Reflexes   Right biceps: 1+  Left biceps: 1+  Right patellar: 1+  Left patellar: 1+  Right Sandra: absent  Left Sandra: absent    - tinel's at the wrist    Physical Exam  Constitutional:       Appearance: He is well-developed.   HENT:      Head: Normocephalic and atraumatic.   Eyes:      Conjunctiva/sclera: Conjunctivae normal.   Abdominal:      Tenderness: There is no guarding.   Musculoskeletal:         General: Normal range of motion.      Cervical back: Normal range of motion.   Skin:     General: Skin is warm and dry.   Neurological:      Mental Status: He is alert and oriented to person, place, and time.      Motor: Motor strength is normal.      Gait: Gait is intact.      Deep Tendon " Reflexes:      Reflex Scores:       Bicep reflexes are 1+ on the right side and 1+ on the left side.       Patellar reflexes are 1+ on the right side and 1+ on the left side.        Imaging personally reviewed and discussed with the patient:  EMG demonstrates   1. reduction in amplitude of the left peroneal motor nerve conduction response, and an absent response from the right sural sensory nerve conduction study.  These findings, in isolation, are somewhat nonspecific.  Considering the patient's symptoms are symmetric in the lower extremities and do not correlate with peroneal or sural neuropathies in isolation, there is high probability that the abnormal findings are due to technical error related to his body habitus (adipose layer surrounding the distal nerves).  2. There is insufficient electrodiagnostic evidence for diagnoses of peripheral polyneuropathy, myopathy, or active axonal lumbosacral radiculopathy/plexopathy of the left lower extremity.      MRI Cervical spine from 8/2022 demonstrates relief of previously demonstrated multilevel spinal stenosis in this patient status post posterior fusion and decompression.  There is, however, multilevel foraminal stenosis without significant change compared to the preoperative studies.is a well-defined fluid collection within the posterior soft tissue centered at the levels of C7 and T1.  Measurements are provided above.  This likely represents a postoperative seroma/hematoma.      MRI Thoracic spine dated 8/2022 demonstrates mild degenerative changes and remote compression deformity at T12 s/p kyphoplasty.     MRI lumbar spine dated 8/2022 demonstrates multilevel degenerative changes, most pronounced at L4-5 where there is severe canal stenosis and moderate foraminal stenosis.     Assessment & Plan:   62 year old s/p C3-T1 posterior cervical laminectomy and fusion with peripheral numbness and tingling in the upper and lower extremities. We discussed diagnostics,  imaging findings at length. We explained that residual numbness and tingling in the his fingertips, although improved, could remain given prior degree of cervical stenosis, cord compression.     In regard to his lumbar spine, pathology at L4-5 could partially explain his lower extremity issues. Surgery with a laminectomy to address stenosis. He is not interested in further surgical intervention.     After discussion, he elected to continue with therapy and managing symptoms conservatively. He will contact our office with any questions or concerns.

## 2022-08-26 ENCOUNTER — PATIENT MESSAGE (OUTPATIENT)
Dept: NEUROSURGERY | Facility: CLINIC | Age: 62
End: 2022-08-26
Payer: COMMERCIAL

## 2022-08-29 ENCOUNTER — PATIENT MESSAGE (OUTPATIENT)
Dept: NEUROSURGERY | Facility: CLINIC | Age: 62
End: 2022-08-29
Payer: COMMERCIAL

## 2022-08-29 RX ORDER — HYDROCODONE BITARTRATE AND ACETAMINOPHEN 5; 325 MG/1; MG/1
1 TABLET ORAL EVERY 8 HOURS PRN
Qty: 21 TABLET | Refills: 0 | Status: SHIPPED | OUTPATIENT
Start: 2022-08-29 | End: 2022-10-07

## 2022-10-02 ENCOUNTER — PATIENT MESSAGE (OUTPATIENT)
Dept: NEUROSURGERY | Facility: CLINIC | Age: 62
End: 2022-10-02
Payer: COMMERCIAL

## 2022-10-02 DIAGNOSIS — Z98.1 S/P CERVICAL SPINAL FUSION: Primary | ICD-10-CM

## 2022-10-02 DIAGNOSIS — G89.28 OTHER CHRONIC POSTPROCEDURAL PAIN: ICD-10-CM

## 2022-10-03 ENCOUNTER — PATIENT MESSAGE (OUTPATIENT)
Dept: NEUROSURGERY | Facility: CLINIC | Age: 62
End: 2022-10-03
Payer: COMMERCIAL

## 2022-10-07 ENCOUNTER — OFFICE VISIT (OUTPATIENT)
Dept: PAIN MEDICINE | Facility: CLINIC | Age: 62
End: 2022-10-07
Payer: COMMERCIAL

## 2022-10-07 VITALS
SYSTOLIC BLOOD PRESSURE: 215 MMHG | HEIGHT: 68 IN | HEART RATE: 68 BPM | OXYGEN SATURATION: 98 % | WEIGHT: 267.63 LBS | DIASTOLIC BLOOD PRESSURE: 91 MMHG | BODY MASS INDEX: 40.56 KG/M2

## 2022-10-07 DIAGNOSIS — M54.16 LUMBAR RADICULOPATHY: Primary | ICD-10-CM

## 2022-10-07 DIAGNOSIS — G89.4 CHRONIC PAIN SYNDROME: ICD-10-CM

## 2022-10-07 DIAGNOSIS — Z98.1 S/P CERVICAL SPINAL FUSION: ICD-10-CM

## 2022-10-07 PROCEDURE — 3008F PR BODY MASS INDEX (BMI) DOCUMENTED: ICD-10-PCS | Mod: CPTII,S$GLB,, | Performed by: ANESTHESIOLOGY

## 2022-10-07 PROCEDURE — 1159F PR MEDICATION LIST DOCUMENTED IN MEDICAL RECORD: ICD-10-PCS | Mod: CPTII,S$GLB,, | Performed by: ANESTHESIOLOGY

## 2022-10-07 PROCEDURE — 3044F PR MOST RECENT HEMOGLOBIN A1C LEVEL <7.0%: ICD-10-PCS | Mod: CPTII,S$GLB,, | Performed by: ANESTHESIOLOGY

## 2022-10-07 PROCEDURE — 3077F PR MOST RECENT SYSTOLIC BLOOD PRESSURE >= 140 MM HG: ICD-10-PCS | Mod: CPTII,S$GLB,, | Performed by: ANESTHESIOLOGY

## 2022-10-07 PROCEDURE — 99999 PR PBB SHADOW E&M-EST. PATIENT-LVL IV: ICD-10-PCS | Mod: PBBFAC,,, | Performed by: ANESTHESIOLOGY

## 2022-10-07 PROCEDURE — 1160F RVW MEDS BY RX/DR IN RCRD: CPT | Mod: CPTII,S$GLB,, | Performed by: ANESTHESIOLOGY

## 2022-10-07 PROCEDURE — 3044F HG A1C LEVEL LT 7.0%: CPT | Mod: CPTII,S$GLB,, | Performed by: ANESTHESIOLOGY

## 2022-10-07 PROCEDURE — 3080F DIAST BP >= 90 MM HG: CPT | Mod: CPTII,S$GLB,, | Performed by: ANESTHESIOLOGY

## 2022-10-07 PROCEDURE — 4010F ACE/ARB THERAPY RXD/TAKEN: CPT | Mod: CPTII,S$GLB,, | Performed by: ANESTHESIOLOGY

## 2022-10-07 PROCEDURE — 99214 PR OFFICE/OUTPT VISIT, EST, LEVL IV, 30-39 MIN: ICD-10-PCS | Mod: S$GLB,,, | Performed by: ANESTHESIOLOGY

## 2022-10-07 PROCEDURE — 3061F NEG MICROALBUMINURIA REV: CPT | Mod: CPTII,S$GLB,, | Performed by: ANESTHESIOLOGY

## 2022-10-07 PROCEDURE — 3066F NEPHROPATHY DOC TX: CPT | Mod: CPTII,S$GLB,, | Performed by: ANESTHESIOLOGY

## 2022-10-07 PROCEDURE — 3061F PR NEG MICROALBUMINURIA RESULT DOCUMENTED/REVIEW: ICD-10-PCS | Mod: CPTII,S$GLB,, | Performed by: ANESTHESIOLOGY

## 2022-10-07 PROCEDURE — 1160F PR REVIEW ALL MEDS BY PRESCRIBER/CLIN PHARMACIST DOCUMENTED: ICD-10-PCS | Mod: CPTII,S$GLB,, | Performed by: ANESTHESIOLOGY

## 2022-10-07 PROCEDURE — 3008F BODY MASS INDEX DOCD: CPT | Mod: CPTII,S$GLB,, | Performed by: ANESTHESIOLOGY

## 2022-10-07 PROCEDURE — 3080F PR MOST RECENT DIASTOLIC BLOOD PRESSURE >= 90 MM HG: ICD-10-PCS | Mod: CPTII,S$GLB,, | Performed by: ANESTHESIOLOGY

## 2022-10-07 PROCEDURE — 1159F MED LIST DOCD IN RCRD: CPT | Mod: CPTII,S$GLB,, | Performed by: ANESTHESIOLOGY

## 2022-10-07 PROCEDURE — 3066F PR DOCUMENTATION OF TREATMENT FOR NEPHROPATHY: ICD-10-PCS | Mod: CPTII,S$GLB,, | Performed by: ANESTHESIOLOGY

## 2022-10-07 PROCEDURE — 99214 OFFICE O/P EST MOD 30 MIN: CPT | Mod: S$GLB,,, | Performed by: ANESTHESIOLOGY

## 2022-10-07 PROCEDURE — 3077F SYST BP >= 140 MM HG: CPT | Mod: CPTII,S$GLB,, | Performed by: ANESTHESIOLOGY

## 2022-10-07 PROCEDURE — 99999 PR PBB SHADOW E&M-EST. PATIENT-LVL IV: CPT | Mod: PBBFAC,,, | Performed by: ANESTHESIOLOGY

## 2022-10-07 PROCEDURE — 4010F PR ACE/ARB THEARPY RXD/TAKEN: ICD-10-PCS | Mod: CPTII,S$GLB,, | Performed by: ANESTHESIOLOGY

## 2022-10-07 RX ORDER — PREGABALIN 75 MG/1
75 CAPSULE ORAL 2 TIMES DAILY
Qty: 60 CAPSULE | Refills: 1 | Status: SHIPPED | OUTPATIENT
Start: 2022-10-07 | End: 2022-12-14 | Stop reason: SDUPTHER

## 2022-10-07 RX ORDER — ALPRAZOLAM 0.5 MG/1
1 TABLET, ORALLY DISINTEGRATING ORAL ONCE AS NEEDED
Status: CANCELLED | OUTPATIENT
Start: 2022-10-07 | End: 2034-03-05

## 2022-10-07 RX ORDER — IBUPROFEN 800 MG/1
800 TABLET ORAL EVERY 6 HOURS PRN
COMMUNITY
Start: 2022-09-20

## 2022-10-07 RX ORDER — HYDROCODONE BITARTRATE AND ACETAMINOPHEN 7.5; 325 MG/1; MG/1
1 TABLET ORAL EVERY 12 HOURS PRN
Qty: 40 TABLET | Refills: 0 | Status: SHIPPED | OUTPATIENT
Start: 2022-10-07 | End: 2022-12-13 | Stop reason: SDUPTHER

## 2022-10-07 RX ORDER — CLINDAMYCIN HYDROCHLORIDE 150 MG/1
150 CAPSULE ORAL EVERY 6 HOURS
COMMUNITY
Start: 2022-09-20 | End: 2023-02-24 | Stop reason: ALTCHOICE

## 2022-10-07 NOTE — H&P (VIEW-ONLY)
[facility] Follow Evaluation    Referred by: Donna Angeles NP  Reason for referral: neck pain    CC:   Chief Complaint   Patient presents with    Neck Pain        Last 3 PDI Scores 3/8/2022   Pain Disability Index (PDI) 46       Interval HPI 10/7/22: Mr. Solis returns to the office for follow up.  Since I last saw him he is s/p laminectomy at C3-T1 on 4/29/22 with Dr. Mercado.  He reports much improvement following surgery as he is now a lot more mobile and has a lot more  strength than prior to surgery.  He reports he continues to have neck pain radiating in his shoulders he also reports to have continue tingling into the 3rd 4th and 5th digits in the bilateral hands.  He also complains of lower back pain that radiates down both legs into his feet.    HPI:   Oc Solis is a 62 y.o. male who complains of neck pain.  He has been having worsening neck pain for the last month.  Today his pain is 8/10, constant, burning, numb, tingling with radiation down both arms to both hands.  He reports numbness and weakness.  He denies any bowel bladder control problems.  His pain is worse with laying, coughing sneezing, lifting, nighttime.    History:    Current Outpatient Medications:     diazePAM (VALIUM) 5 MG tablet, Take 1 tablet (5 mg total) by mouth nightly as needed for Anxiety., Disp: 30 tablet, Rfl: 0    lancets Misc, To check BG one times daily, to use with insurance preferred meter, Disp: 50 each, Rfl: 9    losartan (COZAAR) 50 MG tablet, Take 1 tablet (50 mg total) by mouth once daily., Disp: 90 tablet, Rfl: 3    metoprolol tartrate (LOPRESSOR) 25 MG tablet, TAKE 1 TABLET BY MOUTH THREE TIMES A DAY, Disp: 90 tablet, Rfl: 1    rosuvastatin (CRESTOR) 5 MG tablet, Take 1 tablet (5 mg total) by mouth once daily., Disp: 90 tablet, Rfl: 3    tiZANidine (ZANAFLEX) 4 MG tablet, TAKE 1 TABLET BY MOUTH EVERY 6 HOURS AS NEEDED, Disp: 30 tablet, Rfl: 1    clindamycin (CLEOCIN) 150 MG capsule, Take 150 mg by  mouth every 6 (six) hours., Disp: , Rfl:     HYDROcodone-acetaminophen (NORCO) 7.5-325 mg per tablet, Take 1 tablet by mouth every 12 (twelve) hours as needed for Pain., Disp: 40 tablet, Rfl: 0    ibuprofen (ADVIL,MOTRIN) 800 MG tablet, Take 800 mg by mouth every 6 (six) hours as needed., Disp: , Rfl:     pregabalin (LYRICA) 75 MG capsule, Take 1 capsule (75 mg total) by mouth 2 (two) times daily., Disp: 60 capsule, Rfl: 1    Past Medical History:   Diagnosis Date    Anxiety     Asthma     childhood    Candidiasis of skin and nails     Hyperglycemia     Hyperlipidemia     Hypertension     Laceration     Neck pain     Rectal/anal hemorrhage     Unspecified adverse effect of unspecified drug, medicinal and biological substance        Past Surgical History:   Procedure Laterality Date    CERVICAL FUSION  07/2000    C6-7    COLONOSCOPY N/A 1/17/2017    Procedure: COLONOSCOPY;  Surgeon: Carmine Harrell Jr., MD;  Location: [facility];  Service: Endoscopy;  Laterality: N/A;    FUSION OF POSTERIOR COLUMN OF CERVICAL SPINE USING COMPUTER AIDED NAVIGATION N/A 4/29/2022    Procedure: FUSION, SPINE, POSTERIOR SPINAL COLUMN, CERVICAL,  COMPUTER-ASSISTED NAVIGATION - C3-4 C4-5 C5-6 C6-7 T1;  Surgeon: Miguel Mercado MD;  Location: [facility] OR;  Service: Neurosurgery;  Laterality: N/A;    KYPHOSIS SURGERY  03/19/2013    POSTERIOR CERVICAL LAMINECTOMY  4/29/2022    Procedure: LAMINECTOMY, SPINE, CERVICAL, POSTERIOR APPROACH;  Surgeon: Miguel Mercado MD;  Location: [facility] OR;  Service: Neurosurgery;;  C3-T1       Family History   Problem Relation Age of Onset    Thyroid disease Mother     Cancer Mother         lung    Hyperlipidemia Father     Heart disease Father        Social History     Socioeconomic History    Marital status:    Tobacco Use    Smoking status: Never    Smokeless tobacco: Never   Substance and Sexual Activity    Alcohol use: Yes     Alcohol/week: 8.0 standard drinks     Types: 2 Cans of beer, 6 Glasses of  "wine per week     Comment: less than1 bottle of wine per week now pt reports    Drug use: No    Sexual activity: Yes     Partners: Female       Review of patient's allergies indicates:   Allergen Reactions    Percocet [oxycodone-acetaminophen] Itching    Penicillins Rash       Review of Systems:  General ROS: negative for - fever  Psychological ROS: negative for - hostility  Hematological and Lymphatic ROS: negative for - bleeding problems  Endocrine ROS: negative for - unexpected weight changes  Respiratory ROS: no cough, shortness of breath, or wheezing  Cardiovascular ROS: no chest pain or dyspnea on exertion  Gastrointestinal ROS: no abdominal pain, change in bowel habits, or black or bloody stools  Musculoskeletal ROS: positive for - muscular weakness  Neurological ROS: positive for - numbness/tingling  negative for - bowel and bladder control changes  Dermatological ROS: negative for rash    Physical Exam:  Vitals:    10/07/22 1500   BP: (!) 215/91   Pulse: 68   SpO2: 98%   Weight: 121.4 kg (267 lb 10.2 oz)   Height: 5' 8" (1.727 m)   PainSc:   8   PainLoc: Shoulder       Body mass index is 40.69 kg/m².    Gen: NAD  Gait: gait intact  Psych:  Mood appropriate for given condition  HEENT: eyes anicteric   GI: Abd soft  CV: RRR  Lungs: breathing unlabored   ROM: limited AROM of the L spine in all planes, full ROM at ankles, knees and hips  Sensation: intact to light touch in all dermatomes tested from L2-S1 bilaterally  Reflexes: 2+ b/l patella and achilles, biceps and triceps, plantar response down going   Palpation: Diffusely tender over lumbar paraspinals  -TTP over the b/l greater trochanters and bilateral SI joint  Tone: normal in the b/l knees and hips   Skin: intact  Extremities: No edema in b/l ankles or hands  Provacative tests:        Right Left   L2/3 Iliacus Hip flexion  5  5   L3/4 Qudratus Femoris Knee Extension  5  5   L4/5 Tib Anterior Ankle Dorsiflexion   5  5   L5/S1 Extensor Hallicus Longus " Great toe extension  5  5                 S1/S2 Gastroc/Soleus Plantar Flexion  5  5       Imaging:  MRI cervical spine 8/9/22  FINDINGS:  Vertebral column: As seen on comparison plain films, there are postsurgical changes with posterior instrumented fusion and decompression.  Bilateral lateral mass screws are present at the C3, C4, C5 and C6 levels.  There are bilateral pedicle screws at the T1 level.  Hardware does result in some degree of susceptibility artifact.  There has been wide posterior decompression from C3 through C7.  There has been previous solid bony fusion of C6 and C7.  There is marked disc space narrowing at the C4-5 and C5-6 levels with at least moderate disc space narrowing at the C3-4 level.  There is stable mild anterolisthesis of C7 on T1.  The odontoid process is intact.     Spinal canal, cord, epidural space: The spinal canal appears to be developmentally normal.  The cord is normal in caliber and signal intensity.  Previously demonstrated multilevel spinal stenosis has been relieved.     Findings by level:     C2-3: There is bilateral facet joint arthropathy with only minimal bulging of the annulus.  There is no spinal stenosis.  There is however moderate right and mild left foraminal stenosis without significant change.  C3-4: There is disc space narrowing, left greater than right uncovertebral spurring, right greater than left facet joint arthropathy.  There is a broad disc osteophyte complex.  There has been posterior decompression.  Therefore, there is no spinal stenosis.  There is at least moderate bilateral foraminal stenosis.  C4-5: There is marked disc space narrowing.  There is bilateral uncovertebral spurring and facet joint arthropathy.  There is a broad disc osteophyte complex.  Previously demonstrated spinal stenosis has been relieved secondary to posterior decompression.  There is moderate to severe bilateral foraminal stenosis without significant change.  C5-6: There is  marked disc space narrowing.  There is bilateral uncovertebral spurring and left greater than right facet joint arthropathy.  There is no spinal stenosis status post decompression.  There is marked bilateral foraminal stenosis without significant change.  C6-7: There is solid bony fusion of C6 and C7.  There is right greater than left uncovertebral spurring.  There is no spinal stenosis.  There is marked right and at least moderate left foraminal stenosis without change.  C7-T1: There is stable anterolisthesis of C7 on T1.  There has been wide posterior decompression.  There is no longer spinal stenosis at this level.  There is facet joint arthropathy.  There is unroofing of a disc bulge.  There is at least moderate bilateral foraminal stenosis.  T1-2: There is marked left facet joint arthropathy contributing to moderate to severe left foraminal stenosis.     Soft tissues, other: There is a well-defined fluid collection within the posterior soft tissues, centered at the level of C7 and T1.  This measures approximately 1.6 x 5.1 x 5.2 cm (AP, transverse, craniocaudad).  This likely represents a postoperative seroma/hematoma.  There is disruption in the posterior spinous soft tissue secondary to multilevel posterior decompression.    MRI thoracic spine 8/9/22  Impression:  1. There is a moderate disc protrusion at the T8-9 level which results in minimal flattening of the ventral cord surface.  There is no cord edema or myelomalacia but there is mild-to-moderate spinal stenosis.  2. There is some degree of foraminal narrowing present at several levels mostly due to facet joint arthropathy.  These findings are discussed in detail by level above.  3. There is a remote superior endplate compression deformity of T12 which has undergone kyphoplasty.  There is no acute fracture.    U/S RUE 3/4/22  Impression:  No thrombus in central veins of the right upper extremity.    MRI lumbar spine 8/4/22  FINDINGS:  Alignment: Mild  levoconvex curvature of the lumbar spine.  Minimal retrolisthesis of L3 on L4 and mild grade 1 anterolisthesis of L4 on L5.  Lordosis is maintained.     Vertebral column: Moderate T12 superior endplate compression fracture status post kyphoplasty.  No associated marrow edema or significant retropulsion.  Remaining lumbar vertebral body heights are maintained without evidence of acute fracture or aggressive marrow placement process.  Multilevel disc degeneration, most pronounced at L5-S1 with moderate disc space narrowing.  Hypertrophied L5 left transverse process which pseudo articulates with the left sacral ala.     Cord: Normal.  Conus terminates at L1.     Degenerative findings:     T11-12 (limited sagittal): Posterior disc osteophyte complex and facet arthropathy, contributing to moderate right and mild left neural foraminal stenosis.  No significant spinal canal stenosis.  T12-L1: Mild diffuse disc bulge.  Mild bilateral facet arthropathy and ligamentum flavum thickening.  Mild bilateral neural foraminal stenosis.  L1-L2: Moderate disc space narrowing.  Left asymmetric diffuse disc bulge with osteophytic ridging.  Mild bilateral facet arthropathy and ligamentum flavum thickening.  Moderate left and mild right neural foraminal stenosis.  Mild spinal canal stenosis.  L2-L3: Left asymmetric diffuse disc bulge with osteophytic ridging.  Moderate bilateral facet arthropathy.  Ligamentum flavum thickening.  Moderate left and mild-to-moderate right neural foraminal stenosis.  Moderate spinal canal stenosis.  L3-L4: Diffuse disc bulge with osteophytic ridging.  Moderate bilateral facet arthropathy and ligamentum flavum thickening.  Moderate bilateral neural foraminal stenosis.  Moderate spinal canal stenosis.  L4-L5: Anterolisthesis uncovering the disc.  Right asymmetric diffuse disc bulge.  Severe bilateral facet arthropathy.  Ligamentum flavum thickening.  Moderate bilateral neural foraminal stenosis.  Severe spinal  canal stenosis.  L5-S1: Mild diffuse disc bulge.  Moderate right and mild left facet arthropathy.  Mild right neural foraminal stenosis.  There is no spinal canal stenosis.     Paraspinal muscles & soft tissues: No significant abnormalities.    Labs:  BMP  Lab Results   Component Value Date     06/20/2022    K 3.9 06/20/2022     06/20/2022    CO2 25 06/20/2022    BUN 9 06/20/2022    CREATININE 0.78 06/20/2022    CALCIUM 9.2 06/20/2022    ANIONGAP 9 06/20/2022    ESTGFRAFRICA >60 06/20/2022    EGFRNONAA >60 06/20/2022     Lab Results   Component Value Date    ALT 11 06/20/2022    AST 31 06/20/2022    ALKPHOS 93 06/20/2022    BILITOT 0.9 06/20/2022       Assessment:   Problem List Items Addressed This Visit          Neuro    S/P cervical spinal fusion     Other Visit Diagnoses       Lumbar radiculopathy    -  Primary    Chronic pain syndrome        Relevant Medications    HYDROcodone-acetaminophen (NORCO) 7.5-325 mg per tablet    pregabalin (LYRICA) 75 MG capsule            62 y.o. year old male with PMH hypertension who complains of neck pain.  He has been having worsening neck pain for the last month.  Today his pain is 8/10, constant, burning, numb, tingling with radiation down both arms to both hands.  He reports numbness and weakness.  He denies any bowel bladder control problems.  His pain is worse with laying, coughing sneezing, lifting, nighttime.  History of C6-7 bony fusion in 2000.    10/7/22 - Mr. Solis returns to the office for follow up.  Since I last saw him he is s/p laminectomy at C3-T1 on 4/29/22 with Dr. Mercado.  He reports much improvement following surgery as he is now a lot more mobile and has a lot more  strength than prior to surgery.  He reports he continues to have neck pain radiating in his shoulders he also reports to have continue tingling into the 3rd 4th and 5th digits in the bilateral hands.  He also complains of lower back pain that radiates down both legs into his  feet.    - on exam he has full strength of his lower extremities  - I independently reviewed his cervical MRI and he is adequately decompressed following his surgery.  I also independently reviewed his thoracic MRI and there is no significant central foraminal narrowing  - I independently reviewed his lumbar MRI and at L4-5 he has severe central canal narrowing that I think is responsible for the pain that he feels that is radiating down the legs  - over the past 8 weeks he has completed formal physical therapy but continues to have back and leg pain that is limiting his mobility and interfering with his ADLs  - we will schedule for an L5-S1 DANNY  - regarding his continued neck and shoulder pain I have agreed to prescribe him some hydrocodone 7.5/325 mg to use once or twice a day as needed for severe pain.  He is prescribed him by his PCP for anxiety and reports that he uses this sparingly.  Understands that he will not take these medications on the same day and understands the interactions between opioids and benzodiazepines.  - I have also prescribed him Lyrica 75 mg to use twice a day for the neuropathic component of his pain  - follow-up 2 weeks post injection    : Not applicable    Pipo Santos M.D.  Interventional Pain Medicine / Anesthesiology    This note was completed with dictation software and grammatical errors may exist.

## 2022-10-07 NOTE — PROGRESS NOTES
[facility] Follow Evaluation    Referred by: Donna Angeles NP  Reason for referral: neck pain    CC:   Chief Complaint   Patient presents with    Neck Pain        Last 3 PDI Scores 3/8/2022   Pain Disability Index (PDI) 46       Interval HPI 10/7/22: Mr. Solis returns to the office for follow up.  Since I last saw him he is s/p laminectomy at C3-T1 on 4/29/22 with Dr. Mercado.  He reports much improvement following surgery as he is now a lot more mobile and has a lot more  strength than prior to surgery.  He reports he continues to have neck pain radiating in his shoulders he also reports to have continue tingling into the 3rd 4th and 5th digits in the bilateral hands.  He also complains of lower back pain that radiates down both legs into his feet.    HPI:   Oc Solis is a 62 y.o. male who complains of neck pain.  He has been having worsening neck pain for the last month.  Today his pain is 8/10, constant, burning, numb, tingling with radiation down both arms to both hands.  He reports numbness and weakness.  He denies any bowel bladder control problems.  His pain is worse with laying, coughing sneezing, lifting, nighttime.    History:    Current Outpatient Medications:     diazePAM (VALIUM) 5 MG tablet, Take 1 tablet (5 mg total) by mouth nightly as needed for Anxiety., Disp: 30 tablet, Rfl: 0    lancets Misc, To check BG one times daily, to use with insurance preferred meter, Disp: 50 each, Rfl: 9    losartan (COZAAR) 50 MG tablet, Take 1 tablet (50 mg total) by mouth once daily., Disp: 90 tablet, Rfl: 3    metoprolol tartrate (LOPRESSOR) 25 MG tablet, TAKE 1 TABLET BY MOUTH THREE TIMES A DAY, Disp: 90 tablet, Rfl: 1    rosuvastatin (CRESTOR) 5 MG tablet, Take 1 tablet (5 mg total) by mouth once daily., Disp: 90 tablet, Rfl: 3    tiZANidine (ZANAFLEX) 4 MG tablet, TAKE 1 TABLET BY MOUTH EVERY 6 HOURS AS NEEDED, Disp: 30 tablet, Rfl: 1    clindamycin (CLEOCIN) 150 MG capsule, Take 150 mg by  mouth every 6 (six) hours., Disp: , Rfl:     HYDROcodone-acetaminophen (NORCO) 7.5-325 mg per tablet, Take 1 tablet by mouth every 12 (twelve) hours as needed for Pain., Disp: 40 tablet, Rfl: 0    ibuprofen (ADVIL,MOTRIN) 800 MG tablet, Take 800 mg by mouth every 6 (six) hours as needed., Disp: , Rfl:     pregabalin (LYRICA) 75 MG capsule, Take 1 capsule (75 mg total) by mouth 2 (two) times daily., Disp: 60 capsule, Rfl: 1    Past Medical History:   Diagnosis Date    Anxiety     Asthma     childhood    Candidiasis of skin and nails     Hyperglycemia     Hyperlipidemia     Hypertension     Laceration     Neck pain     Rectal/anal hemorrhage     Unspecified adverse effect of unspecified drug, medicinal and biological substance        Past Surgical History:   Procedure Laterality Date    CERVICAL FUSION  07/2000    C6-7    COLONOSCOPY N/A 1/17/2017    Procedure: COLONOSCOPY;  Surgeon: Carmine Harrell Jr., MD;  Location: [facility];  Service: Endoscopy;  Laterality: N/A;    FUSION OF POSTERIOR COLUMN OF CERVICAL SPINE USING COMPUTER AIDED NAVIGATION N/A 4/29/2022    Procedure: FUSION, SPINE, POSTERIOR SPINAL COLUMN, CERVICAL,  COMPUTER-ASSISTED NAVIGATION - C3-4 C4-5 C5-6 C6-7 T1;  Surgeon: Miguel Mercado MD;  Location: [facility] OR;  Service: Neurosurgery;  Laterality: N/A;    KYPHOSIS SURGERY  03/19/2013    POSTERIOR CERVICAL LAMINECTOMY  4/29/2022    Procedure: LAMINECTOMY, SPINE, CERVICAL, POSTERIOR APPROACH;  Surgeon: Miguel Mercado MD;  Location: [facility] OR;  Service: Neurosurgery;;  C3-T1       Family History   Problem Relation Age of Onset    Thyroid disease Mother     Cancer Mother         lung    Hyperlipidemia Father     Heart disease Father        Social History     Socioeconomic History    Marital status:    Tobacco Use    Smoking status: Never    Smokeless tobacco: Never   Substance and Sexual Activity    Alcohol use: Yes     Alcohol/week: 8.0 standard drinks     Types: 2 Cans of beer, 6 Glasses of  "wine per week     Comment: less than1 bottle of wine per week now pt reports    Drug use: No    Sexual activity: Yes     Partners: Female       Review of patient's allergies indicates:   Allergen Reactions    Percocet [oxycodone-acetaminophen] Itching    Penicillins Rash       Review of Systems:  General ROS: negative for - fever  Psychological ROS: negative for - hostility  Hematological and Lymphatic ROS: negative for - bleeding problems  Endocrine ROS: negative for - unexpected weight changes  Respiratory ROS: no cough, shortness of breath, or wheezing  Cardiovascular ROS: no chest pain or dyspnea on exertion  Gastrointestinal ROS: no abdominal pain, change in bowel habits, or black or bloody stools  Musculoskeletal ROS: positive for - muscular weakness  Neurological ROS: positive for - numbness/tingling  negative for - bowel and bladder control changes  Dermatological ROS: negative for rash    Physical Exam:  Vitals:    10/07/22 1500   BP: (!) 215/91   Pulse: 68   SpO2: 98%   Weight: 121.4 kg (267 lb 10.2 oz)   Height: 5' 8" (1.727 m)   PainSc:   8   PainLoc: Shoulder       Body mass index is 40.69 kg/m².    Gen: NAD  Gait: gait intact  Psych:  Mood appropriate for given condition  HEENT: eyes anicteric   GI: Abd soft  CV: RRR  Lungs: breathing unlabored   ROM: limited AROM of the L spine in all planes, full ROM at ankles, knees and hips  Sensation: intact to light touch in all dermatomes tested from L2-S1 bilaterally  Reflexes: 2+ b/l patella and achilles, biceps and triceps, plantar response down going   Palpation: Diffusely tender over lumbar paraspinals  -TTP over the b/l greater trochanters and bilateral SI joint  Tone: normal in the b/l knees and hips   Skin: intact  Extremities: No edema in b/l ankles or hands  Provacative tests:        Right Left   L2/3 Iliacus Hip flexion  5  5   L3/4 Qudratus Femoris Knee Extension  5  5   L4/5 Tib Anterior Ankle Dorsiflexion   5  5   L5/S1 Extensor Hallicus Longus " Great toe extension  5  5                 S1/S2 Gastroc/Soleus Plantar Flexion  5  5       Imaging:  MRI cervical spine 8/9/22  FINDINGS:  Vertebral column: As seen on comparison plain films, there are postsurgical changes with posterior instrumented fusion and decompression.  Bilateral lateral mass screws are present at the C3, C4, C5 and C6 levels.  There are bilateral pedicle screws at the T1 level.  Hardware does result in some degree of susceptibility artifact.  There has been wide posterior decompression from C3 through C7.  There has been previous solid bony fusion of C6 and C7.  There is marked disc space narrowing at the C4-5 and C5-6 levels with at least moderate disc space narrowing at the C3-4 level.  There is stable mild anterolisthesis of C7 on T1.  The odontoid process is intact.     Spinal canal, cord, epidural space: The spinal canal appears to be developmentally normal.  The cord is normal in caliber and signal intensity.  Previously demonstrated multilevel spinal stenosis has been relieved.     Findings by level:     C2-3: There is bilateral facet joint arthropathy with only minimal bulging of the annulus.  There is no spinal stenosis.  There is however moderate right and mild left foraminal stenosis without significant change.  C3-4: There is disc space narrowing, left greater than right uncovertebral spurring, right greater than left facet joint arthropathy.  There is a broad disc osteophyte complex.  There has been posterior decompression.  Therefore, there is no spinal stenosis.  There is at least moderate bilateral foraminal stenosis.  C4-5: There is marked disc space narrowing.  There is bilateral uncovertebral spurring and facet joint arthropathy.  There is a broad disc osteophyte complex.  Previously demonstrated spinal stenosis has been relieved secondary to posterior decompression.  There is moderate to severe bilateral foraminal stenosis without significant change.  C5-6: There is  marked disc space narrowing.  There is bilateral uncovertebral spurring and left greater than right facet joint arthropathy.  There is no spinal stenosis status post decompression.  There is marked bilateral foraminal stenosis without significant change.  C6-7: There is solid bony fusion of C6 and C7.  There is right greater than left uncovertebral spurring.  There is no spinal stenosis.  There is marked right and at least moderate left foraminal stenosis without change.  C7-T1: There is stable anterolisthesis of C7 on T1.  There has been wide posterior decompression.  There is no longer spinal stenosis at this level.  There is facet joint arthropathy.  There is unroofing of a disc bulge.  There is at least moderate bilateral foraminal stenosis.  T1-2: There is marked left facet joint arthropathy contributing to moderate to severe left foraminal stenosis.     Soft tissues, other: There is a well-defined fluid collection within the posterior soft tissues, centered at the level of C7 and T1.  This measures approximately 1.6 x 5.1 x 5.2 cm (AP, transverse, craniocaudad).  This likely represents a postoperative seroma/hematoma.  There is disruption in the posterior spinous soft tissue secondary to multilevel posterior decompression.    MRI thoracic spine 8/9/22  Impression:  1. There is a moderate disc protrusion at the T8-9 level which results in minimal flattening of the ventral cord surface.  There is no cord edema or myelomalacia but there is mild-to-moderate spinal stenosis.  2. There is some degree of foraminal narrowing present at several levels mostly due to facet joint arthropathy.  These findings are discussed in detail by level above.  3. There is a remote superior endplate compression deformity of T12 which has undergone kyphoplasty.  There is no acute fracture.    U/S RUE 3/4/22  Impression:  No thrombus in central veins of the right upper extremity.    MRI lumbar spine 8/4/22  FINDINGS:  Alignment: Mild  levoconvex curvature of the lumbar spine.  Minimal retrolisthesis of L3 on L4 and mild grade 1 anterolisthesis of L4 on L5.  Lordosis is maintained.     Vertebral column: Moderate T12 superior endplate compression fracture status post kyphoplasty.  No associated marrow edema or significant retropulsion.  Remaining lumbar vertebral body heights are maintained without evidence of acute fracture or aggressive marrow placement process.  Multilevel disc degeneration, most pronounced at L5-S1 with moderate disc space narrowing.  Hypertrophied L5 left transverse process which pseudo articulates with the left sacral ala.     Cord: Normal.  Conus terminates at L1.     Degenerative findings:     T11-12 (limited sagittal): Posterior disc osteophyte complex and facet arthropathy, contributing to moderate right and mild left neural foraminal stenosis.  No significant spinal canal stenosis.  T12-L1: Mild diffuse disc bulge.  Mild bilateral facet arthropathy and ligamentum flavum thickening.  Mild bilateral neural foraminal stenosis.  L1-L2: Moderate disc space narrowing.  Left asymmetric diffuse disc bulge with osteophytic ridging.  Mild bilateral facet arthropathy and ligamentum flavum thickening.  Moderate left and mild right neural foraminal stenosis.  Mild spinal canal stenosis.  L2-L3: Left asymmetric diffuse disc bulge with osteophytic ridging.  Moderate bilateral facet arthropathy.  Ligamentum flavum thickening.  Moderate left and mild-to-moderate right neural foraminal stenosis.  Moderate spinal canal stenosis.  L3-L4: Diffuse disc bulge with osteophytic ridging.  Moderate bilateral facet arthropathy and ligamentum flavum thickening.  Moderate bilateral neural foraminal stenosis.  Moderate spinal canal stenosis.  L4-L5: Anterolisthesis uncovering the disc.  Right asymmetric diffuse disc bulge.  Severe bilateral facet arthropathy.  Ligamentum flavum thickening.  Moderate bilateral neural foraminal stenosis.  Severe spinal  canal stenosis.  L5-S1: Mild diffuse disc bulge.  Moderate right and mild left facet arthropathy.  Mild right neural foraminal stenosis.  There is no spinal canal stenosis.     Paraspinal muscles & soft tissues: No significant abnormalities.    Labs:  BMP  Lab Results   Component Value Date     06/20/2022    K 3.9 06/20/2022     06/20/2022    CO2 25 06/20/2022    BUN 9 06/20/2022    CREATININE 0.78 06/20/2022    CALCIUM 9.2 06/20/2022    ANIONGAP 9 06/20/2022    ESTGFRAFRICA >60 06/20/2022    EGFRNONAA >60 06/20/2022     Lab Results   Component Value Date    ALT 11 06/20/2022    AST 31 06/20/2022    ALKPHOS 93 06/20/2022    BILITOT 0.9 06/20/2022       Assessment:   Problem List Items Addressed This Visit          Neuro    S/P cervical spinal fusion     Other Visit Diagnoses       Lumbar radiculopathy    -  Primary    Chronic pain syndrome        Relevant Medications    HYDROcodone-acetaminophen (NORCO) 7.5-325 mg per tablet    pregabalin (LYRICA) 75 MG capsule            62 y.o. year old male with PMH hypertension who complains of neck pain.  He has been having worsening neck pain for the last month.  Today his pain is 8/10, constant, burning, numb, tingling with radiation down both arms to both hands.  He reports numbness and weakness.  He denies any bowel bladder control problems.  His pain is worse with laying, coughing sneezing, lifting, nighttime.  History of C6-7 bony fusion in 2000.    10/7/22 - Mr. Solis returns to the office for follow up.  Since I last saw him he is s/p laminectomy at C3-T1 on 4/29/22 with Dr. Mercado.  He reports much improvement following surgery as he is now a lot more mobile and has a lot more  strength than prior to surgery.  He reports he continues to have neck pain radiating in his shoulders he also reports to have continue tingling into the 3rd 4th and 5th digits in the bilateral hands.  He also complains of lower back pain that radiates down both legs into his  feet.    - on exam he has full strength of his lower extremities  - I independently reviewed his cervical MRI and he is adequately decompressed following his surgery.  I also independently reviewed his thoracic MRI and there is no significant central foraminal narrowing  - I independently reviewed his lumbar MRI and at L4-5 he has severe central canal narrowing that I think is responsible for the pain that he feels that is radiating down the legs  - over the past 8 weeks he has completed formal physical therapy but continues to have back and leg pain that is limiting his mobility and interfering with his ADLs  - we will schedule for an L5-S1 DANNY  - regarding his continued neck and shoulder pain I have agreed to prescribe him some hydrocodone 7.5/325 mg to use once or twice a day as needed for severe pain.  He is prescribed him by his PCP for anxiety and reports that he uses this sparingly.  Understands that he will not take these medications on the same day and understands the interactions between opioids and benzodiazepines.  - I have also prescribed him Lyrica 75 mg to use twice a day for the neuropathic component of his pain  - follow-up 2 weeks post injection    : Not applicable    Pipo Santos M.D.  Interventional Pain Medicine / Anesthesiology    This note was completed with dictation software and grammatical errors may exist.

## 2022-10-19 ENCOUNTER — HOSPITAL ENCOUNTER (OUTPATIENT)
Facility: HOSPITAL | Age: 62
Discharge: HOME | End: 2022-10-19
Attending: ANESTHESIOLOGY | Admitting: ANESTHESIOLOGY
Payer: COMMERCIAL

## 2022-10-19 ENCOUNTER — HOSPITAL ENCOUNTER (OUTPATIENT)
Dept: RADIOLOGY | Facility: HOSPITAL | Age: 62
Discharge: HOME | End: 2022-10-19
Attending: ANESTHESIOLOGY
Payer: COMMERCIAL

## 2022-10-19 VITALS
HEART RATE: 24 BPM | SYSTOLIC BLOOD PRESSURE: 137 MMHG | HEIGHT: 68 IN | TEMPERATURE: 98 F | DIASTOLIC BLOOD PRESSURE: 73 MMHG | OXYGEN SATURATION: 96 % | RESPIRATION RATE: 18 BRPM | WEIGHT: 265 LBS | BODY MASS INDEX: 40.16 KG/M2

## 2022-10-19 DIAGNOSIS — M54.50 LOWER BACK PAIN: ICD-10-CM

## 2022-10-19 DIAGNOSIS — M54.16 LUMBAR RADICULOPATHY: Primary | ICD-10-CM

## 2022-10-19 LAB
ERYTHROCYTE [DISTWIDTH] IN BLOOD BY AUTOMATED COUNT: 13.5 % (ref 11.5–14.5)
HCT VFR BLD AUTO: 39 % (ref 40–54)
HGB BLD-MCNC: 13.2 G/DL (ref 14–18)
MCH RBC QN AUTO: 30.2 PG (ref 27–31)
MCHC RBC AUTO-ENTMCNC: 33.8 G/DL (ref 32–36)
MCV RBC AUTO: 89 FL (ref 82–98)
PLATELET # BLD AUTO: 148 K/UL (ref 150–450)
PMV BLD AUTO: 10.1 FL (ref 9.2–12.9)
RBC # BLD AUTO: 4.37 M/UL (ref 4.6–6.2)
WBC # BLD AUTO: 5.84 K/UL (ref 3.9–12.7)

## 2022-10-19 PROCEDURE — 25000003 PHARM REV CODE 250: Mod: PO | Performed by: ANESTHESIOLOGY

## 2022-10-19 PROCEDURE — 76000 FLUOROSCOPY <1 HR PHYS/QHP: CPT | Mod: TC,PO

## 2022-10-19 PROCEDURE — 62323 PR INJ LUMBAR/SACRAL, W/IMAGING GUIDANCE: ICD-10-PCS | Mod: ,,, | Performed by: ANESTHESIOLOGY

## 2022-10-19 PROCEDURE — 85027 COMPLETE CBC AUTOMATED: CPT | Mod: PO | Performed by: ANESTHESIOLOGY

## 2022-10-19 PROCEDURE — 62323 NJX INTERLAMINAR LMBR/SAC: CPT | Mod: ,,, | Performed by: ANESTHESIOLOGY

## 2022-10-19 PROCEDURE — 63600175 PHARM REV CODE 636 W HCPCS: Mod: PO | Performed by: ANESTHESIOLOGY

## 2022-10-19 PROCEDURE — A4216 STERILE WATER/SALINE, 10 ML: HCPCS | Mod: PO | Performed by: ANESTHESIOLOGY

## 2022-10-19 PROCEDURE — 62323 NJX INTERLAMINAR LMBR/SAC: CPT | Mod: PO | Performed by: ANESTHESIOLOGY

## 2022-10-19 PROCEDURE — 25500020 PHARM REV CODE 255: Mod: PO | Performed by: ANESTHESIOLOGY

## 2022-10-19 RX ORDER — ALPRAZOLAM 0.5 MG/1
1 TABLET, ORALLY DISINTEGRATING ORAL ONCE AS NEEDED
Status: DISCONTINUED | OUTPATIENT
Start: 2022-10-19 | End: 2022-10-19

## 2022-10-19 RX ORDER — MIDAZOLAM HYDROCHLORIDE 2 MG/2ML
INJECTION, SOLUTION INTRAMUSCULAR; INTRAVENOUS
Status: DISCONTINUED | OUTPATIENT
Start: 2022-10-19 | End: 2022-10-19 | Stop reason: HOSPADM

## 2022-10-19 RX ORDER — METHYLPREDNISOLONE ACETATE 80 MG/ML
INJECTION, SUSPENSION INTRA-ARTICULAR; INTRALESIONAL; INTRAMUSCULAR; SOFT TISSUE
Status: DISCONTINUED | OUTPATIENT
Start: 2022-10-19 | End: 2022-10-19 | Stop reason: HOSPADM

## 2022-10-19 RX ORDER — SODIUM CHLORIDE, SODIUM LACTATE, POTASSIUM CHLORIDE, CALCIUM CHLORIDE 600; 310; 30; 20 MG/100ML; MG/100ML; MG/100ML; MG/100ML
INJECTION, SOLUTION INTRAVENOUS CONTINUOUS
Status: DISCONTINUED | OUTPATIENT
Start: 2022-10-19 | End: 2022-10-19 | Stop reason: HOSPADM

## 2022-10-19 RX ORDER — SODIUM CHLORIDE 9 MG/ML
INJECTION, SOLUTION INTRAMUSCULAR; INTRAVENOUS; SUBCUTANEOUS
Status: DISCONTINUED | OUTPATIENT
Start: 2022-10-19 | End: 2022-10-19 | Stop reason: HOSPADM

## 2022-10-19 RX ADMIN — SODIUM CHLORIDE, SODIUM LACTATE, POTASSIUM CHLORIDE, AND CALCIUM CHLORIDE: .6; .31; .03; .02 INJECTION, SOLUTION INTRAVENOUS at 02:10

## 2022-10-19 NOTE — INTERVAL H&P NOTE
The patient has been examined and the H&P has been reviewed:    I concur with the findings and no changes have occurred since H&P was written.  The risks and benefits of this intervention, and alternative therapies were discussed with the patient.  The discussion of risks included infection, bleeding, need for additional procedures or surgery, nerve damage.  Questions regarding the procedure, risks, expected outcome, and possible side effects were solicited and answered to the patient's satisfaction.  Oc Solis wishes to proceed with the injection or procedure.  Written consent was obtained.    There are no hospital problems to display for this patient.

## 2022-10-19 NOTE — OP NOTE
"Procedure Note    Procedure Date: 10/19/2022    Procedure Performed:  L5/S1 lumbar interlaminar epidural steroid injection under fluoroscopy.    Indications: Patient has failed conservative therapy.      Pre-op diagnosis: Lumbar Radiculopathy    Post-op diagnosis: same    Physician: Pipo Santos MD    IV anxiolysis medications: versed 2mg    Medications injected: depomedrol 80mg, 1% Lidocaine 1ml, 2 mL sterile, preservative-free normal saline.    Local anesthetic used: 1% Lidocaine, 1 ml, 8.4% sodium bicarbonate 0.25ml    Estimated Blood Loss: none    Complications:  none    Technique:  The patient was interviewed in the holding area and Risks/Benefits were discussed, including, but not limited to, the possibility of new or different pain, bleeding or infection.   All questions were answered.  The patient understood and accepted risks.  Consent was verfied.  A time-out was taken to identify patient and procedure prior to starting the procedure. The patient was placed in the prone position on the fluoroscopy table. The area of the lumbar spine was prepped with Chloraprep and draped in a sterile manner. The L5/S1 interspace was identified and marked under AP fluoroscopy. The skin and subcutaneous tissues overlying the targeted interspace were anesthetized with 3-5 mL of 1% lidocaine using a 25G, 1.5" needle.  A 18G, 6" Tuohy epidural needle was directed toward the interspace under fluoroscopic guidance until the ligamentum flavum was engaged. From this point, a loss of resistance technique with a glass syringe and saline was used to identify entrance of the needle into the epidural space. Once loss of resistance was observed 1 mL of contrast solution was injected. An appropriate epidurogram was noted.  A 4 mL mixture consisting of saline, 1 mL 1% Lidocaine and 80 mg of depomedrol was injected slowly and without resistance.  The needle was flushed with normal saline and removed. The contrast was seen to be displaced " after injection. Patient was awake/responsive during all injections.  The patient tolerated the procedure well and was transferred to the .AC.. in stable condition.  The patient was monitored after the procedure and was given post-procedure and discharge instructions to follow at home. The patient was discharged in a stable condition.

## 2022-10-19 NOTE — DISCHARGE SUMMARY
[facility]  Discharge Note  Short Stay    Admit Date: 10/19/2022    Discharge Date: 10/19/2022    Attending Physician: Pipo Santos     Discharge Provider: Pipo Santos    Diagnoses:  There are no hospital problems to display for this patient.      Discharged Condition: Good    Final Diagnoses: Lumbar radiculopathy [M54.16]    Disposition: Home or Self Care    Hospital Course: No complications, uneventful    Outcome of Hospitalization, Treatment, Procedure, or Surgery:  Patient was admitted for outpatient interventional pain management procedure. The patient tolerated the procedure well with no complications.    Follow up/Patient Instructions:  Follow up as scheduled in Pain Management office in 2-3 weeks.  Patient has received instructions and follow up date and time.    Medications:  Continue previous medications    Discharge Procedure Orders   Notify your health care provider if you experience any of the following:  temperature >100.4     Notify your health care provider if you experience any of the following:  persistent nausea and vomiting or diarrhea     Notify your health care provider if you experience any of the following:  severe uncontrolled pain     Notify your health care provider if you experience any of the following:  redness, tenderness, or signs of infection (pain, swelling, redness, odor or green/yellow discharge around incision site)     Notify your health care provider if you experience any of the following:  difficulty breathing or increased cough     Notify your health care provider if you experience any of the following:  severe persistent headache     Notify your health care provider if you experience any of the following:  worsening rash     Notify your health care provider if you experience any of the following:  persistent dizziness, light-headedness, or visual disturbances     Notify your health care provider if you experience any of the following:  increased confusion or  weakness     Activity as tolerated

## 2022-10-29 ENCOUNTER — DOCUMENTATION ONLY (OUTPATIENT)
Dept: REHABILITATION | Facility: HOSPITAL | Age: 62
End: 2022-10-29
Payer: COMMERCIAL

## 2022-10-29 NOTE — PROGRESS NOTES
Outpatient Therapy Discharge Summary     Name: Oc Solis  Clinic Number: [id number]    Therapy Diagnosis:    Decreased range of motion of neck Yes    Weakness of both upper extremities      S/P cervical spinal fusion      Poor posture      Physician: Donna Angeles NP     Physician Orders: PT Eval and Treat   Medical Diagnosis from Referral: Z98.1 (ICD-10-CM) - S/P cervical spinal fusion  Evaluation Date: 6/22/2022      Date of Last visit: 7/21/2022   Total Visits Received: 9  Cancelled Visits: 2  No Show Visits: 0    Assessment    Goals:   Short Term Goals (3 Weeks):   1) Pt will demonstrate compliance with initial home exercise program as prescribed by physical therapist to improve independence with management of condition. - MET  2) Pt to improve active range of motion in cervical spine to 40 degrees rotation to allow for improved functional mobility. - NOT MET  3) Pt to report cervical pain of <4/10 at worst to improve tolerance to ADLs and work. - NOT MET     Long Term Goals (6 Weeks):   1. Pt to achieve <37% limitation as measured by the FOTO to demonstrate decreased disability. - NOT MET  2) Pt to improve active range of motion in cervical spine to 30 degrees side bending to allow for improved functional mobility. - NOT MET  3) Pt to report cervical pain of <2/10 at worst to improve tolerance to ADLs and work. - NOT MET  4) Pt to increase strength to at least 5/5 of muscles tested to allow for improvement in functional activities. - MET    Discharge reason: Patient has reached the maximum rehab potential for the present time    Plan   This patient is discharged from Physical Therapy      Renan Sampson, PT, DPT  10/29/2022

## 2022-11-10 ENCOUNTER — OFFICE VISIT (OUTPATIENT)
Dept: PAIN MEDICINE | Facility: CLINIC | Age: 62
End: 2022-11-10
Payer: COMMERCIAL

## 2022-11-10 VITALS
DIASTOLIC BLOOD PRESSURE: 72 MMHG | SYSTOLIC BLOOD PRESSURE: 153 MMHG | HEIGHT: 68 IN | WEIGHT: 265.63 LBS | BODY MASS INDEX: 40.26 KG/M2 | HEART RATE: 48 BPM

## 2022-11-10 DIAGNOSIS — M54.12 CERVICAL RADICULOPATHY: ICD-10-CM

## 2022-11-10 DIAGNOSIS — Z98.1 S/P CERVICAL SPINAL FUSION: ICD-10-CM

## 2022-11-10 DIAGNOSIS — M48.061 SPINAL STENOSIS OF LUMBAR REGION, UNSPECIFIED WHETHER NEUROGENIC CLAUDICATION PRESENT: ICD-10-CM

## 2022-11-10 DIAGNOSIS — M54.16 LUMBAR RADICULOPATHY: Primary | ICD-10-CM

## 2022-11-10 DIAGNOSIS — G89.4 CHRONIC PAIN SYNDROME: ICD-10-CM

## 2022-11-10 PROCEDURE — 3077F PR MOST RECENT SYSTOLIC BLOOD PRESSURE >= 140 MM HG: ICD-10-PCS | Mod: CPTII,S$GLB,,

## 2022-11-10 PROCEDURE — 3077F SYST BP >= 140 MM HG: CPT | Mod: CPTII,S$GLB,,

## 2022-11-10 PROCEDURE — 3061F PR NEG MICROALBUMINURIA RESULT DOCUMENTED/REVIEW: ICD-10-PCS | Mod: CPTII,S$GLB,,

## 2022-11-10 PROCEDURE — 99213 PR OFFICE/OUTPT VISIT, EST, LEVL III, 20-29 MIN: ICD-10-PCS | Mod: S$GLB,,,

## 2022-11-10 PROCEDURE — 3061F NEG MICROALBUMINURIA REV: CPT | Mod: CPTII,S$GLB,,

## 2022-11-10 PROCEDURE — 4010F PR ACE/ARB THEARPY RXD/TAKEN: ICD-10-PCS | Mod: CPTII,S$GLB,,

## 2022-11-10 PROCEDURE — 99999 PR PBB SHADOW E&M-EST. PATIENT-LVL III: ICD-10-PCS | Mod: PBBFAC,,,

## 2022-11-10 PROCEDURE — 99999 PR PBB SHADOW E&M-EST. PATIENT-LVL III: CPT | Mod: PBBFAC,,,

## 2022-11-10 PROCEDURE — 4010F ACE/ARB THERAPY RXD/TAKEN: CPT | Mod: CPTII,S$GLB,,

## 2022-11-10 PROCEDURE — 3066F NEPHROPATHY DOC TX: CPT | Mod: CPTII,S$GLB,,

## 2022-11-10 PROCEDURE — 3066F PR DOCUMENTATION OF TREATMENT FOR NEPHROPATHY: ICD-10-PCS | Mod: CPTII,S$GLB,,

## 2022-11-10 PROCEDURE — 3044F HG A1C LEVEL LT 7.0%: CPT | Mod: CPTII,S$GLB,,

## 2022-11-10 PROCEDURE — 3008F BODY MASS INDEX DOCD: CPT | Mod: CPTII,S$GLB,,

## 2022-11-10 PROCEDURE — 3078F DIAST BP <80 MM HG: CPT | Mod: CPTII,S$GLB,,

## 2022-11-10 PROCEDURE — 3078F PR MOST RECENT DIASTOLIC BLOOD PRESSURE < 80 MM HG: ICD-10-PCS | Mod: CPTII,S$GLB,,

## 2022-11-10 PROCEDURE — 1159F MED LIST DOCD IN RCRD: CPT | Mod: CPTII,S$GLB,,

## 2022-11-10 PROCEDURE — 1159F PR MEDICATION LIST DOCUMENTED IN MEDICAL RECORD: ICD-10-PCS | Mod: CPTII,S$GLB,,

## 2022-11-10 PROCEDURE — 99213 OFFICE O/P EST LOW 20 MIN: CPT | Mod: S$GLB,,,

## 2022-11-10 PROCEDURE — 3044F PR MOST RECENT HEMOGLOBIN A1C LEVEL <7.0%: ICD-10-PCS | Mod: CPTII,S$GLB,,

## 2022-11-10 PROCEDURE — 3008F PR BODY MASS INDEX (BMI) DOCUMENTED: ICD-10-PCS | Mod: CPTII,S$GLB,,

## 2022-11-10 NOTE — PROGRESS NOTES
[facility] Follow Evaluation    Referred by: Donna Angeles NP  Reason for referral: neck pain    CC:   Chief Complaint   Patient presents with    Low-back Pain          Last 3 PDI Scores 3/8/2022   Pain Disability Index (PDI) 46     Interval HPI 11/10/2022: Oc Solis returns to the clinic for follow up.  He is s/p L5/S1 DANNY on 10/19/2022 with 50% relief of his pain.  Today he rates his remaining back pain as a 4/10, tolerable and generally well alleviated with rest and avoidance of significant physical activities.  He reports continued numbness and tingling in his 3rd, 4th and 5th fingers on bilateral hands as well as toes in bilateral feet.  He denies any worsening numbness, weakness or any changes with his bowel or bladder function.  He continues to take hydrocodone sparingly for severe pain.  At this time his pain is tolerable and he is satisfied with the relief of the DANNY.    Pain Intervention History:  - s/p L5/S1 DANNY on 10/19/2022 with 50% relief of his pain.    HPI:   Oc Solis is a 62 y.o. male who complains of neck pain.  He has been having worsening neck pain for the last month.  Today his pain is 8/10, constant, burning, numb, tingling with radiation down both arms to both hands.  He reports numbness and weakness.  He denies any bowel bladder control problems.  His pain is worse with laying, coughing sneezing, lifting, nighttime.    History:    Current Outpatient Medications:     clindamycin (CLEOCIN) 150 MG capsule, Take 150 mg by mouth every 6 (six) hours., Disp: , Rfl:     diazePAM (VALIUM) 5 MG tablet, Take 1 tablet (5 mg total) by mouth nightly as needed for Anxiety. (Patient not taking: Reported on 10/17/2022), Disp: 30 tablet, Rfl: 0    HYDROcodone-acetaminophen (NORCO) 7.5-325 mg per tablet, Take 1 tablet by mouth every 12 (twelve) hours as needed for Pain., Disp: 40 tablet, Rfl: 0    ibuprofen (ADVIL,MOTRIN) 800 MG tablet, Take 800 mg by mouth every 6 (six) hours as needed.,  Disp: , Rfl:     lancets Misc, To check BG one times daily, to use with insurance preferred meter, Disp: 50 each, Rfl: 9    losartan (COZAAR) 50 MG tablet, Take 1 tablet (50 mg total) by mouth once daily., Disp: 90 tablet, Rfl: 3    metFORMIN (GLUCOPHAGE-XR) 500 MG ER 24hr tablet, Take 1 tablet (500 mg total) by mouth 2 (two) times daily with meals., Disp: 180 tablet, Rfl: 3    metoprolol tartrate (LOPRESSOR) 25 MG tablet, TAKE 1 TABLET BY MOUTH THREE TIMES A DAY, Disp: 90 tablet, Rfl: 1    pregabalin (LYRICA) 75 MG capsule, Take 1 capsule (75 mg total) by mouth 2 (two) times daily., Disp: 60 capsule, Rfl: 1    rosuvastatin (CRESTOR) 5 MG tablet, Take 1 tablet (5 mg total) by mouth once daily., Disp: 90 tablet, Rfl: 3    tiZANidine (ZANAFLEX) 4 MG tablet, TAKE 1 TABLET BY MOUTH EVERY 6 HOURS AS NEEDED, Disp: 30 tablet, Rfl: 1    Past Medical History:   Diagnosis Date    Anxiety     Asthma     childhood    Candidiasis of skin and nails     Hyperglycemia     Hyperlipidemia     Hypertension     Laceration     Neck pain     Rectal/anal hemorrhage     Unspecified adverse effect of unspecified drug, medicinal and biological substance        Past Surgical History:   Procedure Laterality Date    CERVICAL FUSION  07/2000    C6-7    COLONOSCOPY N/A 1/17/2017    Procedure: COLONOSCOPY;  Surgeon: Carmine Harrell Jr., MD;  Location: [facility];  Service: Endoscopy;  Laterality: N/A;    EPIDURAL STEROID INJECTION INTO LUMBAR SPINE N/A 10/19/2022    Procedure: Injection-steroid-epidural-lumbar L5/S1;  Surgeon: Pipo Santos MD;  Location[place] [facility];  Service: Pain Management;  Laterality: N/A;    FUSION OF POSTERIOR COLUMN OF CERVICAL SPINE USING COMPUTER AIDED NAVIGATION N/A 4/29/2022    Procedure: FUSION, SPINE, POSTERIOR SPINAL COLUMN, CERVICAL,  COMPUTER-ASSISTED NAVIGATION - C3-4 C4-5 C5-6 C6-7 T1;  Surgeon: Miguel Mercado MD;  Location: [facility];  Service: Neurosurgery;  Laterality: N/A;    KYPHOSIS SURGERY  03/19/2013     "POSTERIOR CERVICAL LAMINECTOMY  4/29/2022    Procedure: LAMINECTOMY, SPINE, CERVICAL, POSTERIOR APPROACH;  Surgeon: Miguel Mercado MD;  Location: [facility] OR;  Service: Neurosurgery;;  C3-T1       Family History   Problem Relation Age of Onset    Thyroid disease Mother     Cancer Mother         lung    Hyperlipidemia Father     Heart disease Father        Social History     Socioeconomic History    Marital status:    Tobacco Use    Smoking status: Never    Smokeless tobacco: Never   Substance and Sexual Activity    Alcohol use: Yes     Alcohol/week: 8.0 standard drinks     Types: 2 Cans of beer, 6 Glasses of wine per week     Comment: less than1 bottle of wine per week now pt reports    Drug use: No    Sexual activity: Yes     Partners: Female       Review of patient's allergies indicates:   Allergen Reactions    Percocet [oxycodone-acetaminophen] Itching    Penicillins Rash       Review of Systems:  General ROS: negative for - fever  Psychological ROS: negative for - hostility  Hematological and Lymphatic ROS: negative for - bleeding problems  Endocrine ROS: negative for - unexpected weight changes  Respiratory ROS: no cough, shortness of breath, or wheezing  Cardiovascular ROS: no chest pain or dyspnea on exertion  Gastrointestinal ROS: no abdominal pain, change in bowel habits, or black or bloody stools  Musculoskeletal ROS: positive for - muscular weakness  Neurological ROS: positive for - numbness/tingling  negative for - bowel and bladder control changes  Dermatological ROS: negative for rash    Physical Exam:  Vitals:    11/10/22 1108   BP: (!) 153/72   Pulse: (!) 48   Weight: 120.5 kg (265 lb 10.5 oz)   Height: 5' 8" (1.727 m)   PainSc:   4   PainLoc: Back         Body mass index is 40.39 kg/m².    Gen: NAD  Gait: gait intact  Psych:  Mood appropriate for given condition  HEENT: eyes anicteric   GI: Abd soft  CV: RRR  Lungs: breathing unlabored   ROM: limited AROM of the L spine in all planes, full " ROM at ankles, knees and hips  Sensation: intact to light touch in all dermatomes tested from L2-S1 bilaterally  Reflexes: 2+ b/l patella and achilles, biceps and triceps, plantar response down going   Palpation: Diffusely tender over lumbar paraspinals  -TTP over the b/l greater trochanters and bilateral SI joint  Tone: normal in the b/l knees and hips   Skin: intact  Extremities: No edema in b/l ankles or hands  Provacative tests:        Right Left   L2/3 Iliacus Hip flexion  5  5   L3/4 Qudratus Femoris Knee Extension  5  5   L4/5 Tib Anterior Ankle Dorsiflexion   5  5   L5/S1 Extensor Hallicus Longus Great toe extension  5  5                 S1/S2 Gastroc/Soleus Plantar Flexion  5  5       Imaging:  MRI cervical spine 8/9/22  FINDINGS:  Vertebral column: As seen on comparison plain films, there are postsurgical changes with posterior instrumented fusion and decompression.  Bilateral lateral mass screws are present at the C3, C4, C5 and C6 levels.  There are bilateral pedicle screws at the T1 level.  Hardware does result in some degree of susceptibility artifact.  There has been wide posterior decompression from C3 through C7.  There has been previous solid bony fusion of C6 and C7.  There is marked disc space narrowing at the C4-5 and C5-6 levels with at least moderate disc space narrowing at the C3-4 level.  There is stable mild anterolisthesis of C7 on T1.  The odontoid process is intact.     Spinal canal, cord, epidural space: The spinal canal appears to be developmentally normal.  The cord is normal in caliber and signal intensity.  Previously demonstrated multilevel spinal stenosis has been relieved.     Findings by level:     C2-3: There is bilateral facet joint arthropathy with only minimal bulging of the annulus.  There is no spinal stenosis.  There is however moderate right and mild left foraminal stenosis without significant change.  C3-4: There is disc space narrowing, left greater than right  uncovertebral spurring, right greater than left facet joint arthropathy.  There is a broad disc osteophyte complex.  There has been posterior decompression.  Therefore, there is no spinal stenosis.  There is at least moderate bilateral foraminal stenosis.  C4-5: There is marked disc space narrowing.  There is bilateral uncovertebral spurring and facet joint arthropathy.  There is a broad disc osteophyte complex.  Previously demonstrated spinal stenosis has been relieved secondary to posterior decompression.  There is moderate to severe bilateral foraminal stenosis without significant change.  C5-6: There is marked disc space narrowing.  There is bilateral uncovertebral spurring and left greater than right facet joint arthropathy.  There is no spinal stenosis status post decompression.  There is marked bilateral foraminal stenosis without significant change.  C6-7: There is solid bony fusion of C6 and C7.  There is right greater than left uncovertebral spurring.  There is no spinal stenosis.  There is marked right and at least moderate left foraminal stenosis without change.  C7-T1: There is stable anterolisthesis of C7 on T1.  There has been wide posterior decompression.  There is no longer spinal stenosis at this level.  There is facet joint arthropathy.  There is unroofing of a disc bulge.  There is at least moderate bilateral foraminal stenosis.  T1-2: There is marked left facet joint arthropathy contributing to moderate to severe left foraminal stenosis.     Soft tissues, other: There is a well-defined fluid collection within the posterior soft tissues, centered at the level of C7 and T1.  This measures approximately 1.6 x 5.1 x 5.2 cm (AP, transverse, craniocaudad).  This likely represents a postoperative seroma/hematoma.  There is disruption in the posterior spinous soft tissue secondary to multilevel posterior decompression.    MRI thoracic spine 8/9/22  Impression:  1. There is a moderate disc protrusion at  the T8-9 level which results in minimal flattening of the ventral cord surface.  There is no cord edema or myelomalacia but there is mild-to-moderate spinal stenosis.  2. There is some degree of foraminal narrowing present at several levels mostly due to facet joint arthropathy.  These findings are discussed in detail by level above.  3. There is a remote superior endplate compression deformity of T12 which has undergone kyphoplasty.  There is no acute fracture.    U/S RUE 3/4/22  Impression:  No thrombus in central veins of the right upper extremity.    MRI lumbar spine 8/4/22  FINDINGS:  Alignment: Mild levoconvex curvature of the lumbar spine.  Minimal retrolisthesis of L3 on L4 and mild grade 1 anterolisthesis of L4 on L5.  Lordosis is maintained.     Vertebral column: Moderate T12 superior endplate compression fracture status post kyphoplasty.  No associated marrow edema or significant retropulsion.  Remaining lumbar vertebral body heights are maintained without evidence of acute fracture or aggressive marrow placement process.  Multilevel disc degeneration, most pronounced at L5-S1 with moderate disc space narrowing.  Hypertrophied L5 left transverse process which pseudo articulates with the left sacral ala.     Cord: Normal.  Conus terminates at L1.     Degenerative findings:     T11-12 (limited sagittal): Posterior disc osteophyte complex and facet arthropathy, contributing to moderate right and mild left neural foraminal stenosis.  No significant spinal canal stenosis.  T12-L1: Mild diffuse disc bulge.  Mild bilateral facet arthropathy and ligamentum flavum thickening.  Mild bilateral neural foraminal stenosis.  L1-L2: Moderate disc space narrowing.  Left asymmetric diffuse disc bulge with osteophytic ridging.  Mild bilateral facet arthropathy and ligamentum flavum thickening.  Moderate left and mild right neural foraminal stenosis.  Mild spinal canal stenosis.  L2-L3: Left asymmetric diffuse disc bulge  with osteophytic ridging.  Moderate bilateral facet arthropathy.  Ligamentum flavum thickening.  Moderate left and mild-to-moderate right neural foraminal stenosis.  Moderate spinal canal stenosis.  L3-L4: Diffuse disc bulge with osteophytic ridging.  Moderate bilateral facet arthropathy and ligamentum flavum thickening.  Moderate bilateral neural foraminal stenosis.  Moderate spinal canal stenosis.  L4-L5: Anterolisthesis uncovering the disc.  Right asymmetric diffuse disc bulge.  Severe bilateral facet arthropathy.  Ligamentum flavum thickening.  Moderate bilateral neural foraminal stenosis.  Severe spinal canal stenosis.  L5-S1: Mild diffuse disc bulge.  Moderate right and mild left facet arthropathy.  Mild right neural foraminal stenosis.  There is no spinal canal stenosis.     Paraspinal muscles & soft tissues: No significant abnormalities.    Labs:  BMP  Lab Results   Component Value Date     06/20/2022    K 3.9 06/20/2022     06/20/2022    CO2 25 06/20/2022    BUN 9 06/20/2022    CREATININE 0.78 06/20/2022    CALCIUM 9.2 06/20/2022    ANIONGAP 9 06/20/2022    ESTGFRAFRICA >60 06/20/2022    EGFRNONAA >60 06/20/2022     Lab Results   Component Value Date    ALT 11 06/20/2022    AST 31 06/20/2022    ALKPHOS 93 06/20/2022    BILITOT 0.9 06/20/2022       Assessment:   Problem List Items Addressed This Visit          Neuro    S/P cervical spinal fusion     Other Visit Diagnoses       Lumbar radiculopathy    -  Primary    Chronic pain syndrome        Cervical radiculopathy        Spinal stenosis of lumbar region, unspecified whether neurogenic claudication present                  62 y.o. year old male with PMH hypertension who complains of neck pain.  He has been having worsening neck pain for the last month.  Today his pain is 8/10, constant, burning, numb, tingling with radiation down both arms to both hands.  He reports numbness and weakness.  He denies any bowel bladder control problems.  His pain  is worse with laying, coughing sneezing, lifting, nighttime.  History of C6-7 bony fusion in 2000.    11/10/2022: Oc Solis returns to the clinic for follow up.  He is s/p L5/S1 DANNY on 10/19/2022 with 50% relief of his pain.  Today he rates his remaining back pain as a 4/10, tolerable and generally well alleviated with rest and avoidance of significant physical activities.  He reports continued numbness and tingling in his 3rd, 4th and 5th fingers on bilateral hands as well as toes in bilateral feet.  He denies any worsening numbness, weakness or any changes with his bowel or bladder function.  He continues to take hydrocodone sparingly for severe pain.  At this time his pain is tolerable and he is satisfied with the relief of the DANNY.      - on exam he has full strength of his lower extremities  - He is s/p L5/S1 DANNY on 10/19/2022 with 50% relief of his pain.  - I independently reviewed his lumbar MRI and at L4-5 he has severe central canal narrowing  - overall he is doing well since the epidural.  He is satisfied with results in his pain is currently tolerable.  - he continues to use hydrocodone 7.5/325 very sparingly only for days when he has severe pain.  He understands not to combine this with his Valium.  - at this time we will continue to maximize conservative therapy.  - follow up as needed      : Not applicable      This note was completed with dictation software and grammatical errors may exist.

## 2023-03-02 DIAGNOSIS — G89.4 CHRONIC PAIN SYNDROME: ICD-10-CM

## 2023-03-02 RX ORDER — PREGABALIN 75 MG/1
75 CAPSULE ORAL 2 TIMES DAILY
Qty: 60 CAPSULE | Refills: 1 | Status: SHIPPED | OUTPATIENT
Start: 2023-03-02 | End: 2023-05-08 | Stop reason: SDUPTHER

## 2023-04-20 ENCOUNTER — TELEPHONE (OUTPATIENT)
Dept: OPHTHALMOLOGY | Facility: CLINIC | Age: 63
End: 2023-04-20
Payer: MEDICARE

## 2023-04-20 ENCOUNTER — OFFICE VISIT (OUTPATIENT)
Dept: PAIN MEDICINE | Facility: CLINIC | Age: 63
End: 2023-04-20
Payer: MEDICARE

## 2023-04-20 VITALS
HEIGHT: 68 IN | WEIGHT: 284.94 LBS | DIASTOLIC BLOOD PRESSURE: 65 MMHG | SYSTOLIC BLOOD PRESSURE: 144 MMHG | HEART RATE: 75 BPM | BODY MASS INDEX: 43.19 KG/M2

## 2023-04-20 DIAGNOSIS — M48.061 SPINAL STENOSIS OF LUMBAR REGION, UNSPECIFIED WHETHER NEUROGENIC CLAUDICATION PRESENT: ICD-10-CM

## 2023-04-20 DIAGNOSIS — M54.16 LUMBAR RADICULOPATHY: Primary | ICD-10-CM

## 2023-04-20 PROCEDURE — 3008F PR BODY MASS INDEX (BMI) DOCUMENTED: ICD-10-PCS | Mod: CPTII,S$GLB,, | Performed by: ANESTHESIOLOGY

## 2023-04-20 PROCEDURE — 1159F PR MEDICATION LIST DOCUMENTED IN MEDICAL RECORD: ICD-10-PCS | Mod: CPTII,S$GLB,, | Performed by: ANESTHESIOLOGY

## 2023-04-20 PROCEDURE — 99999 PR PBB SHADOW E&M-EST. PATIENT-LVL V: ICD-10-PCS | Mod: PBBFAC,,, | Performed by: ANESTHESIOLOGY

## 2023-04-20 PROCEDURE — 1101F PT FALLS ASSESS-DOCD LE1/YR: CPT | Mod: CPTII,S$GLB,, | Performed by: ANESTHESIOLOGY

## 2023-04-20 PROCEDURE — 3288F FALL RISK ASSESSMENT DOCD: CPT | Mod: CPTII,S$GLB,, | Performed by: ANESTHESIOLOGY

## 2023-04-20 PROCEDURE — 1159F MED LIST DOCD IN RCRD: CPT | Mod: CPTII,S$GLB,, | Performed by: ANESTHESIOLOGY

## 2023-04-20 PROCEDURE — 3288F PR FALLS RISK ASSESSMENT DOCUMENTED: ICD-10-PCS | Mod: CPTII,S$GLB,, | Performed by: ANESTHESIOLOGY

## 2023-04-20 PROCEDURE — 99214 OFFICE O/P EST MOD 30 MIN: CPT | Mod: S$GLB,,, | Performed by: ANESTHESIOLOGY

## 2023-04-20 PROCEDURE — 99214 PR OFFICE/OUTPT VISIT, EST, LEVL IV, 30-39 MIN: ICD-10-PCS | Mod: S$GLB,,, | Performed by: ANESTHESIOLOGY

## 2023-04-20 PROCEDURE — 3077F PR MOST RECENT SYSTOLIC BLOOD PRESSURE >= 140 MM HG: ICD-10-PCS | Mod: CPTII,S$GLB,, | Performed by: ANESTHESIOLOGY

## 2023-04-20 PROCEDURE — 1160F PR REVIEW ALL MEDS BY PRESCRIBER/CLIN PHARMACIST DOCUMENTED: ICD-10-PCS | Mod: CPTII,S$GLB,, | Performed by: ANESTHESIOLOGY

## 2023-04-20 PROCEDURE — 1101F PR PT FALLS ASSESS DOC 0-1 FALLS W/OUT INJ PAST YR: ICD-10-PCS | Mod: CPTII,S$GLB,, | Performed by: ANESTHESIOLOGY

## 2023-04-20 PROCEDURE — 3078F DIAST BP <80 MM HG: CPT | Mod: CPTII,S$GLB,, | Performed by: ANESTHESIOLOGY

## 2023-04-20 PROCEDURE — 1160F RVW MEDS BY RX/DR IN RCRD: CPT | Mod: CPTII,S$GLB,, | Performed by: ANESTHESIOLOGY

## 2023-04-20 PROCEDURE — 3078F PR MOST RECENT DIASTOLIC BLOOD PRESSURE < 80 MM HG: ICD-10-PCS | Mod: CPTII,S$GLB,, | Performed by: ANESTHESIOLOGY

## 2023-04-20 PROCEDURE — 3008F BODY MASS INDEX DOCD: CPT | Mod: CPTII,S$GLB,, | Performed by: ANESTHESIOLOGY

## 2023-04-20 PROCEDURE — 3077F SYST BP >= 140 MM HG: CPT | Mod: CPTII,S$GLB,, | Performed by: ANESTHESIOLOGY

## 2023-04-20 PROCEDURE — 99999 PR PBB SHADOW E&M-EST. PATIENT-LVL V: CPT | Mod: PBBFAC,,, | Performed by: ANESTHESIOLOGY

## 2023-04-20 RX ORDER — SODIUM CHLORIDE, SODIUM LACTATE, POTASSIUM CHLORIDE, CALCIUM CHLORIDE 600; 310; 30; 20 MG/100ML; MG/100ML; MG/100ML; MG/100ML
INJECTION, SOLUTION INTRAVENOUS CONTINUOUS
Status: CANCELLED | OUTPATIENT
Start: 2023-04-20

## 2023-04-20 NOTE — TELEPHONE ENCOUNTER
pt needs cataract surgery. [facility] has told him it's time. will reach out to his PCP. doesn't want to wait.

## 2023-04-20 NOTE — H&P (VIEW-ONLY)
[facility] Follow Evaluation    Referred by: Donna Angeles NP  Reason for referral: neck pain    CC:   Chief Complaint   Patient presents with    Back Pain     Follow up            Last 3 PDI Scores 3/8/2022   Pain Disability Index (PDI) 46         Interval HPI 4/20/2023: Oc Solis returns to the office for follow up.  Today he reports he is having gradually worsening lower back pain with pain radiating down his bilateral legs.  He denies any new numbness or weakness.  He continues to use hydrocodone sparingly on an as needed basis for severe pain.    Pain Intervention History:  - s/p L5/S1 DANNY on 10/19/2022 with 50% relief of his pain.    HPI:   Oc Solis is a 63 y.o. male who complains of neck pain.  He has been having worsening neck pain for the last month.  Today his pain is 8/10, constant, burning, numb, tingling with radiation down both arms to both hands.  He reports numbness and weakness.  He denies any bowel bladder control problems.  His pain is worse with laying, coughing sneezing, lifting, nighttime.    History:    Current Outpatient Medications:     diazePAM (VALIUM) 5 MG tablet, Take 1 tablet (5 mg total) by mouth nightly as needed for Anxiety., Disp: 30 tablet, Rfl: 0    furosemide (LASIX) 20 MG tablet, 1-2 po daily PRN, Disp: 50 tablet, Rfl: 3    HYDROcodone-acetaminophen (NORCO) 7.5-325 mg per tablet, Take 1 tablet by mouth every 12 (twelve) hours as needed for Pain., Disp: 40 tablet, Rfl: 0    lancets Misc, To check BG one times daily, to use with insurance preferred meter, Disp: 50 each, Rfl: 9    losartan (COZAAR) 50 MG tablet, Take 1 tablet (50 mg total) by mouth once daily., Disp: 90 tablet, Rfl: 3    metFORMIN (GLUCOPHAGE-XR) 500 MG ER 24hr tablet, Take 1 tablet (500 mg total) by mouth 2 (two) times daily with meals., Disp: 180 tablet, Rfl: 3    metoprolol tartrate (LOPRESSOR) 25 MG tablet, Take 1 tablet (25 mg total) by mouth 3 (three) times daily., Disp: 90 tablet, Rfl:  1    pregabalin (LYRICA) 75 MG capsule, Take 1 capsule (75 mg total) by mouth 2 (two) times daily., Disp: 60 capsule, Rfl: 1    rosuvastatin (CRESTOR) 5 MG tablet, Take 1 tablet (5 mg total) by mouth once daily., Disp: 90 tablet, Rfl: 1    tiZANidine (ZANAFLEX) 4 MG tablet, TAKE 1 TABLET BY MOUTH EVERY 6 HOURS AS NEEDED, Disp: 30 tablet, Rfl: 1    ibuprofen (ADVIL,MOTRIN) 800 MG tablet, Take 800 mg by mouth every 6 (six) hours as needed., Disp: , Rfl:     Past Medical History:   Diagnosis Date    Anxiety     Asthma     childhood    Candidiasis of skin and nails     Hyperglycemia     Hyperlipidemia     Hypertension     Laceration     Neck pain     Rectal/anal hemorrhage     Unspecified adverse effect of unspecified drug, medicinal and biological substance        Past Surgical History:   Procedure Laterality Date    CERVICAL FUSION  07/2000    C6-7    COLONOSCOPY N/A 1/17/2017    Procedure: COLONOSCOPY;  Surgeon: Carmine Harrell Jr., MD;  Location: [facility];  Service: Endoscopy;  Laterality: N/A;    EPIDURAL STEROID INJECTION INTO LUMBAR SPINE N/A 10/19/2022    Procedure: Injection-steroid-epidural-lumbar L5/S1;  Surgeon: Pipo Santos MD;  Location: [facility] OR;  Service: Pain Management;  Laterality: N/A;    FUSION OF POSTERIOR COLUMN OF CERVICAL SPINE USING COMPUTER AIDED NAVIGATION N/A 4/29/2022    Procedure: FUSION, SPINE, POSTERIOR SPINAL COLUMN, CERVICAL,  COMPUTER-ASSISTED NAVIGATION - C3-4 C4-5 C5-6 C6-7 T1;  Surgeon: Miguel Mercado MD;  Location: [facility] OR;  Service: Neurosurgery;  Laterality: N/A;    KYPHOSIS SURGERY  03/19/2013    POSTERIOR CERVICAL LAMINECTOMY  4/29/2022    Procedure: LAMINECTOMY, SPINE, CERVICAL, POSTERIOR APPROACH;  Surgeon: Migeul Mercado MD;  Location: [facility] OR;  Service: Neurosurgery;;  C3-T1       Family History   Problem Relation Age of Onset    Thyroid disease Mother     Cancer Mother         lung    Hyperlipidemia Father     Heart disease Father        Social History  "    Socioeconomic History    Marital status:    Tobacco Use    Smoking status: Never    Smokeless tobacco: Never   Substance and Sexual Activity    Alcohol use: Yes     Alcohol/week: 8.0 standard drinks     Types: 2 Cans of beer, 6 Glasses of wine per week     Comment: less than1 bottle of wine per week now pt reports    Drug use: No    Sexual activity: Yes     Partners: Female       Review of patient's allergies indicates:   Allergen Reactions    Percocet [oxycodone-acetaminophen] Itching    Penicillins Rash       Review of Systems:  General ROS: negative for - fever  Psychological ROS: negative for - hostility  Hematological and Lymphatic ROS: negative for - bleeding problems  Endocrine ROS: negative for - unexpected weight changes  Respiratory ROS: no cough, shortness of breath, or wheezing  Cardiovascular ROS: no chest pain or dyspnea on exertion  Gastrointestinal ROS: no abdominal pain, change in bowel habits, or black or bloody stools  Musculoskeletal ROS: positive for - muscular weakness  Neurological ROS: positive for - numbness/tingling  negative for - bowel and bladder control changes  Dermatological ROS: negative for rash    Physical Exam:  Vitals:    04/20/23 0946   BP: (!) 144/65   Pulse: 75   Weight: 129.2 kg (284 lb 15.1 oz)   Height: 5' 8" (1.727 m)   PainSc:   4   PainLoc: Back         Body mass index is 43.33 kg/m².    Gen: NAD  Gait: gait intact  Psych:  Mood appropriate for given condition  HEENT: eyes anicteric   GI: Abd soft  CV: RRR  Lungs: breathing unlabored   ROM: limited AROM of the L spine in all planes, full ROM at ankles, knees and hips  Sensation: intact to light touch in all dermatomes tested from L2-S1 bilaterally  Reflexes: 2+ b/l patella and achilles, biceps and triceps, plantar response down going   Palpation: Diffusely tender over lumbar paraspinals  -TTP over the b/l greater trochanters and bilateral SI joint  Tone: normal in the b/l knees and hips   Skin: " intact  Extremities: No edema in b/l ankles or hands  Provacative tests:        Right Left   L2/3 Iliacus Hip flexion  5  5   L3/4 Qudratus Femoris Knee Extension  5  5   L4/5 Tib Anterior Ankle Dorsiflexion   5  5   L5/S1 Extensor Hallicus Longus Great toe extension  5  5                 S1/S2 Gastroc/Soleus Plantar Flexion  5  5       Imaging:  MRI cervical spine 8/9/22  FINDINGS:  Vertebral column: As seen on comparison plain films, there are postsurgical changes with posterior instrumented fusion and decompression.  Bilateral lateral mass screws are present at the C3, C4, C5 and C6 levels.  There are bilateral pedicle screws at the T1 level.  Hardware does result in some degree of susceptibility artifact.  There has been wide posterior decompression from C3 through C7.  There has been previous solid bony fusion of C6 and C7.  There is marked disc space narrowing at the C4-5 and C5-6 levels with at least moderate disc space narrowing at the C3-4 level.  There is stable mild anterolisthesis of C7 on T1.  The odontoid process is intact.     Spinal canal, cord, epidural space: The spinal canal appears to be developmentally normal.  The cord is normal in caliber and signal intensity.  Previously demonstrated multilevel spinal stenosis has been relieved.     Findings by level:     C2-3: There is bilateral facet joint arthropathy with only minimal bulging of the annulus.  There is no spinal stenosis.  There is however moderate right and mild left foraminal stenosis without significant change.  C3-4: There is disc space narrowing, left greater than right uncovertebral spurring, right greater than left facet joint arthropathy.  There is a broad disc osteophyte complex.  There has been posterior decompression.  Therefore, there is no spinal stenosis.  There is at least moderate bilateral foraminal stenosis.  C4-5: There is marked disc space narrowing.  There is bilateral uncovertebral spurring and facet joint arthropathy.   There is a broad disc osteophyte complex.  Previously demonstrated spinal stenosis has been relieved secondary to posterior decompression.  There is moderate to severe bilateral foraminal stenosis without significant change.  C5-6: There is marked disc space narrowing.  There is bilateral uncovertebral spurring and left greater than right facet joint arthropathy.  There is no spinal stenosis status post decompression.  There is marked bilateral foraminal stenosis without significant change.  C6-7: There is solid bony fusion of C6 and C7.  There is right greater than left uncovertebral spurring.  There is no spinal stenosis.  There is marked right and at least moderate left foraminal stenosis without change.  C7-T1: There is stable anterolisthesis of C7 on T1.  There has been wide posterior decompression.  There is no longer spinal stenosis at this level.  There is facet joint arthropathy.  There is unroofing of a disc bulge.  There is at least moderate bilateral foraminal stenosis.  T1-2: There is marked left facet joint arthropathy contributing to moderate to severe left foraminal stenosis.     Soft tissues, other: There is a well-defined fluid collection within the posterior soft tissues, centered at the level of C7 and T1.  This measures approximately 1.6 x 5.1 x 5.2 cm (AP, transverse, craniocaudad).  This likely represents a postoperative seroma/hematoma.  There is disruption in the posterior spinous soft tissue secondary to multilevel posterior decompression.    MRI thoracic spine 8/9/22  Impression:  1. There is a moderate disc protrusion at the T8-9 level which results in minimal flattening of the ventral cord surface.  There is no cord edema or myelomalacia but there is mild-to-moderate spinal stenosis.  2. There is some degree of foraminal narrowing present at several levels mostly due to facet joint arthropathy.  These findings are discussed in detail by level above.  3. There is a remote superior  endplate compression deformity of T12 which has undergone kyphoplasty.  There is no acute fracture.    U/S RUE 3/4/22  Impression:  No thrombus in central veins of the right upper extremity.    MRI lumbar spine 8/4/22  FINDINGS:  Alignment: Mild levoconvex curvature of the lumbar spine.  Minimal retrolisthesis of L3 on L4 and mild grade 1 anterolisthesis of L4 on L5.  Lordosis is maintained.     Vertebral column: Moderate T12 superior endplate compression fracture status post kyphoplasty.  No associated marrow edema or significant retropulsion.  Remaining lumbar vertebral body heights are maintained without evidence of acute fracture or aggressive marrow placement process.  Multilevel disc degeneration, most pronounced at L5-S1 with moderate disc space narrowing.  Hypertrophied L5 left transverse process which pseudo articulates with the left sacral ala.     Cord: Normal.  Conus terminates at L1.     Degenerative findings:     T11-12 (limited sagittal): Posterior disc osteophyte complex and facet arthropathy, contributing to moderate right and mild left neural foraminal stenosis.  No significant spinal canal stenosis.  T12-L1: Mild diffuse disc bulge.  Mild bilateral facet arthropathy and ligamentum flavum thickening.  Mild bilateral neural foraminal stenosis.  L1-L2: Moderate disc space narrowing.  Left asymmetric diffuse disc bulge with osteophytic ridging.  Mild bilateral facet arthropathy and ligamentum flavum thickening.  Moderate left and mild right neural foraminal stenosis.  Mild spinal canal stenosis.  L2-L3: Left asymmetric diffuse disc bulge with osteophytic ridging.  Moderate bilateral facet arthropathy.  Ligamentum flavum thickening.  Moderate left and mild-to-moderate right neural foraminal stenosis.  Moderate spinal canal stenosis.  L3-L4: Diffuse disc bulge with osteophytic ridging.  Moderate bilateral facet arthropathy and ligamentum flavum thickening.  Moderate bilateral neural foraminal stenosis.   Moderate spinal canal stenosis.  L4-L5: Anterolisthesis uncovering the disc.  Right asymmetric diffuse disc bulge.  Severe bilateral facet arthropathy.  Ligamentum flavum thickening.  Moderate bilateral neural foraminal stenosis.  Severe spinal canal stenosis.  L5-S1: Mild diffuse disc bulge.  Moderate right and mild left facet arthropathy.  Mild right neural foraminal stenosis.  There is no spinal canal stenosis.     Paraspinal muscles & soft tissues: No significant abnormalities.    Labs:  BMP  Lab Results   Component Value Date     03/30/2023    K 3.8 03/30/2023     03/30/2023    CO2 26 03/30/2023    BUN 9 03/30/2023    CREATININE 0.83 03/30/2023    CALCIUM 9.0 03/30/2023    ANIONGAP 11 03/30/2023    ESTGFRAFRICA >60 06/20/2022    EGFRNONAA >60 06/20/2022     Lab Results   Component Value Date    ALT 21 03/30/2023    AST 40 03/30/2023    ALKPHOS 78 03/30/2023    BILITOT 1.3 03/30/2023       Assessment:   Problem List Items Addressed This Visit    None  Visit Diagnoses       Lumbar radiculopathy    -  Primary    Relevant Orders    Case Request Operating Room: Injection-steroid-epidural-lumbar L5/S1 (Completed)    Spinal stenosis of lumbar region, unspecified whether neurogenic claudication present                63 y.o. year old male with PMH hypertension who complains of neck pain.  He has been having worsening neck pain for the last month.  Today his pain is 8/10, constant, burning, numb, tingling with radiation down both arms to both hands.  He reports numbness and weakness.  He denies any bowel bladder control problems.  His pain is worse with laying, coughing sneezing, lifting, nighttime.  History of C6-7 bony fusion in 2000.    4/20/23 - Oc Solis returns to the office for follow up.  Today he reports he is having gradually worsening lower back pain with pain radiating down his bilateral legs.  He denies any new numbness or weakness.  He continues to use hydrocodone sparingly on an as  needed basis for severe pain.    - on exam he continued to have full strength and intact sensation to light touch in his lower extremity  - I independently reviewed his lumbar MRI with him again today and he has severe central canal narrowing at L4-5  - is status post L5-S1 DANNY on 10/19/2022 with over 50% relief of his pain lasting for over 4 months  - his pain that has returned is similar to his prior pain and is limiting his mobility and interfering with his daily living  - we will schedule repeat L5-S1 DANNY.  The risks and benefits of this intervention, and alternative therapies were discussed with the patient.  The discussion of risks included infection, bleeding, need for additional procedures or surgery, nerve damage.  Questions regarding the procedure, risks, expected outcome, and possible side effects were solicited and answered to the patient's satisfaction.  Oc Solis wishes to proceed with the injection or procedure.  Written consent was obtained.  - follow up 2-3 weeks post injection      : Not applicable      This note was completed with dictation software and grammatical errors may exist.

## 2023-04-20 NOTE — PROGRESS NOTES
[facility] Follow Evaluation    Referred by: Donna Angeles NP  Reason for referral: neck pain    CC:   Chief Complaint   Patient presents with    Back Pain     Follow up            Last 3 PDI Scores 3/8/2022   Pain Disability Index (PDI) 46         Interval HPI 4/20/2023: Oc Solis returns to the office for follow up.  Today he reports he is having gradually worsening lower back pain with pain radiating down his bilateral legs.  He denies any new numbness or weakness.  He continues to use hydrocodone sparingly on an as needed basis for severe pain.    Pain Intervention History:  - s/p L5/S1 DANNY on 10/19/2022 with 50% relief of his pain.    HPI:   Oc Solis is a 63 y.o. male who complains of neck pain.  He has been having worsening neck pain for the last month.  Today his pain is 8/10, constant, burning, numb, tingling with radiation down both arms to both hands.  He reports numbness and weakness.  He denies any bowel bladder control problems.  His pain is worse with laying, coughing sneezing, lifting, nighttime.    History:    Current Outpatient Medications:     diazePAM (VALIUM) 5 MG tablet, Take 1 tablet (5 mg total) by mouth nightly as needed for Anxiety., Disp: 30 tablet, Rfl: 0    furosemide (LASIX) 20 MG tablet, 1-2 po daily PRN, Disp: 50 tablet, Rfl: 3    HYDROcodone-acetaminophen (NORCO) 7.5-325 mg per tablet, Take 1 tablet by mouth every 12 (twelve) hours as needed for Pain., Disp: 40 tablet, Rfl: 0    lancets Misc, To check BG one times daily, to use with insurance preferred meter, Disp: 50 each, Rfl: 9    losartan (COZAAR) 50 MG tablet, Take 1 tablet (50 mg total) by mouth once daily., Disp: 90 tablet, Rfl: 3    metFORMIN (GLUCOPHAGE-XR) 500 MG ER 24hr tablet, Take 1 tablet (500 mg total) by mouth 2 (two) times daily with meals., Disp: 180 tablet, Rfl: 3    metoprolol tartrate (LOPRESSOR) 25 MG tablet, Take 1 tablet (25 mg total) by mouth 3 (three) times daily., Disp: 90 tablet, Rfl:  1    pregabalin (LYRICA) 75 MG capsule, Take 1 capsule (75 mg total) by mouth 2 (two) times daily., Disp: 60 capsule, Rfl: 1    rosuvastatin (CRESTOR) 5 MG tablet, Take 1 tablet (5 mg total) by mouth once daily., Disp: 90 tablet, Rfl: 1    tiZANidine (ZANAFLEX) 4 MG tablet, TAKE 1 TABLET BY MOUTH EVERY 6 HOURS AS NEEDED, Disp: 30 tablet, Rfl: 1    ibuprofen (ADVIL,MOTRIN) 800 MG tablet, Take 800 mg by mouth every 6 (six) hours as needed., Disp: , Rfl:     Past Medical History:   Diagnosis Date    Anxiety     Asthma     childhood    Candidiasis of skin and nails     Hyperglycemia     Hyperlipidemia     Hypertension     Laceration     Neck pain     Rectal/anal hemorrhage     Unspecified adverse effect of unspecified drug, medicinal and biological substance        Past Surgical History:   Procedure Laterality Date    CERVICAL FUSION  07/2000    C6-7    COLONOSCOPY N/A 1/17/2017    Procedure: COLONOSCOPY;  Surgeon: Carmine Harrell Jr., MD;  Location: [facility];  Service: Endoscopy;  Laterality: N/A;    EPIDURAL STEROID INJECTION INTO LUMBAR SPINE N/A 10/19/2022    Procedure: Injection-steroid-epidural-lumbar L5/S1;  Surgeon: Pipo Santos MD;  Location: [facility] OR;  Service: Pain Management;  Laterality: N/A;    FUSION OF POSTERIOR COLUMN OF CERVICAL SPINE USING COMPUTER AIDED NAVIGATION N/A 4/29/2022    Procedure: FUSION, SPINE, POSTERIOR SPINAL COLUMN, CERVICAL,  COMPUTER-ASSISTED NAVIGATION - C3-4 C4-5 C5-6 C6-7 T1;  Surgeon: Miguel Mercado MD;  Location: [facility] OR;  Service: Neurosurgery;  Laterality: N/A;    KYPHOSIS SURGERY  03/19/2013    POSTERIOR CERVICAL LAMINECTOMY  4/29/2022    Procedure: LAMINECTOMY, SPINE, CERVICAL, POSTERIOR APPROACH;  Surgeon: Miguel Mercado MD;  Location: [facility] OR;  Service: Neurosurgery;;  C3-T1       Family History   Problem Relation Age of Onset    Thyroid disease Mother     Cancer Mother         lung    Hyperlipidemia Father     Heart disease Father        Social History  "    Socioeconomic History    Marital status:    Tobacco Use    Smoking status: Never    Smokeless tobacco: Never   Substance and Sexual Activity    Alcohol use: Yes     Alcohol/week: 8.0 standard drinks     Types: 2 Cans of beer, 6 Glasses of wine per week     Comment: less than1 bottle of wine per week now pt reports    Drug use: No    Sexual activity: Yes     Partners: Female       Review of patient's allergies indicates:   Allergen Reactions    Percocet [oxycodone-acetaminophen] Itching    Penicillins Rash       Review of Systems:  General ROS: negative for - fever  Psychological ROS: negative for - hostility  Hematological and Lymphatic ROS: negative for - bleeding problems  Endocrine ROS: negative for - unexpected weight changes  Respiratory ROS: no cough, shortness of breath, or wheezing  Cardiovascular ROS: no chest pain or dyspnea on exertion  Gastrointestinal ROS: no abdominal pain, change in bowel habits, or black or bloody stools  Musculoskeletal ROS: positive for - muscular weakness  Neurological ROS: positive for - numbness/tingling  negative for - bowel and bladder control changes  Dermatological ROS: negative for rash    Physical Exam:  Vitals:    04/20/23 0946   BP: (!) 144/65   Pulse: 75   Weight: 129.2 kg (284 lb 15.1 oz)   Height: 5' 8" (1.727 m)   PainSc:   4   PainLoc: Back         Body mass index is 43.33 kg/m².    Gen: NAD  Gait: gait intact  Psych:  Mood appropriate for given condition  HEENT: eyes anicteric   GI: Abd soft  CV: RRR  Lungs: breathing unlabored   ROM: limited AROM of the L spine in all planes, full ROM at ankles, knees and hips  Sensation: intact to light touch in all dermatomes tested from L2-S1 bilaterally  Reflexes: 2+ b/l patella and achilles, biceps and triceps, plantar response down going   Palpation: Diffusely tender over lumbar paraspinals  -TTP over the b/l greater trochanters and bilateral SI joint  Tone: normal in the b/l knees and hips   Skin: " intact  Extremities: No edema in b/l ankles or hands  Provacative tests:        Right Left   L2/3 Iliacus Hip flexion  5  5   L3/4 Qudratus Femoris Knee Extension  5  5   L4/5 Tib Anterior Ankle Dorsiflexion   5  5   L5/S1 Extensor Hallicus Longus Great toe extension  5  5                 S1/S2 Gastroc/Soleus Plantar Flexion  5  5       Imaging:  MRI cervical spine 8/9/22  FINDINGS:  Vertebral column: As seen on comparison plain films, there are postsurgical changes with posterior instrumented fusion and decompression.  Bilateral lateral mass screws are present at the C3, C4, C5 and C6 levels.  There are bilateral pedicle screws at the T1 level.  Hardware does result in some degree of susceptibility artifact.  There has been wide posterior decompression from C3 through C7.  There has been previous solid bony fusion of C6 and C7.  There is marked disc space narrowing at the C4-5 and C5-6 levels with at least moderate disc space narrowing at the C3-4 level.  There is stable mild anterolisthesis of C7 on T1.  The odontoid process is intact.     Spinal canal, cord, epidural space: The spinal canal appears to be developmentally normal.  The cord is normal in caliber and signal intensity.  Previously demonstrated multilevel spinal stenosis has been relieved.     Findings by level:     C2-3: There is bilateral facet joint arthropathy with only minimal bulging of the annulus.  There is no spinal stenosis.  There is however moderate right and mild left foraminal stenosis without significant change.  C3-4: There is disc space narrowing, left greater than right uncovertebral spurring, right greater than left facet joint arthropathy.  There is a broad disc osteophyte complex.  There has been posterior decompression.  Therefore, there is no spinal stenosis.  There is at least moderate bilateral foraminal stenosis.  C4-5: There is marked disc space narrowing.  There is bilateral uncovertebral spurring and facet joint arthropathy.   There is a broad disc osteophyte complex.  Previously demonstrated spinal stenosis has been relieved secondary to posterior decompression.  There is moderate to severe bilateral foraminal stenosis without significant change.  C5-6: There is marked disc space narrowing.  There is bilateral uncovertebral spurring and left greater than right facet joint arthropathy.  There is no spinal stenosis status post decompression.  There is marked bilateral foraminal stenosis without significant change.  C6-7: There is solid bony fusion of C6 and C7.  There is right greater than left uncovertebral spurring.  There is no spinal stenosis.  There is marked right and at least moderate left foraminal stenosis without change.  C7-T1: There is stable anterolisthesis of C7 on T1.  There has been wide posterior decompression.  There is no longer spinal stenosis at this level.  There is facet joint arthropathy.  There is unroofing of a disc bulge.  There is at least moderate bilateral foraminal stenosis.  T1-2: There is marked left facet joint arthropathy contributing to moderate to severe left foraminal stenosis.     Soft tissues, other: There is a well-defined fluid collection within the posterior soft tissues, centered at the level of C7 and T1.  This measures approximately 1.6 x 5.1 x 5.2 cm (AP, transverse, craniocaudad).  This likely represents a postoperative seroma/hematoma.  There is disruption in the posterior spinous soft tissue secondary to multilevel posterior decompression.    MRI thoracic spine 8/9/22  Impression:  1. There is a moderate disc protrusion at the T8-9 level which results in minimal flattening of the ventral cord surface.  There is no cord edema or myelomalacia but there is mild-to-moderate spinal stenosis.  2. There is some degree of foraminal narrowing present at several levels mostly due to facet joint arthropathy.  These findings are discussed in detail by level above.  3. There is a remote superior  endplate compression deformity of T12 which has undergone kyphoplasty.  There is no acute fracture.    U/S RUE 3/4/22  Impression:  No thrombus in central veins of the right upper extremity.    MRI lumbar spine 8/4/22  FINDINGS:  Alignment: Mild levoconvex curvature of the lumbar spine.  Minimal retrolisthesis of L3 on L4 and mild grade 1 anterolisthesis of L4 on L5.  Lordosis is maintained.     Vertebral column: Moderate T12 superior endplate compression fracture status post kyphoplasty.  No associated marrow edema or significant retropulsion.  Remaining lumbar vertebral body heights are maintained without evidence of acute fracture or aggressive marrow placement process.  Multilevel disc degeneration, most pronounced at L5-S1 with moderate disc space narrowing.  Hypertrophied L5 left transverse process which pseudo articulates with the left sacral ala.     Cord: Normal.  Conus terminates at L1.     Degenerative findings:     T11-12 (limited sagittal): Posterior disc osteophyte complex and facet arthropathy, contributing to moderate right and mild left neural foraminal stenosis.  No significant spinal canal stenosis.  T12-L1: Mild diffuse disc bulge.  Mild bilateral facet arthropathy and ligamentum flavum thickening.  Mild bilateral neural foraminal stenosis.  L1-L2: Moderate disc space narrowing.  Left asymmetric diffuse disc bulge with osteophytic ridging.  Mild bilateral facet arthropathy and ligamentum flavum thickening.  Moderate left and mild right neural foraminal stenosis.  Mild spinal canal stenosis.  L2-L3: Left asymmetric diffuse disc bulge with osteophytic ridging.  Moderate bilateral facet arthropathy.  Ligamentum flavum thickening.  Moderate left and mild-to-moderate right neural foraminal stenosis.  Moderate spinal canal stenosis.  L3-L4: Diffuse disc bulge with osteophytic ridging.  Moderate bilateral facet arthropathy and ligamentum flavum thickening.  Moderate bilateral neural foraminal stenosis.   Moderate spinal canal stenosis.  L4-L5: Anterolisthesis uncovering the disc.  Right asymmetric diffuse disc bulge.  Severe bilateral facet arthropathy.  Ligamentum flavum thickening.  Moderate bilateral neural foraminal stenosis.  Severe spinal canal stenosis.  L5-S1: Mild diffuse disc bulge.  Moderate right and mild left facet arthropathy.  Mild right neural foraminal stenosis.  There is no spinal canal stenosis.     Paraspinal muscles & soft tissues: No significant abnormalities.    Labs:  BMP  Lab Results   Component Value Date     03/30/2023    K 3.8 03/30/2023     03/30/2023    CO2 26 03/30/2023    BUN 9 03/30/2023    CREATININE 0.83 03/30/2023    CALCIUM 9.0 03/30/2023    ANIONGAP 11 03/30/2023    ESTGFRAFRICA >60 06/20/2022    EGFRNONAA >60 06/20/2022     Lab Results   Component Value Date    ALT 21 03/30/2023    AST 40 03/30/2023    ALKPHOS 78 03/30/2023    BILITOT 1.3 03/30/2023       Assessment:   Problem List Items Addressed This Visit    None  Visit Diagnoses       Lumbar radiculopathy    -  Primary    Relevant Orders    Case Request Operating Room: Injection-steroid-epidural-lumbar L5/S1 (Completed)    Spinal stenosis of lumbar region, unspecified whether neurogenic claudication present                63 y.o. year old male with PMH hypertension who complains of neck pain.  He has been having worsening neck pain for the last month.  Today his pain is 8/10, constant, burning, numb, tingling with radiation down both arms to both hands.  He reports numbness and weakness.  He denies any bowel bladder control problems.  His pain is worse with laying, coughing sneezing, lifting, nighttime.  History of C6-7 bony fusion in 2000.    4/20/23 - Oc Solis returns to the office for follow up.  Today he reports he is having gradually worsening lower back pain with pain radiating down his bilateral legs.  He denies any new numbness or weakness.  He continues to use hydrocodone sparingly on an as  needed basis for severe pain.    - on exam he continued to have full strength and intact sensation to light touch in his lower extremity  - I independently reviewed his lumbar MRI with him again today and he has severe central canal narrowing at L4-5  - is status post L5-S1 DANNY on 10/19/2022 with over 50% relief of his pain lasting for over 4 months  - his pain that has returned is similar to his prior pain and is limiting his mobility and interfering with his daily living  - we will schedule repeat L5-S1 DANNY.  The risks and benefits of this intervention, and alternative therapies were discussed with the patient.  The discussion of risks included infection, bleeding, need for additional procedures or surgery, nerve damage.  Questions regarding the procedure, risks, expected outcome, and possible side effects were solicited and answered to the patient's satisfaction.  Oc Solis wishes to proceed with the injection or procedure.  Written consent was obtained.  - follow up 2-3 weeks post injection      : Not applicable      This note was completed with dictation software and grammatical errors may exist.

## 2023-04-24 DIAGNOSIS — G89.4 CHRONIC PAIN SYNDROME: ICD-10-CM

## 2023-04-24 RX ORDER — HYDROCODONE BITARTRATE AND ACETAMINOPHEN 7.5; 325 MG/1; MG/1
1 TABLET ORAL EVERY 12 HOURS PRN
Qty: 40 TABLET | Refills: 0 | Status: CANCELLED | OUTPATIENT
Start: 2023-04-24

## 2023-04-28 ENCOUNTER — HOSPITAL ENCOUNTER (OUTPATIENT)
Dept: RADIOLOGY | Facility: HOSPITAL | Age: 63
Discharge: HOME | End: 2023-04-28
Attending: ANESTHESIOLOGY | Admitting: ANESTHESIOLOGY
Payer: MEDICARE

## 2023-04-28 ENCOUNTER — HOSPITAL ENCOUNTER (OUTPATIENT)
Facility: HOSPITAL | Age: 63
Discharge: HOME | End: 2023-04-28
Attending: ANESTHESIOLOGY | Admitting: ANESTHESIOLOGY
Payer: MEDICARE

## 2023-04-28 DIAGNOSIS — M54.16 LUMBAR RADICULOPATHY: Primary | ICD-10-CM

## 2023-04-28 LAB — GLUCOSE SERPL-MCNC: 116 MG/DL (ref 70–110)

## 2023-04-28 PROCEDURE — 62323 NJX INTERLAMINAR LMBR/SAC: CPT | Mod: ,,, | Performed by: ANESTHESIOLOGY

## 2023-04-28 PROCEDURE — 82962 GLUCOSE BLOOD TEST: CPT | Mod: PO | Performed by: ANESTHESIOLOGY

## 2023-04-28 PROCEDURE — 63600175 PHARM REV CODE 636 W HCPCS: Mod: PO | Performed by: ANESTHESIOLOGY

## 2023-04-28 PROCEDURE — 25000003 PHARM REV CODE 250: Mod: PO | Performed by: ANESTHESIOLOGY

## 2023-04-28 PROCEDURE — 62323 PR INJ LUMBAR/SACRAL, W/IMAGING GUIDANCE: ICD-10-PCS | Mod: ,,, | Performed by: ANESTHESIOLOGY

## 2023-04-28 PROCEDURE — 76000 FLUOROSCOPY <1 HR PHYS/QHP: CPT | Mod: TC,PO

## 2023-04-28 PROCEDURE — 62323 NJX INTERLAMINAR LMBR/SAC: CPT | Mod: PO | Performed by: ANESTHESIOLOGY

## 2023-04-28 PROCEDURE — 25500020 PHARM REV CODE 255: Mod: PO | Performed by: ANESTHESIOLOGY

## 2023-04-28 RX ORDER — SODIUM CHLORIDE, SODIUM LACTATE, POTASSIUM CHLORIDE, CALCIUM CHLORIDE 600; 310; 30; 20 MG/100ML; MG/100ML; MG/100ML; MG/100ML
INJECTION, SOLUTION INTRAVENOUS CONTINUOUS
Status: DISCONTINUED | OUTPATIENT
Start: 2023-04-28 | End: 2023-04-28 | Stop reason: HOSPADM

## 2023-04-28 RX ORDER — LIDOCAINE HYDROCHLORIDE 10 MG/ML
INJECTION, SOLUTION EPIDURAL; INFILTRATION; INTRACAUDAL; PERINEURAL
Status: DISCONTINUED | OUTPATIENT
Start: 2023-04-28 | End: 2023-04-28 | Stop reason: HOSPADM

## 2023-04-28 RX ORDER — MIDAZOLAM HYDROCHLORIDE 2 MG/2ML
INJECTION, SOLUTION INTRAMUSCULAR; INTRAVENOUS
Status: DISCONTINUED | OUTPATIENT
Start: 2023-04-28 | End: 2023-04-28 | Stop reason: HOSPADM

## 2023-04-28 NOTE — OP NOTE
"Procedure Note    Procedure Date: 4/28/2023    Procedure Performed:  L5/S1 lumbar interlaminar epidural steroid injection under fluoroscopy.    Indications: Patient has failed conservative therapy.      Pre-op diagnosis: Lumbar Radiculopathy    Post-op diagnosis: same    Physician: Pipo Santos MD    IV anxiolysis medications: versed 2mg    Medications injected: depomedrol 80mg, 1% Lidocaine 1ml, 2 mL sterile, preservative-free normal saline.    Local anesthetic used: 1% Lidocaine, 1 ml    Estimated Blood Loss: none    Complications:  none    Technique:  The patient was interviewed in the holding area and Risks/Benefits were discussed, including, but not limited to, the possibility of new or different pain, bleeding or infection.   All questions were answered.  The patient understood and accepted risks.  Consent was verfied.  A time-out was taken to identify patient and procedure prior to starting the procedure. The patient was placed in the prone position on the fluoroscopy table. The area of the lumbar spine was prepped with Chloraprep x2 and draped in a sterile manner. The L5/S1 interspace was identified and marked under AP fluoroscopy. The skin and subcutaneous tissues overlying the targeted interspace were anesthetized with 3-5 mL of 1% lidocaine using a 25G, 1.5" needle.  A 18G, 6" Tuohy epidural needle was directed toward the interspace under fluoroscopic guidance until the ligamentum flavum was engaged. From this point, a loss of resistance technique with a glass syringe and saline was used to identify entrance of the needle into the epidural space. Once loss of resistance was observed 1 mL of contrast solution was injected. An appropriate epidurogram was noted.  A 4 mL mixture consisting of saline, 1 mL 1% Lidocaine and 80 mg of depomedrol was injected slowly and without resistance.  The needle was flushed with normal saline and removed. The contrast was seen to be displaced after injection. Patient was " awake/responsive during all injections.  The patient tolerated the procedure well and was transferred to the .AC.. in stable condition.  The patient was monitored after the procedure and was given post-procedure and discharge instructions to follow at home. The patient was discharged in a stable condition.

## 2023-04-28 NOTE — DISCHARGE SUMMARY
[facility]  Discharge Note  Short Stay    Admit Date: 4/28/2023    Discharge Date: 4/28/2023    Attending Physician: Pipo Santos     Discharge Provider: Pipo Santos    Diagnoses:  There are no hospital problems to display for this patient.      Discharged Condition: Good    Final Diagnoses: Lumbar radiculopathy [M54.16]    Disposition: Home or Self Care    Hospital Course: No complications, uneventful    Outcome of Hospitalization, Treatment, Procedure, or Surgery:  Patient was admitted for outpatient interventional pain management procedure. The patient tolerated the procedure well with no complications.    Follow up/Patient Instructions:  Follow up as scheduled in Pain Management office in 2-3 weeks.  Patient has received instructions and follow up date and time.    Medications:  Continue previous medications, except restart aspirin in 24 hours    Discharge Procedure Orders   Notify your health care provider if you experience any of the following:  temperature >100.4     Notify your health care provider if you experience any of the following:  persistent nausea and vomiting or diarrhea     Notify your health care provider if you experience any of the following:  severe uncontrolled pain     Notify your health care provider if you experience any of the following:  redness, tenderness, or signs of infection (pain, swelling, redness, odor or green/yellow discharge around incision site)     Notify your health care provider if you experience any of the following:  difficulty breathing or increased cough     Notify your health care provider if you experience any of the following:  severe persistent headache     Notify your health care provider if you experience any of the following:  worsening rash     Notify your health care provider if you experience any of the following:  persistent dizziness, light-headedness, or visual disturbances     Notify your health care provider if you experience any of the  following:  increased confusion or weakness     Activity as tolerated

## 2023-05-01 VITALS
OXYGEN SATURATION: 98 % | SYSTOLIC BLOOD PRESSURE: 136 MMHG | HEART RATE: 78 BPM | DIASTOLIC BLOOD PRESSURE: 66 MMHG | RESPIRATION RATE: 18 BRPM | HEIGHT: 68 IN | BODY MASS INDEX: 43.04 KG/M2 | WEIGHT: 284 LBS | TEMPERATURE: 98 F

## 2023-05-08 DIAGNOSIS — G89.4 CHRONIC PAIN SYNDROME: ICD-10-CM

## 2023-05-09 RX ORDER — PREGABALIN 75 MG/1
75 CAPSULE ORAL 2 TIMES DAILY
Qty: 60 CAPSULE | Refills: 1 | Status: SHIPPED | OUTPATIENT
Start: 2023-05-09 | End: 2023-09-19 | Stop reason: SDUPTHER

## 2023-05-18 ENCOUNTER — OFFICE VISIT (OUTPATIENT)
Dept: PAIN MEDICINE | Facility: CLINIC | Age: 63
End: 2023-05-18
Payer: MEDICARE

## 2023-05-18 VITALS
BODY MASS INDEX: 43.82 KG/M2 | HEART RATE: 59 BPM | DIASTOLIC BLOOD PRESSURE: 63 MMHG | SYSTOLIC BLOOD PRESSURE: 145 MMHG | HEIGHT: 68 IN | OXYGEN SATURATION: 96 % | WEIGHT: 289.13 LBS

## 2023-05-18 DIAGNOSIS — M54.16 LUMBAR RADICULOPATHY: Primary | ICD-10-CM

## 2023-05-18 DIAGNOSIS — M48.061 SPINAL STENOSIS OF LUMBAR REGION, UNSPECIFIED WHETHER NEUROGENIC CLAUDICATION PRESENT: ICD-10-CM

## 2023-05-18 DIAGNOSIS — Z98.1 S/P CERVICAL SPINAL FUSION: ICD-10-CM

## 2023-05-18 PROCEDURE — 99213 OFFICE O/P EST LOW 20 MIN: CPT | Mod: PN

## 2023-05-18 PROCEDURE — 99213 PR OFFICE/OUTPT VISIT, EST, LEVL III, 20-29 MIN: ICD-10-PCS | Mod: ,,,

## 2023-05-18 PROCEDURE — 3288F FALL RISK ASSESSMENT DOCD: CPT | Mod: CPTII,,,

## 2023-05-18 PROCEDURE — 3008F PR BODY MASS INDEX (BMI) DOCUMENTED: ICD-10-PCS | Mod: CPTII,,,

## 2023-05-18 PROCEDURE — 3077F PR MOST RECENT SYSTOLIC BLOOD PRESSURE >= 140 MM HG: ICD-10-PCS | Mod: CPTII,,,

## 2023-05-18 PROCEDURE — 1159F MED LIST DOCD IN RCRD: CPT | Mod: CPTII,,,

## 2023-05-18 PROCEDURE — 3078F DIAST BP <80 MM HG: CPT | Mod: CPTII,,,

## 2023-05-18 PROCEDURE — 1100F PTFALLS ASSESS-DOCD GE2>/YR: CPT | Mod: CPTII,,,

## 2023-05-18 PROCEDURE — 1159F PR MEDICATION LIST DOCUMENTED IN MEDICAL RECORD: ICD-10-PCS | Mod: CPTII,,,

## 2023-05-18 PROCEDURE — 99999 PR PBB SHADOW E&M-EST. PATIENT-LVL III: ICD-10-PCS | Mod: PBBFAC,,,

## 2023-05-18 PROCEDURE — 99999 PR PBB SHADOW E&M-EST. PATIENT-LVL III: CPT | Mod: PBBFAC,,,

## 2023-05-18 PROCEDURE — 3008F BODY MASS INDEX DOCD: CPT | Mod: CPTII,,,

## 2023-05-18 PROCEDURE — 1100F PR PT FALLS ASSESS DOC 2+ FALLS/FALL W/INJURY/YR: ICD-10-PCS | Mod: CPTII,,,

## 2023-05-18 PROCEDURE — 3078F PR MOST RECENT DIASTOLIC BLOOD PRESSURE < 80 MM HG: ICD-10-PCS | Mod: CPTII,,,

## 2023-05-18 PROCEDURE — 3077F SYST BP >= 140 MM HG: CPT | Mod: CPTII,,,

## 2023-05-18 PROCEDURE — 99213 OFFICE O/P EST LOW 20 MIN: CPT | Mod: ,,,

## 2023-05-18 PROCEDURE — 3288F PR FALLS RISK ASSESSMENT DOCUMENTED: ICD-10-PCS | Mod: CPTII,,,

## 2023-05-18 NOTE — PROGRESS NOTES
[facility] Follow Evaluation    Referred by: Donna Angeles NP  Reason for referral: neck pain    CC:   Chief Complaint   Patient presents with    f/u      Lower back pain          Last 3 PDI Scores 3/8/2022   Pain Disability Index (PDI) 46       Interval HPI 5/18/2023: Oc Solis returns to the office for follow up.  He is s/p L5/S1 DANNY on 04/28/2023 with 65% relief.  Today he rates his pain as a 0/10 and overall satisfied with the relief from the DANNY.  He has been able to do some extensive work around his house without significant pain.  He denies any new numbness, weakness or any changes to his bowel or bladder function.  He continues to take tizanidine with good relief.    Pain Intervention History:  -s/p L5/S1 DANNY on 10/19/2022 with 50% relief   -s/p L5/S1 DANNY on 04/28/2023 with 65% relief.     HPI:   Oc Solis is a 63 y.o. male who complains of neck pain.  He has been having worsening neck pain for the last month.  Today his pain is 8/10, constant, burning, numb, tingling with radiation down both arms to both hands.  He reports numbness and weakness.  He denies any bowel bladder control problems.  His pain is worse with laying, coughing sneezing, lifting, nighttime.    History:    Current Outpatient Medications:     diazePAM (VALIUM) 5 MG tablet, Take 1 tablet (5 mg total) by mouth nightly as needed for Anxiety., Disp: 30 tablet, Rfl: 0    furosemide (LASIX) 20 MG tablet, 1-2 po daily PRN, Disp: 50 tablet, Rfl: 3    HYDROcodone-acetaminophen (NORCO) 7.5-325 mg per tablet, Take 1 tablet by mouth every 12 (twelve) hours as needed for Pain., Disp: 40 tablet, Rfl: 0    ibuprofen (ADVIL,MOTRIN) 800 MG tablet, Take 800 mg by mouth every 6 (six) hours as needed., Disp: , Rfl:     lancets Misc, To check BG one times daily, to use with insurance preferred meter, Disp: 50 each, Rfl: 9    losartan (COZAAR) 50 MG tablet, Take 1 tablet (50 mg total) by mouth once daily., Disp: 90 tablet, Rfl: 3     metFORMIN (GLUCOPHAGE-XR) 500 MG ER 24hr tablet, Take 1 tablet (500 mg total) by mouth 2 (two) times daily with meals., Disp: 180 tablet, Rfl: 3    metoprolol tartrate (LOPRESSOR) 25 MG tablet, TAKE 1 TABLET BY MOUTH THREE TIMES A DAY, Disp: 90 tablet, Rfl: 1    pregabalin (LYRICA) 75 MG capsule, Take 1 capsule (75 mg total) by mouth 2 (two) times daily., Disp: 60 capsule, Rfl: 1    rosuvastatin (CRESTOR) 5 MG tablet, Take 1 tablet (5 mg total) by mouth once daily., Disp: 90 tablet, Rfl: 1    tiZANidine (ZANAFLEX) 4 MG tablet, TAKE 1 TABLET BY MOUTH EVERY 6 HOURS AS NEEDED, Disp: 30 tablet, Rfl: 1    Past Medical History:   Diagnosis Date    Anxiety     Asthma     childhood    Candidiasis of skin and nails     Hyperglycemia     Hyperlipidemia     Hypertension     Laceration     Neck pain     Rectal/anal hemorrhage     Unspecified adverse effect of unspecified drug, medicinal and biological substance        Past Surgical History:   Procedure Laterality Date    CERVICAL FUSION  07/2000    C6-7    COLONOSCOPY N/A 1/17/2017    Procedure: COLONOSCOPY;  Surgeon: Carmine Harrell Jr., MD;  Location: [facility];  Service: Endoscopy;  Laterality: N/A;    EPIDURAL STEROID INJECTION INTO LUMBAR SPINE N/A 10/19/2022    Procedure: Injection-steroid-epidural-lumbar L5/S1;  Surgeon: Pipo Santos MD;  Location: [facility] OR;  Service: Pain Management;  Laterality: N/A;    EPIDURAL STEROID INJECTION INTO LUMBAR SPINE N/A 4/28/2023    Procedure: Injection-steroid-epidural-lumbar L5/S1;  Surgeon: Pipo Santos MD;  Location: [facility] OR;  Service: Pain Management;  Laterality: N/A;    FUSION OF POSTERIOR COLUMN OF CERVICAL SPINE USING COMPUTER AIDED NAVIGATION N/A 4/29/2022    Procedure: FUSION, SPINE, POSTERIOR SPINAL COLUMN, CERVICAL,  COMPUTER-ASSISTED NAVIGATION - C3-4 C4-5 C5-6 C6-7 T1;  Surgeon: Miguel Mercado MD;  Location: [facility] OR;  Service: Neurosurgery;  Laterality: N/A;    KYPHOSIS SURGERY  03/19/2013    POSTERIOR CERVICAL  "LAMINECTOMY  4/29/2022    Procedure: LAMINECTOMY, SPINE, CERVICAL, POSTERIOR APPROACH;  Surgeon: Miguel Mercado MD;  Location: [facility] OR;  Service: Neurosurgery;;  C3-T1       Family History   Problem Relation Age of Onset    Thyroid disease Mother     Cancer Mother         lung    Hyperlipidemia Father     Heart disease Father        Social History     Socioeconomic History    Marital status:    Tobacco Use    Smoking status: Never    Smokeless tobacco: Never   Substance and Sexual Activity    Alcohol use: Yes     Alcohol/week: 8.0 standard drinks     Types: 2 Cans of beer, 6 Glasses of wine per week     Comment: less than1 bottle of wine per week now pt reports    Drug use: No    Sexual activity: Yes     Partners: Female       Review of patient's allergies indicates:   Allergen Reactions    Percocet [oxycodone-acetaminophen] Itching    Penicillins Rash       Review of Systems:  General ROS: negative for - fever  Psychological ROS: negative for - hostility  Hematological and Lymphatic ROS: negative for - bleeding problems  Endocrine ROS: negative for - unexpected weight changes  Respiratory ROS: no cough, shortness of breath, or wheezing  Cardiovascular ROS: no chest pain or dyspnea on exertion  Gastrointestinal ROS: no abdominal pain, change in bowel habits, or black or bloody stools  Musculoskeletal ROS: positive for - muscular weakness  Neurological ROS: positive for - numbness/tingling  negative for - bowel and bladder control changes  Dermatological ROS: negative for rash    Physical Exam:  Vitals:    05/18/23 1035   BP: (!) 145/63   Pulse: (!) 59   SpO2: 96%   Weight: 131.1 kg (289 lb 2.1 oz)   Height: 5' 8" (1.727 m)   PainSc: 0-No pain         Body mass index is 43.96 kg/m².    Gen: NAD  Gait: gait intact  Psych:  Mood appropriate for given condition  HEENT: eyes anicteric   GI: Abd soft  CV: RRR  Lungs: breathing unlabored   ROM: limited AROM of the L spine in all planes, full ROM at ankles, knees " and hips  Sensation: intact to light touch in all dermatomes tested from L2-S1 bilaterally  Reflexes: 2+ b/l patella and achilles, biceps and triceps, plantar response down going   Palpation: Diffusely tender over lumbar paraspinals  -TTP over the b/l greater trochanters and bilateral SI joint  Tone: normal in the b/l knees and hips   Skin: intact  Extremities: No edema in b/l ankles or hands  Provacative tests:        Right Left   L2/3 Iliacus Hip flexion  5  5   L3/4 Qudratus Femoris Knee Extension  5  5   L4/5 Tib Anterior Ankle Dorsiflexion   5  5   L5/S1 Extensor Hallicus Longus Great toe extension  5  5                 S1/S2 Gastroc/Soleus Plantar Flexion  5  5       Imaging:  MRI cervical spine 8/9/22  FINDINGS:  Vertebral column: As seen on comparison plain films, there are postsurgical changes with posterior instrumented fusion and decompression.  Bilateral lateral mass screws are present at the C3, C4, C5 and C6 levels.  There are bilateral pedicle screws at the T1 level.  Hardware does result in some degree of susceptibility artifact.  There has been wide posterior decompression from C3 through C7.  There has been previous solid bony fusion of C6 and C7.  There is marked disc space narrowing at the C4-5 and C5-6 levels with at least moderate disc space narrowing at the C3-4 level.  There is stable mild anterolisthesis of C7 on T1.  The odontoid process is intact.     Spinal canal, cord, epidural space: The spinal canal appears to be developmentally normal.  The cord is normal in caliber and signal intensity.  Previously demonstrated multilevel spinal stenosis has been relieved.     Findings by level:     C2-3: There is bilateral facet joint arthropathy with only minimal bulging of the annulus.  There is no spinal stenosis.  There is however moderate right and mild left foraminal stenosis without significant change.  C3-4: There is disc space narrowing, left greater than right uncovertebral spurring, right  greater than left facet joint arthropathy.  There is a broad disc osteophyte complex.  There has been posterior decompression.  Therefore, there is no spinal stenosis.  There is at least moderate bilateral foraminal stenosis.  C4-5: There is marked disc space narrowing.  There is bilateral uncovertebral spurring and facet joint arthropathy.  There is a broad disc osteophyte complex.  Previously demonstrated spinal stenosis has been relieved secondary to posterior decompression.  There is moderate to severe bilateral foraminal stenosis without significant change.  C5-6: There is marked disc space narrowing.  There is bilateral uncovertebral spurring and left greater than right facet joint arthropathy.  There is no spinal stenosis status post decompression.  There is marked bilateral foraminal stenosis without significant change.  C6-7: There is solid bony fusion of C6 and C7.  There is right greater than left uncovertebral spurring.  There is no spinal stenosis.  There is marked right and at least moderate left foraminal stenosis without change.  C7-T1: There is stable anterolisthesis of C7 on T1.  There has been wide posterior decompression.  There is no longer spinal stenosis at this level.  There is facet joint arthropathy.  There is unroofing of a disc bulge.  There is at least moderate bilateral foraminal stenosis.  T1-2: There is marked left facet joint arthropathy contributing to moderate to severe left foraminal stenosis.     Soft tissues, other: There is a well-defined fluid collection within the posterior soft tissues, centered at the level of C7 and T1.  This measures approximately 1.6 x 5.1 x 5.2 cm (AP, transverse, craniocaudad).  This likely represents a postoperative seroma/hematoma.  There is disruption in the posterior spinous soft tissue secondary to multilevel posterior decompression.    MRI thoracic spine 8/9/22  Impression:  1. There is a moderate disc protrusion at the T8-9 level which results  in minimal flattening of the ventral cord surface.  There is no cord edema or myelomalacia but there is mild-to-moderate spinal stenosis.  2. There is some degree of foraminal narrowing present at several levels mostly due to facet joint arthropathy.  These findings are discussed in detail by level above.  3. There is a remote superior endplate compression deformity of T12 which has undergone kyphoplasty.  There is no acute fracture.    U/S RUE 3/4/22  Impression:  No thrombus in central veins of the right upper extremity.    MRI lumbar spine 8/4/22  FINDINGS:  Alignment: Mild levoconvex curvature of the lumbar spine.  Minimal retrolisthesis of L3 on L4 and mild grade 1 anterolisthesis of L4 on L5.  Lordosis is maintained.     Vertebral column: Moderate T12 superior endplate compression fracture status post kyphoplasty.  No associated marrow edema or significant retropulsion.  Remaining lumbar vertebral body heights are maintained without evidence of acute fracture or aggressive marrow placement process.  Multilevel disc degeneration, most pronounced at L5-S1 with moderate disc space narrowing.  Hypertrophied L5 left transverse process which pseudo articulates with the left sacral ala.     Cord: Normal.  Conus terminates at L1.     Degenerative findings:     T11-12 (limited sagittal): Posterior disc osteophyte complex and facet arthropathy, contributing to moderate right and mild left neural foraminal stenosis.  No significant spinal canal stenosis.  T12-L1: Mild diffuse disc bulge.  Mild bilateral facet arthropathy and ligamentum flavum thickening.  Mild bilateral neural foraminal stenosis.  L1-L2: Moderate disc space narrowing.  Left asymmetric diffuse disc bulge with osteophytic ridging.  Mild bilateral facet arthropathy and ligamentum flavum thickening.  Moderate left and mild right neural foraminal stenosis.  Mild spinal canal stenosis.  L2-L3: Left asymmetric diffuse disc bulge with osteophytic ridging.   Moderate bilateral facet arthropathy.  Ligamentum flavum thickening.  Moderate left and mild-to-moderate right neural foraminal stenosis.  Moderate spinal canal stenosis.  L3-L4: Diffuse disc bulge with osteophytic ridging.  Moderate bilateral facet arthropathy and ligamentum flavum thickening.  Moderate bilateral neural foraminal stenosis.  Moderate spinal canal stenosis.  L4-L5: Anterolisthesis uncovering the disc.  Right asymmetric diffuse disc bulge.  Severe bilateral facet arthropathy.  Ligamentum flavum thickening.  Moderate bilateral neural foraminal stenosis.  Severe spinal canal stenosis.  L5-S1: Mild diffuse disc bulge.  Moderate right and mild left facet arthropathy.  Mild right neural foraminal stenosis.  There is no spinal canal stenosis.     Paraspinal muscles & soft tissues: No significant abnormalities.    Labs:  BMP  Lab Results   Component Value Date     03/30/2023    K 3.8 03/30/2023     03/30/2023    CO2 26 03/30/2023    BUN 9 03/30/2023    CREATININE 0.83 03/30/2023    CALCIUM 9.0 03/30/2023    ANIONGAP 11 03/30/2023    ESTGFRAFRICA >60 06/20/2022    EGFRNONAA >60 06/20/2022     Lab Results   Component Value Date    ALT 21 03/30/2023    AST 40 03/30/2023    ALKPHOS 78 03/30/2023    BILITOT 1.3 03/30/2023       Assessment:   Problem List Items Addressed This Visit          Neuro    S/P cervical spinal fusion     Other Visit Diagnoses       Lumbar radiculopathy    -  Primary    Spinal stenosis of lumbar region, unspecified whether neurogenic claudication present                  63 y.o. year old male with PMH hypertension who complains of neck pain.  He has been having worsening neck pain for the last month.  Today his pain is 8/10, constant, burning, numb, tingling with radiation down both arms to both hands.  He reports numbness and weakness.  He denies any bowel bladder control problems.  His pain is worse with laying, coughing sneezing, lifting, nighttime.  History of C6-7 bony  fusion in 2000.    5/18/2023: Oc Solis returns to the office for follow up.  He is s/p L5/S1 DANNY on 04/28/2023 with 65% relief.  Today he rates his pain as a 0/10 and overall satisfied with the relief from the DANNY.  He has been able to do some extensive work around his house without significant pain.  He denies any new numbness, weakness or any changes to his bowel or bladder function.  He continues to take tizanidine with good relief.    - on exam he is full strength  - He is s/p L5/S1 DANNY on 04/28/2023 with 65% relief.   - I independently reviewed his lumbar MRI with him again today and he has severe central canal narrowing at L4-5  - he responded appropriately to the most recent epidural.  - he continues to take pain medications sparingly for severe pain with good relief and no ill side effects.  He finds most relief with tizanidine.  No refill requested today but in the future he is requesting greater than 30 day supply.  - follow up as needed.  Discussed with him today if he fails to get lasting relief with this will likely refer him to Neurosurgery for his severe central canal narrowing at L4/5.      : Not applicable      This note was completed with dictation software and grammatical errors may exist.

## 2023-06-27 PROBLEM — E11.69 HYPERLIPIDEMIA ASSOCIATED WITH TYPE 2 DIABETES MELLITUS: Status: ACTIVE | Noted: 2019-02-04

## 2023-06-27 PROBLEM — M54.12 CERVICAL RADICULOPATHY: Status: ACTIVE | Noted: 2023-06-27

## 2023-06-27 PROBLEM — M54.16 LUMBAR RADICULOPATHY: Status: ACTIVE | Noted: 2023-06-27

## 2023-09-19 DIAGNOSIS — G89.4 CHRONIC PAIN SYNDROME: ICD-10-CM

## 2023-09-20 RX ORDER — PREGABALIN 75 MG/1
75 CAPSULE ORAL 2 TIMES DAILY
Qty: 60 CAPSULE | Refills: 1 | Status: SHIPPED | OUTPATIENT
Start: 2023-09-20 | End: 2023-11-07 | Stop reason: SDUPTHER

## 2023-12-29 PROBLEM — D68.9 COAGULATION DEFECT: Status: RESOLVED | Noted: 2022-01-31 | Resolved: 2023-12-29

## 2024-01-08 DIAGNOSIS — G89.4 CHRONIC PAIN SYNDROME: ICD-10-CM

## 2024-01-08 RX ORDER — PREGABALIN 75 MG/1
75 CAPSULE ORAL 2 TIMES DAILY
Qty: 60 CAPSULE | Refills: 1 | Status: SHIPPED | OUTPATIENT
Start: 2024-01-08 | End: 2024-03-08

## 2024-02-22 DIAGNOSIS — G89.4 CHRONIC PAIN SYNDROME: ICD-10-CM

## 2024-02-22 RX ORDER — HYDROCODONE BITARTRATE AND ACETAMINOPHEN 7.5; 325 MG/1; MG/1
1 TABLET ORAL EVERY 12 HOURS PRN
Qty: 20 TABLET | Refills: 0 | Status: SHIPPED | OUTPATIENT
Start: 2024-02-22 | End: 2024-05-20 | Stop reason: SDUPTHER

## 2024-02-22 NOTE — TELEPHONE ENCOUNTER
----- Message from Angel Sykes sent at 2/22/2024 10:58 AM CST -----  Contact: Self  Type:  RX Refill Request    Who Called:  PT  Refill or New Rx:  Refill  RX Name and Strength:  HYDROcodone-acetaminophen (NORCO) 7.5-325 mg per tablet  How is the patient currently taking it? (ex. 1XDay):    Is this a 30 day or 90 day RX:    Preferred Pharmacy with phone number:    [id number] - [place] - [address] [place], [place] [address] [address] [place] [address] [address] [facility] [address]  Phone: [phone] Fax: [phone]    Best Call Back Number:  [phone]    Additional Information:

## 2024-04-08 ENCOUNTER — OFFICE VISIT (OUTPATIENT)
Dept: PAIN MEDICINE | Facility: CLINIC | Age: 64
End: 2024-04-08
Payer: MEDICARE

## 2024-04-08 ENCOUNTER — TELEPHONE (OUTPATIENT)
Dept: PAIN MEDICINE | Facility: CLINIC | Age: 64
End: 2024-04-08

## 2024-04-08 VITALS
HEIGHT: 68 IN | BODY MASS INDEX: 44.87 KG/M2 | DIASTOLIC BLOOD PRESSURE: 95 MMHG | HEART RATE: 96 BPM | SYSTOLIC BLOOD PRESSURE: 217 MMHG | WEIGHT: 296.06 LBS

## 2024-04-08 DIAGNOSIS — M54.16 LUMBAR RADICULOPATHY: Primary | ICD-10-CM

## 2024-04-08 DIAGNOSIS — M79.18 MYOFASCIAL PAIN: ICD-10-CM

## 2024-04-08 PROCEDURE — 3080F DIAST BP >= 90 MM HG: CPT | Mod: CPTII,S$GLB,, | Performed by: ANESTHESIOLOGY

## 2024-04-08 PROCEDURE — 99214 OFFICE O/P EST MOD 30 MIN: CPT | Mod: S$GLB,,, | Performed by: ANESTHESIOLOGY

## 2024-04-08 PROCEDURE — 3044F HG A1C LEVEL LT 7.0%: CPT | Mod: CPTII,S$GLB,, | Performed by: ANESTHESIOLOGY

## 2024-04-08 PROCEDURE — 3008F BODY MASS INDEX DOCD: CPT | Mod: CPTII,S$GLB,, | Performed by: ANESTHESIOLOGY

## 2024-04-08 PROCEDURE — 1160F RVW MEDS BY RX/DR IN RCRD: CPT | Mod: CPTII,S$GLB,, | Performed by: ANESTHESIOLOGY

## 2024-04-08 PROCEDURE — 1125F AMNT PAIN NOTED PAIN PRSNT: CPT | Mod: CPTII,S$GLB,, | Performed by: ANESTHESIOLOGY

## 2024-04-08 PROCEDURE — 4010F ACE/ARB THERAPY RXD/TAKEN: CPT | Mod: CPTII,S$GLB,, | Performed by: ANESTHESIOLOGY

## 2024-04-08 PROCEDURE — 99999 PR PBB SHADOW E&M-EST. PATIENT-LVL III: CPT | Mod: PBBFAC,,, | Performed by: ANESTHESIOLOGY

## 2024-04-08 PROCEDURE — 3077F SYST BP >= 140 MM HG: CPT | Mod: CPTII,S$GLB,, | Performed by: ANESTHESIOLOGY

## 2024-04-08 PROCEDURE — 1159F MED LIST DOCD IN RCRD: CPT | Mod: CPTII,S$GLB,, | Performed by: ANESTHESIOLOGY

## 2024-04-08 RX ORDER — TIZANIDINE 4 MG/1
4 TABLET ORAL 2 TIMES DAILY PRN
Qty: 90 TABLET | Refills: 0 | Status: SHIPPED | OUTPATIENT
Start: 2024-04-08 | End: 2024-05-20 | Stop reason: SDUPTHER

## 2024-04-08 NOTE — H&P (VIEW-ONLY)
[facility] Follow Evaluation    Referred by: Donna Angeles NP  Reason for referral: neck pain    CC:   Chief Complaint   Patient presents with    Low-back Pain     Between shoulder blades              4/8/2024     4:25 PM 3/8/2022     8:36 AM   Last 3 PDI Scores   Pain Disability Index (PDI) 25 46       Interval HPI 4/8/2024: Oc Solis returns to the office for follow up.  Reports return in his lower back pain.  He can get some radiating towards the top of the left buttock.  He rates his pain as 7/10.  No new numbness or weakness.  Pain is worse with standing and activities.    Pain Intervention History:  -s/p L5/S1 DANNY on 10/19/2022 with 50% relief   -s/p L5/S1 DANNY on 04/28/2023 with 65% relief.     HPI:   Oc Solis is a 63 y.o. male who complains of neck pain.  He has been having worsening neck pain for the last month.  Today his pain is 8/10, constant, burning, numb, tingling with radiation down both arms to both hands.  He reports numbness and weakness.  He denies any bowel bladder control problems.  His pain is worse with laying, coughing sneezing, lifting, nighttime.    History:    Current Outpatient Medications:     diazePAM (VALIUM) 5 MG tablet, Take 1 tablet (5 mg total) by mouth nightly as needed for Anxiety., Disp: 30 tablet, Rfl: 1    furosemide (LASIX) 20 MG tablet, TAKE 1 TO 2 TABLETS BY MOUTH AS NEEDED, Disp: 50 tablet, Rfl: 3    HYDROcodone-acetaminophen (NORCO) 7.5-325 mg per tablet, Take 1 tablet by mouth every 12 (twelve) hours as needed for Pain., Disp: 20 tablet, Rfl: 0    ibuprofen (ADVIL,MOTRIN) 800 MG tablet, Take 800 mg by mouth every 6 (six) hours as needed., Disp: , Rfl:     lancets Misc, To check BG one times daily, to use with insurance preferred meter, Disp: 50 each, Rfl: 9    losartan (COZAAR) 50 MG tablet, Take 1 tablet (50 mg total) by mouth once daily., Disp: 90 tablet, Rfl: 3    metFORMIN (GLUCOPHAGE-XR) 500 MG ER 24hr tablet, TAKE 1 TABLET BY MOUTH TWICE A  DAY WITH MEALS, Disp: 180 tablet, Rfl: 3    metoprolol tartrate (LOPRESSOR) 25 MG tablet, TAKE 1 TABLET BY MOUTH THREE TIMES A DAY, Disp: 270 tablet, Rfl: 1    omeprazole (PRILOSEC) 20 MG capsule, Take 1 capsule (20 mg total) by mouth daily as needed (Heartburn)., Disp: 30 capsule, Rfl: 11    pregabalin (LYRICA) 75 MG capsule, Take 1 capsule (75 mg total) by mouth 2 (two) times daily., Disp: 60 capsule, Rfl: 1    rosuvastatin (CRESTOR) 5 MG tablet, TAKE 1 TABLET BY MOUTH EVERY DAY, Disp: 90 tablet, Rfl: 1    tiZANidine (ZANAFLEX) 4 MG tablet, Take 1 tablet (4 mg total) by mouth 2 (two) times daily as needed (muscle spasms)., Disp: 90 tablet, Rfl: 0    Past Medical History:   Diagnosis Date    Anxiety     Asthma     childhood    Candidiasis of skin and nails     Hyperglycemia     Hyperlipidemia     Hypertension     Laceration     Neck pain     Rectal/anal hemorrhage     Unspecified adverse effect of unspecified drug, medicinal and biological substance        Past Surgical History:   Procedure Laterality Date    CERVICAL FUSION  07/2000    C6-7    COLONOSCOPY N/A 1/17/2017    Procedure: COLONOSCOPY;  Surgeon: Carmine Harrell Jr., MD;  Location: [facility];  Service: Endoscopy;  Laterality: N/A;    EPIDURAL STEROID INJECTION INTO LUMBAR SPINE N/A 10/19/2022    Procedure: Injection-steroid-epidural-lumbar L5/S1;  Surgeon: Pipo Santos MD;  Location: [facility] OR;  Service: Pain Management;  Laterality: N/A;    EPIDURAL STEROID INJECTION INTO LUMBAR SPINE N/A 4/28/2023    Procedure: Injection-steroid-epidural-lumbar L5/S1;  Surgeon: Pipo Santos MD;  Location: [facility] OR;  Service: Pain Management;  Laterality: N/A;    FUSION OF POSTERIOR COLUMN OF CERVICAL SPINE USING COMPUTER AIDED NAVIGATION N/A 4/29/2022    Procedure: FUSION, SPINE, POSTERIOR SPINAL COLUMN, CERVICAL,  COMPUTER-ASSISTED NAVIGATION - C3-4 C4-5 C5-6 C6-7 T1;  Surgeon: Miguel Mercado MD;  Location: [facility] OR;  Service: Neurosurgery;  Laterality: N/A;     "KYPHOSIS SURGERY  03/19/2013    POSTERIOR CERVICAL LAMINECTOMY  4/29/2022    Procedure: LAMINECTOMY, SPINE, CERVICAL, POSTERIOR APPROACH;  Surgeon: Miguel Mercado MD;  Location: [facility] OR;  Service: Neurosurgery;;  C3-T1       Family History   Problem Relation Age of Onset    Thyroid disease Mother     Cancer Mother         lung    Hyperlipidemia Father     Heart disease Father        Social History     Socioeconomic History    Marital status:    Tobacco Use    Smoking status: Never    Smokeless tobacco: Never   Substance and Sexual Activity    Alcohol use: Yes     Alcohol/week: 8.0 standard drinks of alcohol     Types: 2 Cans of beer, 6 Glasses of wine per week     Comment: less than1 bottle of wine per week now pt reports    Drug use: No    Sexual activity: Yes     Partners: Female       Review of patient's allergies indicates:   Allergen Reactions    Percocet [oxycodone-acetaminophen] Itching    Penicillins Rash       Review of Systems:  General ROS: negative for - fever  Psychological ROS: negative for - hostility  Hematological and Lymphatic ROS: negative for - bleeding problems  Endocrine ROS: negative for - unexpected weight changes  Respiratory ROS: no cough, shortness of breath, or wheezing  Cardiovascular ROS: no chest pain or dyspnea on exertion  Gastrointestinal ROS: no abdominal pain, change in bowel habits, or black or bloody stools  Musculoskeletal ROS: positive for - muscular weakness  Neurological ROS: positive for - numbness/tingling  negative for - bowel and bladder control changes  Dermatological ROS: negative for rash    Physical Exam:  Vitals:    04/08/24 1622   BP: (!) 217/95   Pulse: 96   Weight: 134.3 kg (296 lb 1.2 oz)   Height: 5' 8" (1.727 m)   PainSc:   7   PainLoc: Back         Body mass index is 45.02 kg/m².    Gen: NAD  Gait: gait intact  Psych:  Mood appropriate for given condition  HEENT: eyes anicteric   GI: Abd soft  CV: RRR  Lungs: breathing unlabored   ROM: limited AROM " of the L spine in all planes, full ROM at ankles, knees and hips  Sensation: intact to light touch in all dermatomes tested from L2-S1 bilaterally  Reflexes: 2+ b/l patella and achilles, biceps and triceps, plantar response down going   Palpation: Diffusely tender over lumbar paraspinals  -TTP over the b/l greater trochanters and bilateral SI joint  Tone: normal in the b/l knees and hips   Skin: intact  Extremities: No edema in b/l ankles or hands  Provacative tests:        Right Left   L2/3 Iliacus Hip flexion  5  5   L3/4 Qudratus Femoris Knee Extension  5  5   L4/5 Tib Anterior Ankle Dorsiflexion   5  5   L5/S1 Extensor Hallicus Longus Great toe extension  5  5                 S1/S2 Gastroc/Soleus Plantar Flexion  5  5       Imaging:  MRI cervical spine 8/9/22  FINDINGS:  Vertebral column: As seen on comparison plain films, there are postsurgical changes with posterior instrumented fusion and decompression.  Bilateral lateral mass screws are present at the C3, C4, C5 and C6 levels.  There are bilateral pedicle screws at the T1 level.  Hardware does result in some degree of susceptibility artifact.  There has been wide posterior decompression from C3 through C7.  There has been previous solid bony fusion of C6 and C7.  There is marked disc space narrowing at the C4-5 and C5-6 levels with at least moderate disc space narrowing at the C3-4 level.  There is stable mild anterolisthesis of C7 on T1.  The odontoid process is intact.     Spinal canal, cord, epidural space: The spinal canal appears to be developmentally normal.  The cord is normal in caliber and signal intensity.  Previously demonstrated multilevel spinal stenosis has been relieved.     Findings by level:     C2-3: There is bilateral facet joint arthropathy with only minimal bulging of the annulus.  There is no spinal stenosis.  There is however moderate right and mild left foraminal stenosis without significant change.  C3-4: There is disc space  narrowing, left greater than right uncovertebral spurring, right greater than left facet joint arthropathy.  There is a broad disc osteophyte complex.  There has been posterior decompression.  Therefore, there is no spinal stenosis.  There is at least moderate bilateral foraminal stenosis.  C4-5: There is marked disc space narrowing.  There is bilateral uncovertebral spurring and facet joint arthropathy.  There is a broad disc osteophyte complex.  Previously demonstrated spinal stenosis has been relieved secondary to posterior decompression.  There is moderate to severe bilateral foraminal stenosis without significant change.  C5-6: There is marked disc space narrowing.  There is bilateral uncovertebral spurring and left greater than right facet joint arthropathy.  There is no spinal stenosis status post decompression.  There is marked bilateral foraminal stenosis without significant change.  C6-7: There is solid bony fusion of C6 and C7.  There is right greater than left uncovertebral spurring.  There is no spinal stenosis.  There is marked right and at least moderate left foraminal stenosis without change.  C7-T1: There is stable anterolisthesis of C7 on T1.  There has been wide posterior decompression.  There is no longer spinal stenosis at this level.  There is facet joint arthropathy.  There is unroofing of a disc bulge.  There is at least moderate bilateral foraminal stenosis.  T1-2: There is marked left facet joint arthropathy contributing to moderate to severe left foraminal stenosis.     Soft tissues, other: There is a well-defined fluid collection within the posterior soft tissues, centered at the level of C7 and T1.  This measures approximately 1.6 x 5.1 x 5.2 cm (AP, transverse, craniocaudad).  This likely represents a postoperative seroma/hematoma.  There is disruption in the posterior spinous soft tissue secondary to multilevel posterior decompression.    MRI thoracic spine 8/9/22  Impression:  1.  There is a moderate disc protrusion at the T8-9 level which results in minimal flattening of the ventral cord surface.  There is no cord edema or myelomalacia but there is mild-to-moderate spinal stenosis.  2. There is some degree of foraminal narrowing present at several levels mostly due to facet joint arthropathy.  These findings are discussed in detail by level above.  3. There is a remote superior endplate compression deformity of T12 which has undergone kyphoplasty.  There is no acute fracture.    U/S RUE 3/4/22  Impression:  No thrombus in central veins of the right upper extremity.    MRI lumbar spine 8/4/22  FINDINGS:  Alignment: Mild levoconvex curvature of the lumbar spine.  Minimal retrolisthesis of L3 on L4 and mild grade 1 anterolisthesis of L4 on L5.  Lordosis is maintained.     Vertebral column: Moderate T12 superior endplate compression fracture status post kyphoplasty.  No associated marrow edema or significant retropulsion.  Remaining lumbar vertebral body heights are maintained without evidence of acute fracture or aggressive marrow placement process.  Multilevel disc degeneration, most pronounced at L5-S1 with moderate disc space narrowing.  Hypertrophied L5 left transverse process which pseudo articulates with the left sacral ala.     Cord: Normal.  Conus terminates at L1.     Degenerative findings:     T11-12 (limited sagittal): Posterior disc osteophyte complex and facet arthropathy, contributing to moderate right and mild left neural foraminal stenosis.  No significant spinal canal stenosis.  T12-L1: Mild diffuse disc bulge.  Mild bilateral facet arthropathy and ligamentum flavum thickening.  Mild bilateral neural foraminal stenosis.  L1-L2: Moderate disc space narrowing.  Left asymmetric diffuse disc bulge with osteophytic ridging.  Mild bilateral facet arthropathy and ligamentum flavum thickening.  Moderate left and mild right neural foraminal stenosis.  Mild spinal canal stenosis.  L2-L3:  Left asymmetric diffuse disc bulge with osteophytic ridging.  Moderate bilateral facet arthropathy.  Ligamentum flavum thickening.  Moderate left and mild-to-moderate right neural foraminal stenosis.  Moderate spinal canal stenosis.  L3-L4: Diffuse disc bulge with osteophytic ridging.  Moderate bilateral facet arthropathy and ligamentum flavum thickening.  Moderate bilateral neural foraminal stenosis.  Moderate spinal canal stenosis.  L4-L5: Anterolisthesis uncovering the disc.  Right asymmetric diffuse disc bulge.  Severe bilateral facet arthropathy.  Ligamentum flavum thickening.  Moderate bilateral neural foraminal stenosis.  Severe spinal canal stenosis.  L5-S1: Mild diffuse disc bulge.  Moderate right and mild left facet arthropathy.  Mild right neural foraminal stenosis.  There is no spinal canal stenosis.     Paraspinal muscles & soft tissues: No significant abnormalities.    Labs:  BMP  Lab Results   Component Value Date     (L) 03/03/2024    K 3.6 03/03/2024     03/03/2024    CO2 23 03/03/2024    BUN 12 03/03/2024    CREATININE 0.90 03/03/2024    CALCIUM 9.4 03/03/2024    ANIONGAP 11 03/03/2024    ESTGFRAFRICA >60 06/20/2022    EGFRNONAA >60 06/20/2022     Lab Results   Component Value Date    ALT 25 03/03/2024    AST 50 03/03/2024    ALKPHOS 74 03/03/2024    BILITOT 1.3 03/03/2024       Assessment:   Problem List Items Addressed This Visit          Neuro    Lumbar radiculopathy - Primary     Other Visit Diagnoses       Myofascial pain        Relevant Medications    tiZANidine (ZANAFLEX) 4 MG tablet              63 y.o. year old male with PMH hypertension who complains of neck pain.  He has been having worsening neck pain for the last month.  Today his pain is 8/10, constant, burning, numb, tingling with radiation down both arms to both hands.  He reports numbness and weakness.  He denies any bowel bladder control problems.  His pain is worse with laying, coughing sneezing, lifting, nighttime.   History of C6-7 bony fusion in 2000.      4/8/24 - Oc Solis returns to the office for follow up.  Reports return in his lower back pain.  He can get some radiating towards the top of the left buttock.  He rates his pain as 7/10.  No new numbness or weakness.  Pain is worse with standing and activities.    - on exam he is full strength and intact sensation to light touch bilateral L2-S1  - I independently reviewed his lumbar MRI with him again today and he has severe central canal narrowing at L4-5  - over the past 6 months he has been maintained physician led home exercise program and also takes tizanidine for the myofascial component of his pain and Lyrica for the neuropathic component is pain however his back pain has returned in his limiting his mobility and interfering with the quality of his life.  - I have refilled tizanidine for him as it does a good job of helping the myofascial component of his pain and he has not having any adverse events or side effects with the medication  - he is status post L5-S1 DANNY on 04/28/2023 with 65% relief of his pain lasting for over 6 months  - we will schedule for repeat L5-S1 DANNY.  The risks and benefits of this intervention, and alternative therapies were discussed with the patient.  The discussion of risks included infection, bleeding, need for additional procedures or surgery, nerve damage.  Questions regarding the procedure, risks, expected outcome, and possible side effects were solicited and answered to the patient's satisfaction.  Kimani Solis wishes to proceed with the injection or procedure.  Written consent was obtained.  - we will get clearance for her him hold his aspirin prior to DANNY.  - follow up 2-3 weeks post injection      : Not applicable      This note was completed with dictation software and grammatical errors may exist.

## 2024-04-08 NOTE — TELEPHONE ENCOUNTER
Physician - Dr Santos    Type of Procedure/Injection - Lumbar Epidural  L5/S1           Laterality - NA      Anxiolysis- RNIV      Need to hold medication - Yes      Aspirin for 7 days      Clearance needed - Yes      Follow up - 3 week

## 2024-04-08 NOTE — PROGRESS NOTES
[facility] Follow Evaluation    Referred by: Donna Angeles NP  Reason for referral: neck pain    CC:   Chief Complaint   Patient presents with    Low-back Pain     Between shoulder blades              4/8/2024     4:25 PM 3/8/2022     8:36 AM   Last 3 PDI Scores   Pain Disability Index (PDI) 25 46       Interval HPI 4/8/2024: Oc Solis returns to the office for follow up.  Reports return in his lower back pain.  He can get some radiating towards the top of the left buttock.  He rates his pain as 7/10.  No new numbness or weakness.  Pain is worse with standing and activities.    Pain Intervention History:  -s/p L5/S1 DANNY on 10/19/2022 with 50% relief   -s/p L5/S1 DANNY on 04/28/2023 with 65% relief.     HPI:   Oc Solis is a 63 y.o. male who complains of neck pain.  He has been having worsening neck pain for the last month.  Today his pain is 8/10, constant, burning, numb, tingling with radiation down both arms to both hands.  He reports numbness and weakness.  He denies any bowel bladder control problems.  His pain is worse with laying, coughing sneezing, lifting, nighttime.    History:    Current Outpatient Medications:     diazePAM (VALIUM) 5 MG tablet, Take 1 tablet (5 mg total) by mouth nightly as needed for Anxiety., Disp: 30 tablet, Rfl: 1    furosemide (LASIX) 20 MG tablet, TAKE 1 TO 2 TABLETS BY MOUTH AS NEEDED, Disp: 50 tablet, Rfl: 3    HYDROcodone-acetaminophen (NORCO) 7.5-325 mg per tablet, Take 1 tablet by mouth every 12 (twelve) hours as needed for Pain., Disp: 20 tablet, Rfl: 0    ibuprofen (ADVIL,MOTRIN) 800 MG tablet, Take 800 mg by mouth every 6 (six) hours as needed., Disp: , Rfl:     lancets Misc, To check BG one times daily, to use with insurance preferred meter, Disp: 50 each, Rfl: 9    losartan (COZAAR) 50 MG tablet, Take 1 tablet (50 mg total) by mouth once daily., Disp: 90 tablet, Rfl: 3    metFORMIN (GLUCOPHAGE-XR) 500 MG ER 24hr tablet, TAKE 1 TABLET BY MOUTH TWICE A  DAY WITH MEALS, Disp: 180 tablet, Rfl: 3    metoprolol tartrate (LOPRESSOR) 25 MG tablet, TAKE 1 TABLET BY MOUTH THREE TIMES A DAY, Disp: 270 tablet, Rfl: 1    omeprazole (PRILOSEC) 20 MG capsule, Take 1 capsule (20 mg total) by mouth daily as needed (Heartburn)., Disp: 30 capsule, Rfl: 11    pregabalin (LYRICA) 75 MG capsule, Take 1 capsule (75 mg total) by mouth 2 (two) times daily., Disp: 60 capsule, Rfl: 1    rosuvastatin (CRESTOR) 5 MG tablet, TAKE 1 TABLET BY MOUTH EVERY DAY, Disp: 90 tablet, Rfl: 1    tiZANidine (ZANAFLEX) 4 MG tablet, Take 1 tablet (4 mg total) by mouth 2 (two) times daily as needed (muscle spasms)., Disp: 90 tablet, Rfl: 0    Past Medical History:   Diagnosis Date    Anxiety     Asthma     childhood    Candidiasis of skin and nails     Hyperglycemia     Hyperlipidemia     Hypertension     Laceration     Neck pain     Rectal/anal hemorrhage     Unspecified adverse effect of unspecified drug, medicinal and biological substance        Past Surgical History:   Procedure Laterality Date    CERVICAL FUSION  07/2000    C6-7    COLONOSCOPY N/A 1/17/2017    Procedure: COLONOSCOPY;  Surgeon: Carmine Harrell Jr., MD;  Location: [facility];  Service: Endoscopy;  Laterality: N/A;    EPIDURAL STEROID INJECTION INTO LUMBAR SPINE N/A 10/19/2022    Procedure: Injection-steroid-epidural-lumbar L5/S1;  Surgeon: Pipo Santos MD;  Location: [facility] OR;  Service: Pain Management;  Laterality: N/A;    EPIDURAL STEROID INJECTION INTO LUMBAR SPINE N/A 4/28/2023    Procedure: Injection-steroid-epidural-lumbar L5/S1;  Surgeon: Pipo Santos MD;  Location: [facility] OR;  Service: Pain Management;  Laterality: N/A;    FUSION OF POSTERIOR COLUMN OF CERVICAL SPINE USING COMPUTER AIDED NAVIGATION N/A 4/29/2022    Procedure: FUSION, SPINE, POSTERIOR SPINAL COLUMN, CERVICAL,  COMPUTER-ASSISTED NAVIGATION - C3-4 C4-5 C5-6 C6-7 T1;  Surgeon: Miguel Mercado MD;  Location: [facility] OR;  Service: Neurosurgery;  Laterality: N/A;     "KYPHOSIS SURGERY  03/19/2013    POSTERIOR CERVICAL LAMINECTOMY  4/29/2022    Procedure: LAMINECTOMY, SPINE, CERVICAL, POSTERIOR APPROACH;  Surgeon: Miguel Mercado MD;  Location: [facility] OR;  Service: Neurosurgery;;  C3-T1       Family History   Problem Relation Age of Onset    Thyroid disease Mother     Cancer Mother         lung    Hyperlipidemia Father     Heart disease Father        Social History     Socioeconomic History    Marital status:    Tobacco Use    Smoking status: Never    Smokeless tobacco: Never   Substance and Sexual Activity    Alcohol use: Yes     Alcohol/week: 8.0 standard drinks of alcohol     Types: 2 Cans of beer, 6 Glasses of wine per week     Comment: less than1 bottle of wine per week now pt reports    Drug use: No    Sexual activity: Yes     Partners: Female       Review of patient's allergies indicates:   Allergen Reactions    Percocet [oxycodone-acetaminophen] Itching    Penicillins Rash       Review of Systems:  General ROS: negative for - fever  Psychological ROS: negative for - hostility  Hematological and Lymphatic ROS: negative for - bleeding problems  Endocrine ROS: negative for - unexpected weight changes  Respiratory ROS: no cough, shortness of breath, or wheezing  Cardiovascular ROS: no chest pain or dyspnea on exertion  Gastrointestinal ROS: no abdominal pain, change in bowel habits, or black or bloody stools  Musculoskeletal ROS: positive for - muscular weakness  Neurological ROS: positive for - numbness/tingling  negative for - bowel and bladder control changes  Dermatological ROS: negative for rash    Physical Exam:  Vitals:    04/08/24 1622   BP: (!) 217/95   Pulse: 96   Weight: 134.3 kg (296 lb 1.2 oz)   Height: 5' 8" (1.727 m)   PainSc:   7   PainLoc: Back         Body mass index is 45.02 kg/m².    Gen: NAD  Gait: gait intact  Psych:  Mood appropriate for given condition  HEENT: eyes anicteric   GI: Abd soft  CV: RRR  Lungs: breathing unlabored   ROM: limited AROM " of the L spine in all planes, full ROM at ankles, knees and hips  Sensation: intact to light touch in all dermatomes tested from L2-S1 bilaterally  Reflexes: 2+ b/l patella and achilles, biceps and triceps, plantar response down going   Palpation: Diffusely tender over lumbar paraspinals  -TTP over the b/l greater trochanters and bilateral SI joint  Tone: normal in the b/l knees and hips   Skin: intact  Extremities: No edema in b/l ankles or hands  Provacative tests:        Right Left   L2/3 Iliacus Hip flexion  5  5   L3/4 Qudratus Femoris Knee Extension  5  5   L4/5 Tib Anterior Ankle Dorsiflexion   5  5   L5/S1 Extensor Hallicus Longus Great toe extension  5  5                 S1/S2 Gastroc/Soleus Plantar Flexion  5  5       Imaging:  MRI cervical spine 8/9/22  FINDINGS:  Vertebral column: As seen on comparison plain films, there are postsurgical changes with posterior instrumented fusion and decompression.  Bilateral lateral mass screws are present at the C3, C4, C5 and C6 levels.  There are bilateral pedicle screws at the T1 level.  Hardware does result in some degree of susceptibility artifact.  There has been wide posterior decompression from C3 through C7.  There has been previous solid bony fusion of C6 and C7.  There is marked disc space narrowing at the C4-5 and C5-6 levels with at least moderate disc space narrowing at the C3-4 level.  There is stable mild anterolisthesis of C7 on T1.  The odontoid process is intact.     Spinal canal, cord, epidural space: The spinal canal appears to be developmentally normal.  The cord is normal in caliber and signal intensity.  Previously demonstrated multilevel spinal stenosis has been relieved.     Findings by level:     C2-3: There is bilateral facet joint arthropathy with only minimal bulging of the annulus.  There is no spinal stenosis.  There is however moderate right and mild left foraminal stenosis without significant change.  C3-4: There is disc space  narrowing, left greater than right uncovertebral spurring, right greater than left facet joint arthropathy.  There is a broad disc osteophyte complex.  There has been posterior decompression.  Therefore, there is no spinal stenosis.  There is at least moderate bilateral foraminal stenosis.  C4-5: There is marked disc space narrowing.  There is bilateral uncovertebral spurring and facet joint arthropathy.  There is a broad disc osteophyte complex.  Previously demonstrated spinal stenosis has been relieved secondary to posterior decompression.  There is moderate to severe bilateral foraminal stenosis without significant change.  C5-6: There is marked disc space narrowing.  There is bilateral uncovertebral spurring and left greater than right facet joint arthropathy.  There is no spinal stenosis status post decompression.  There is marked bilateral foraminal stenosis without significant change.  C6-7: There is solid bony fusion of C6 and C7.  There is right greater than left uncovertebral spurring.  There is no spinal stenosis.  There is marked right and at least moderate left foraminal stenosis without change.  C7-T1: There is stable anterolisthesis of C7 on T1.  There has been wide posterior decompression.  There is no longer spinal stenosis at this level.  There is facet joint arthropathy.  There is unroofing of a disc bulge.  There is at least moderate bilateral foraminal stenosis.  T1-2: There is marked left facet joint arthropathy contributing to moderate to severe left foraminal stenosis.     Soft tissues, other: There is a well-defined fluid collection within the posterior soft tissues, centered at the level of C7 and T1.  This measures approximately 1.6 x 5.1 x 5.2 cm (AP, transverse, craniocaudad).  This likely represents a postoperative seroma/hematoma.  There is disruption in the posterior spinous soft tissue secondary to multilevel posterior decompression.    MRI thoracic spine 8/9/22  Impression:  1.  There is a moderate disc protrusion at the T8-9 level which results in minimal flattening of the ventral cord surface.  There is no cord edema or myelomalacia but there is mild-to-moderate spinal stenosis.  2. There is some degree of foraminal narrowing present at several levels mostly due to facet joint arthropathy.  These findings are discussed in detail by level above.  3. There is a remote superior endplate compression deformity of T12 which has undergone kyphoplasty.  There is no acute fracture.    U/S RUE 3/4/22  Impression:  No thrombus in central veins of the right upper extremity.    MRI lumbar spine 8/4/22  FINDINGS:  Alignment: Mild levoconvex curvature of the lumbar spine.  Minimal retrolisthesis of L3 on L4 and mild grade 1 anterolisthesis of L4 on L5.  Lordosis is maintained.     Vertebral column: Moderate T12 superior endplate compression fracture status post kyphoplasty.  No associated marrow edema or significant retropulsion.  Remaining lumbar vertebral body heights are maintained without evidence of acute fracture or aggressive marrow placement process.  Multilevel disc degeneration, most pronounced at L5-S1 with moderate disc space narrowing.  Hypertrophied L5 left transverse process which pseudo articulates with the left sacral ala.     Cord: Normal.  Conus terminates at L1.     Degenerative findings:     T11-12 (limited sagittal): Posterior disc osteophyte complex and facet arthropathy, contributing to moderate right and mild left neural foraminal stenosis.  No significant spinal canal stenosis.  T12-L1: Mild diffuse disc bulge.  Mild bilateral facet arthropathy and ligamentum flavum thickening.  Mild bilateral neural foraminal stenosis.  L1-L2: Moderate disc space narrowing.  Left asymmetric diffuse disc bulge with osteophytic ridging.  Mild bilateral facet arthropathy and ligamentum flavum thickening.  Moderate left and mild right neural foraminal stenosis.  Mild spinal canal stenosis.  L2-L3:  Left asymmetric diffuse disc bulge with osteophytic ridging.  Moderate bilateral facet arthropathy.  Ligamentum flavum thickening.  Moderate left and mild-to-moderate right neural foraminal stenosis.  Moderate spinal canal stenosis.  L3-L4: Diffuse disc bulge with osteophytic ridging.  Moderate bilateral facet arthropathy and ligamentum flavum thickening.  Moderate bilateral neural foraminal stenosis.  Moderate spinal canal stenosis.  L4-L5: Anterolisthesis uncovering the disc.  Right asymmetric diffuse disc bulge.  Severe bilateral facet arthropathy.  Ligamentum flavum thickening.  Moderate bilateral neural foraminal stenosis.  Severe spinal canal stenosis.  L5-S1: Mild diffuse disc bulge.  Moderate right and mild left facet arthropathy.  Mild right neural foraminal stenosis.  There is no spinal canal stenosis.     Paraspinal muscles & soft tissues: No significant abnormalities.    Labs:  BMP  Lab Results   Component Value Date     (L) 03/03/2024    K 3.6 03/03/2024     03/03/2024    CO2 23 03/03/2024    BUN 12 03/03/2024    CREATININE 0.90 03/03/2024    CALCIUM 9.4 03/03/2024    ANIONGAP 11 03/03/2024    ESTGFRAFRICA >60 06/20/2022    EGFRNONAA >60 06/20/2022     Lab Results   Component Value Date    ALT 25 03/03/2024    AST 50 03/03/2024    ALKPHOS 74 03/03/2024    BILITOT 1.3 03/03/2024       Assessment:   Problem List Items Addressed This Visit          Neuro    Lumbar radiculopathy - Primary     Other Visit Diagnoses       Myofascial pain        Relevant Medications    tiZANidine (ZANAFLEX) 4 MG tablet              63 y.o. year old male with PMH hypertension who complains of neck pain.  He has been having worsening neck pain for the last month.  Today his pain is 8/10, constant, burning, numb, tingling with radiation down both arms to both hands.  He reports numbness and weakness.  He denies any bowel bladder control problems.  His pain is worse with laying, coughing sneezing, lifting, nighttime.   History of C6-7 bony fusion in 2000.      4/8/24 - Oc Solis returns to the office for follow up.  Reports return in his lower back pain.  He can get some radiating towards the top of the left buttock.  He rates his pain as 7/10.  No new numbness or weakness.  Pain is worse with standing and activities.    - on exam he is full strength and intact sensation to light touch bilateral L2-S1  - I independently reviewed his lumbar MRI with him again today and he has severe central canal narrowing at L4-5  - over the past 6 months he has been maintained physician led home exercise program and also takes tizanidine for the myofascial component of his pain and Lyrica for the neuropathic component is pain however his back pain has returned in his limiting his mobility and interfering with the quality of his life.  - I have refilled tizanidine for him as it does a good job of helping the myofascial component of his pain and he has not having any adverse events or side effects with the medication  - he is status post L5-S1 DANNY on 04/28/2023 with 65% relief of his pain lasting for over 6 months  - we will schedule for repeat L5-S1 DANNY.  The risks and benefits of this intervention, and alternative therapies were discussed with the patient.  The discussion of risks included infection, bleeding, need for additional procedures or surgery, nerve damage.  Questions regarding the procedure, risks, expected outcome, and possible side effects were solicited and answered to the patient's satisfaction.  Kimani Solis wishes to proceed with the injection or procedure.  Written consent was obtained.  - we will get clearance for her him hold his aspirin prior to DANNY.  - follow up 2-3 weeks post injection      : Not applicable      This note was completed with dictation software and grammatical errors may exist.

## 2024-04-09 ENCOUNTER — TELEPHONE (OUTPATIENT)
Dept: PAIN MEDICINE | Facility: CLINIC | Age: 64
End: 2024-04-09
Payer: MEDICARE

## 2024-04-09 DIAGNOSIS — M54.16 LUMBAR RADICULOPATHY: Primary | ICD-10-CM

## 2024-04-09 RX ORDER — SODIUM CHLORIDE, SODIUM LACTATE, POTASSIUM CHLORIDE, CALCIUM CHLORIDE 600; 310; 30; 20 MG/100ML; MG/100ML; MG/100ML; MG/100ML
INJECTION, SOLUTION INTRAVENOUS CONTINUOUS
Status: CANCELLED | OUTPATIENT
Start: 2024-04-09

## 2024-04-09 NOTE — TELEPHONE ENCOUNTER
Spoke with patient and scheduled. Per provider patient to hold ASA x7 days prior. Message sent to PCP for clearance. Pre op information given and follow up appointment scheduled.

## 2024-04-09 NOTE — TELEPHONE ENCOUNTER
Hi! Patient is scheduled for a lumbar DANNY on 5/1 with Dr. Santos. Provider requests patient hold ASA x 7 days prior to injection. Is patient clear to hold ASA for scheduled epidural injection? Thanks.

## 2024-04-30 ENCOUNTER — TELEPHONE (OUTPATIENT)
Dept: PAIN MEDICINE | Facility: CLINIC | Age: 64
End: 2024-04-30
Payer: MEDICARE

## 2024-04-30 NOTE — TELEPHONE ENCOUNTER
----- Message from Abigail Charles sent at 4/30/2024 12:37 PM CDT -----  Regarding: call time  Contact: pt  Type: Needs Medical Advice  Who Called:  patient   Symptoms (please be specific):  call time   How long has patient had these symptoms:    Pharmacy name and phone #:    Best Call Back Number: [phone]    Additional Information: procedure is tomorrow seeking the following thanks

## 2024-05-01 ENCOUNTER — HOSPITAL ENCOUNTER (OUTPATIENT)
Dept: RADIOLOGY | Facility: HOSPITAL | Age: 64
Discharge: HOME | End: 2024-05-01
Attending: ANESTHESIOLOGY | Admitting: ANESTHESIOLOGY
Payer: MEDICARE

## 2024-05-01 ENCOUNTER — HOSPITAL ENCOUNTER (OUTPATIENT)
Facility: HOSPITAL | Age: 64
Discharge: HOME | End: 2024-05-01
Attending: ANESTHESIOLOGY | Admitting: ANESTHESIOLOGY
Payer: MEDICARE

## 2024-05-01 VITALS
RESPIRATION RATE: 17 BRPM | WEIGHT: 297 LBS | SYSTOLIC BLOOD PRESSURE: 135 MMHG | BODY MASS INDEX: 45.01 KG/M2 | HEART RATE: 65 BPM | TEMPERATURE: 98 F | OXYGEN SATURATION: 98 % | DIASTOLIC BLOOD PRESSURE: 66 MMHG | HEIGHT: 68 IN

## 2024-05-01 DIAGNOSIS — M54.16 LUMBAR RADICULOPATHY: Primary | ICD-10-CM

## 2024-05-01 DIAGNOSIS — M54.50 LOWER BACK PAIN: ICD-10-CM

## 2024-05-01 LAB — GLUCOSE SERPL-MCNC: 117 MG/DL (ref 70–110)

## 2024-05-01 PROCEDURE — A4216 STERILE WATER/SALINE, 10 ML: HCPCS | Mod: PO | Performed by: ANESTHESIOLOGY

## 2024-05-01 PROCEDURE — 25000003 PHARM REV CODE 250: Mod: PO | Performed by: ANESTHESIOLOGY

## 2024-05-01 PROCEDURE — 63600175 PHARM REV CODE 636 W HCPCS: Mod: PO | Performed by: ANESTHESIOLOGY

## 2024-05-01 PROCEDURE — 62323 NJX INTERLAMINAR LMBR/SAC: CPT | Mod: ,,, | Performed by: ANESTHESIOLOGY

## 2024-05-01 PROCEDURE — 62323 NJX INTERLAMINAR LMBR/SAC: CPT | Mod: PO | Performed by: ANESTHESIOLOGY

## 2024-05-01 PROCEDURE — 76000 FLUOROSCOPY <1 HR PHYS/QHP: CPT | Mod: TC,PO

## 2024-05-01 PROCEDURE — 25500020 PHARM REV CODE 255: Mod: PO | Performed by: ANESTHESIOLOGY

## 2024-05-01 RX ORDER — METHYLPREDNISOLONE ACETATE 80 MG/ML
INJECTION, SUSPENSION INTRA-ARTICULAR; INTRALESIONAL; INTRAMUSCULAR; SOFT TISSUE
Status: DISCONTINUED | OUTPATIENT
Start: 2024-05-01 | End: 2024-05-01 | Stop reason: HOSPADM

## 2024-05-01 RX ORDER — MIDAZOLAM HYDROCHLORIDE 1 MG/ML
INJECTION INTRAMUSCULAR; INTRAVENOUS
Status: DISCONTINUED | OUTPATIENT
Start: 2024-05-01 | End: 2024-05-01 | Stop reason: HOSPADM

## 2024-05-01 RX ORDER — LIDOCAINE HYDROCHLORIDE 10 MG/ML
INJECTION, SOLUTION EPIDURAL; INFILTRATION; INTRACAUDAL; PERINEURAL
Status: DISCONTINUED | OUTPATIENT
Start: 2024-05-01 | End: 2024-05-01 | Stop reason: HOSPADM

## 2024-05-01 RX ORDER — SODIUM CHLORIDE 9 MG/ML
INJECTION, SOLUTION INTRAMUSCULAR; INTRAVENOUS; SUBCUTANEOUS
Status: DISCONTINUED | OUTPATIENT
Start: 2024-05-01 | End: 2024-05-01 | Stop reason: HOSPADM

## 2024-05-01 RX ORDER — SODIUM CHLORIDE, SODIUM LACTATE, POTASSIUM CHLORIDE, CALCIUM CHLORIDE 600; 310; 30; 20 MG/100ML; MG/100ML; MG/100ML; MG/100ML
INJECTION, SOLUTION INTRAVENOUS CONTINUOUS
Status: DISCONTINUED | OUTPATIENT
Start: 2024-05-01 | End: 2024-05-01 | Stop reason: HOSPADM

## 2024-05-01 RX ADMIN — SODIUM CHLORIDE, POTASSIUM CHLORIDE, SODIUM LACTATE AND CALCIUM CHLORIDE: 600; 310; 30; 20 INJECTION, SOLUTION INTRAVENOUS at 02:05

## 2024-05-01 NOTE — DISCHARGE SUMMARY
[facility]  Discharge Note  Short Stay    Admit Date: 5/1/2024    Discharge Date: 5/1/2024    Attending Physician: Pipo Santos     Discharge Provider: Pipo Santos    Diagnoses:  There are no hospital problems to display for this patient.      Discharged Condition: Good    Final Diagnoses: Lumbar radiculopathy [M54.16]    Disposition: Home or Self Care    Hospital Course: No complications, uneventful    Outcome of Hospitalization, Treatment, Procedure, or Surgery:  Patient was admitted for outpatient interventional pain management procedure. The patient tolerated the procedure well with no complications.    Follow up/Patient Instructions:  Follow up as scheduled in Pain Management office in 2-3 weeks.  Patient has received instructions and follow up date and time.    Medications:  Continue previous medications, except restart aspirin in 24 hours    Discharge Procedure Orders   Notify your health care provider if you experience any of the following:  temperature >100.4     Notify your health care provider if you experience any of the following:  persistent nausea and vomiting or diarrhea     Notify your health care provider if you experience any of the following:  severe uncontrolled pain     Notify your health care provider if you experience any of the following:  redness, tenderness, or signs of infection (pain, swelling, redness, odor or green/yellow discharge around incision site)     Notify your health care provider if you experience any of the following:  difficulty breathing or increased cough     Notify your health care provider if you experience any of the following:  severe persistent headache     Notify your health care provider if you experience any of the following:  worsening rash     Notify your health care provider if you experience any of the following:  persistent dizziness, light-headedness, or visual disturbances     Notify your health care provider if you experience any of the  following:  increased confusion or weakness     Activity as tolerated

## 2024-05-01 NOTE — INTERVAL H&P NOTE
The patient has been examined and the H&P has been reviewed:    I concur with the findings and no changes have occurred since H&P was written.  He has held aspirin appropriately.  ASA 3, MP II      There are no hospital problems to display for this patient.

## 2024-05-01 NOTE — OP NOTE
"Procedure Note    Procedure Date: 5/1/2024    Procedure Performed:  L5/S1 lumbar interlaminar epidural steroid injection under fluoroscopy.    Indications:  Oc Solis presents with lumbar radiculitis/radiculopathy secondary to disc herniation, osteophyte/osteophyte complexes, and/or severe degenerative disc disease producing foraminal or central spinal stenosis.  The pain has been present for at least 4 weeks and the patient has failed to respond to noninvasive conservative care.  Pain rated by NRS at baseline prior to intervention is 6/10.  Their radiculitis/radiculopathy and/or neurogenic claudication is severe enough to greatly impact their quality of life or function.     Pre-op diagnosis: Lumbar Radiculopathy    Post-op diagnosis: same    Physician: Pipo Santos MD    IV anxiolysis medications: versed 2mg    Medications injected: depomedrol 80mg, 1% Lidocaine 1ml, 2 mL sterile, preservative-free normal saline.    Local anesthetic used: 1% Lidocaine, 1 ml    Estimated Blood Loss: none    Complications:  none    Technique:  The patient was interviewed in the holding area and Risks/Benefits were discussed, including, but not limited to, the possibility of new or different pain, bleeding or infection.   All questions were answered.  The patient understood and accepted risks.  Consent was verfied.  A time-out was taken to identify patient and procedure prior to starting the procedure. The patient was placed in the prone position on the fluoroscopy table. The area of the lumbar spine was prepped with Chloraprep x2 and draped in a sterile manner. The L5/S1 interspace was identified and marked under AP fluoroscopy. The skin and subcutaneous tissues overlying the targeted interspace were anesthetized with 3-5 mL of 1% lidocaine using a 25G, 1.5" needle.  A 18G, 6" Tuohy epidural needle was directed toward the interspace under fluoroscopic guidance until the ligamentum flavum was engaged. From this point, a loss of " resistance technique with a glass syringe and saline was used to identify entrance of the needle into the epidural space. Once loss of resistance was observed 1 mL of contrast solution was injected. An appropriate epidurogram was noted.  A 4 mL mixture consisting of saline, 1 mL 1% Lidocaine and 80 mg of depomedrol was injected slowly and without resistance.  The needle was flushed with normal saline and removed. The contrast was seen to be displaced after injection. Patient was awake/responsive during all injections.  The patient tolerated the procedure well and was transferred to the [facility] in stable condition.  The patient was monitored after the procedure and was given post-procedure and discharge instructions to follow at home. The patient was discharged in a stable condition.

## 2024-05-08 ENCOUNTER — TELEPHONE (OUTPATIENT)
Dept: PAIN MEDICINE | Facility: CLINIC | Age: 64
End: 2024-05-08
Payer: MEDICARE

## 2024-05-20 DIAGNOSIS — G89.4 CHRONIC PAIN SYNDROME: ICD-10-CM

## 2024-05-20 DIAGNOSIS — M79.18 MYOFASCIAL PAIN: ICD-10-CM

## 2024-05-21 RX ORDER — TIZANIDINE 4 MG/1
4 TABLET ORAL 2 TIMES DAILY PRN
Qty: 90 TABLET | Refills: 0 | Status: SHIPPED | OUTPATIENT
Start: 2024-05-21

## 2024-05-21 RX ORDER — HYDROCODONE BITARTRATE AND ACETAMINOPHEN 7.5; 325 MG/1; MG/1
1 TABLET ORAL EVERY 12 HOURS PRN
Qty: 20 TABLET | Refills: 0 | Status: SHIPPED | OUTPATIENT
Start: 2024-05-21

## 2024-05-22 ENCOUNTER — OFFICE VISIT (OUTPATIENT)
Dept: PAIN MEDICINE | Facility: CLINIC | Age: 64
End: 2024-05-22
Payer: MEDICARE

## 2024-05-22 VITALS
WEIGHT: 293.88 LBS | BODY MASS INDEX: 44.68 KG/M2 | DIASTOLIC BLOOD PRESSURE: 80 MMHG | SYSTOLIC BLOOD PRESSURE: 143 MMHG | HEART RATE: 67 BPM

## 2024-05-22 DIAGNOSIS — M54.16 LUMBAR RADICULOPATHY: Primary | ICD-10-CM

## 2024-05-22 DIAGNOSIS — M48.061 SPINAL STENOSIS OF LUMBAR REGION, UNSPECIFIED WHETHER NEUROGENIC CLAUDICATION PRESENT: ICD-10-CM

## 2024-05-22 DIAGNOSIS — G89.4 CHRONIC PAIN SYNDROME: ICD-10-CM

## 2024-05-22 DIAGNOSIS — M47.816 LUMBAR SPONDYLOSIS: ICD-10-CM

## 2024-05-22 DIAGNOSIS — M79.18 MYOFASCIAL PAIN: ICD-10-CM

## 2024-05-22 DIAGNOSIS — M51.36 DISCOGENIC LOW BACK PAIN: ICD-10-CM

## 2024-05-22 DIAGNOSIS — M54.51 VERTEBROGENIC LOW BACK PAIN: ICD-10-CM

## 2024-05-22 PROCEDURE — 3008F BODY MASS INDEX DOCD: CPT | Mod: CPTII,S$GLB,,

## 2024-05-22 PROCEDURE — 3079F DIAST BP 80-89 MM HG: CPT | Mod: CPTII,S$GLB,,

## 2024-05-22 PROCEDURE — 99213 OFFICE O/P EST LOW 20 MIN: CPT | Mod: S$GLB,,,

## 2024-05-22 PROCEDURE — 1125F AMNT PAIN NOTED PAIN PRSNT: CPT | Mod: CPTII,S$GLB,,

## 2024-05-22 PROCEDURE — 1101F PT FALLS ASSESS-DOCD LE1/YR: CPT | Mod: CPTII,S$GLB,,

## 2024-05-22 PROCEDURE — 3077F SYST BP >= 140 MM HG: CPT | Mod: CPTII,S$GLB,,

## 2024-05-22 PROCEDURE — 4010F ACE/ARB THERAPY RXD/TAKEN: CPT | Mod: CPTII,S$GLB,,

## 2024-05-22 PROCEDURE — 99999 PR PBB SHADOW E&M-EST. PATIENT-LVL II: CPT | Mod: PBBFAC,,,

## 2024-05-22 PROCEDURE — 3044F HG A1C LEVEL LT 7.0%: CPT | Mod: CPTII,S$GLB,,

## 2024-05-22 PROCEDURE — 3288F FALL RISK ASSESSMENT DOCD: CPT | Mod: CPTII,S$GLB,,

## 2024-05-22 NOTE — PROGRESS NOTES
[facility] Follow Evaluation    Referred by: Donna Angeles NP  Reason for referral: neck pain    CC:   Chief Complaint   Patient presents with    Back Pain              5/22/2024     2:59 PM 4/8/2024     4:25 PM 3/8/2022     8:36 AM   Last 3 PDI Scores   Pain Disability Index (PDI) 19 25 46     Interval HPI 5/22/2024: Oc Solis returns to the office for follow up.  He is s/p L5/S1 DANNY on 05/01/2024 with 0% relief.  He reports continued lower back pain, across his lower back without radiation into his legs.  Some continued intermittent numbness throughout legs, improved with Lyrica.  He denies any new numbness, weakness or any new changes to his bowel or bladder function.      Pain Intervention History:  -s/p L5/S1 DANNY on 10/19/2022 with 50% relief   -s/p L5/S1 DANNY on 04/28/2023 with 65% relief.   -s/p L5/S1 DANNY on 05/01/2024 with 0% relief.    HPI:   Oc Solis is a 64 y.o. male who complains of neck pain.  He has been having worsening neck pain for the last month.  Today his pain is 8/10, constant, burning, numb, tingling with radiation down both arms to both hands.  He reports numbness and weakness.  He denies any bowel bladder control problems.  His pain is worse with laying, coughing sneezing, lifting, nighttime.    History:    Current Outpatient Medications:     diazePAM (VALIUM) 5 MG tablet, Take 1 tablet (5 mg total) by mouth nightly as needed for Anxiety., Disp: 30 tablet, Rfl: 1    furosemide (LASIX) 20 MG tablet, TAKE 1 TO 2 TABLETS BY MOUTH AS NEEDED, Disp: 50 tablet, Rfl: 3    HYDROcodone-acetaminophen (NORCO) 7.5-325 mg per tablet, Take 1 tablet by mouth every 12 (twelve) hours as needed for Pain., Disp: 20 tablet, Rfl: 0    lancets Misc, To check BG one times daily, to use with insurance preferred meter, Disp: 50 each, Rfl: 9    losartan-hydrochlorothiazide 100-12.5 mg (HYZAAR) 100-12.5 mg Tab, Take 1 tablet by mouth once daily., Disp: 90 tablet, Rfl: 3    metFORMIN (GLUCOPHAGE-XR)  500 MG ER 24hr tablet, TAKE 1 TABLET BY MOUTH TWICE A DAY WITH MEALS, Disp: 180 tablet, Rfl: 3    metoprolol tartrate (LOPRESSOR) 25 MG tablet, TAKE 1 TABLET BY MOUTH THREE TIMES A DAY, Disp: 270 tablet, Rfl: 1    omeprazole (PRILOSEC) 20 MG capsule, Take 1 capsule (20 mg total) by mouth daily as needed (Heartburn)., Disp: 30 capsule, Rfl: 11    pregabalin (LYRICA) 75 MG capsule, Take 1 capsule (75 mg total) by mouth 2 (two) times daily., Disp: 60 capsule, Rfl: 1    rosuvastatin (CRESTOR) 5 MG tablet, TAKE 1 TABLET BY MOUTH EVERY DAY, Disp: 90 tablet, Rfl: 1    tiZANidine (ZANAFLEX) 4 MG tablet, Take 1 tablet (4 mg total) by mouth 2 (two) times daily as needed (muscle spasms)., Disp: 90 tablet, Rfl: 0    Past Medical History:   Diagnosis Date    Anxiety     Asthma     childhood    Candidiasis of skin and nails     Hyperglycemia     Hyperlipidemia     Hypertension     Laceration     Neck pain     Rectal/anal hemorrhage     Unspecified adverse effect of unspecified drug, medicinal and biological substance        Past Surgical History:   Procedure Laterality Date    CERVICAL FUSION  07/2000    C6-7    COLONOSCOPY N/A 1/17/2017    Procedure: COLONOSCOPY;  Surgeon: Carmine Harrell Jr., MD;  Location: [facility];  Service: Endoscopy;  Laterality: N/A;    EPIDURAL STEROID INJECTION INTO LUMBAR SPINE N/A 10/19/2022    Procedure: Injection-steroid-epidural-lumbar L5/S1;  Surgeon: Pipo Santos MD;  Location: [facility] OR;  Service: Pain Management;  Laterality: N/A;    EPIDURAL STEROID INJECTION INTO LUMBAR SPINE N/A 4/28/2023    Procedure: Injection-steroid-epidural-lumbar L5/S1;  Surgeon: Pipo Santos MD;  Location: [facility] OR;  Service: Pain Management;  Laterality: N/A;    EPIDURAL STEROID INJECTION INTO LUMBAR SPINE N/A 5/1/2024    Procedure: Injection-steroid-epidural-lumbar  L5/S1;  Surgeon: Pipo Santos MD;  Location: [facility] OR;  Service: Pain Management;  Laterality: N/A;    FUSION OF POSTERIOR COLUMN OF CERVICAL  SPINE USING COMPUTER AIDED NAVIGATION N/A 4/29/2022    Procedure: FUSION, SPINE, POSTERIOR SPINAL COLUMN, CERVICAL,  COMPUTER-ASSISTED NAVIGATION - C3-4 C4-5 C5-6 C6-7 T1;  Surgeon: Miguel Mercado MD;  Location: [facility];  Service: Neurosurgery;  Laterality: N/A;    KYPHOSIS SURGERY  03/19/2013    POSTERIOR CERVICAL LAMINECTOMY  4/29/2022    Procedure: LAMINECTOMY, SPINE, CERVICAL, POSTERIOR APPROACH;  Surgeon: Miguel Mercado MD;  Location: [facility] OR;  Service: Neurosurgery;;  C3-T1       Family History   Problem Relation Name Age of Onset    Thyroid disease Mother      Cancer Mother          lung    Hyperlipidemia Father      Heart disease Father         Social History     Socioeconomic History    Marital status:    Tobacco Use    Smoking status: Never    Smokeless tobacco: Never   Substance and Sexual Activity    Alcohol use: Yes     Alcohol/week: 8.0 standard drinks of alcohol     Types: 6 Glasses of wine, 2 Cans of beer per week     Comment: 12 pack/white claws per week    Drug use: No    Sexual activity: Yes     Partners: Female       Review of patient's allergies indicates:   Allergen Reactions    Percocet [oxycodone-acetaminophen] Itching    Penicillins Rash       Review of Systems:  General ROS: negative for - fever  Psychological ROS: negative for - hostility  Hematological and Lymphatic ROS: negative for - bleeding problems  Endocrine ROS: negative for - unexpected weight changes  Respiratory ROS: no cough, shortness of breath, or wheezing  Cardiovascular ROS: no chest pain or dyspnea on exertion  Gastrointestinal ROS: no abdominal pain, change in bowel habits, or black or bloody stools  Musculoskeletal ROS: positive for - muscular weakness  Neurological ROS: positive for - numbness/tingling  negative for - bowel and bladder control changes  Dermatological ROS: negative for rash    Physical Exam:  Vitals:    05/22/24 1456   BP: (!) 143/80   Pulse: 67   Weight: 133.3 kg (293 lb 14 oz)   PainSc:    7   PainLoc: Back         Body mass index is 44.68 kg/m².    Gen: NAD  Gait: gait intact  Psych:  Mood appropriate for given condition  HEENT: eyes anicteric   GI: Abd soft  CV: RRR  Lungs: breathing unlabored   ROM: limited AROM of the L spine in all planes, full ROM at ankles, knees and hips  Sensation: intact to light touch in all dermatomes tested from L2-S1 bilaterally  Reflexes: 2+ b/l patella and achilles, biceps and triceps, plantar response down going   Palpation: Diffusely tender over lumbar paraspinals  -TTP over the b/l greater trochanters and bilateral SI joint  Tone: normal in the b/l knees and hips   Skin: intact  Extremities: No edema in b/l ankles or hands  Provacative tests:        Right Left   L2/3 Iliacus Hip flexion  5  5   L3/4 Qudratus Femoris Knee Extension  5  5   L4/5 Tib Anterior Ankle Dorsiflexion   5  5   L5/S1 Extensor Hallicus Longus Great toe extension  5  5                 S1/S2 Gastroc/Soleus Plantar Flexion  5  5       Imaging:  MRI cervical spine 8/9/22  FINDINGS:  Vertebral column: As seen on comparison plain films, there are postsurgical changes with posterior instrumented fusion and decompression.  Bilateral lateral mass screws are present at the C3, C4, C5 and C6 levels.  There are bilateral pedicle screws at the T1 level.  Hardware does result in some degree of susceptibility artifact.  There has been wide posterior decompression from C3 through C7.  There has been previous solid bony fusion of C6 and C7.  There is marked disc space narrowing at the C4-5 and C5-6 levels with at least moderate disc space narrowing at the C3-4 level.  There is stable mild anterolisthesis of C7 on T1.  The odontoid process is intact.     Spinal canal, cord, epidural space: The spinal canal appears to be developmentally normal.  The cord is normal in caliber and signal intensity.  Previously demonstrated multilevel spinal stenosis has been relieved.     Findings by level:     C2-3: There is  bilateral facet joint arthropathy with only minimal bulging of the annulus.  There is no spinal stenosis.  There is however moderate right and mild left foraminal stenosis without significant change.  C3-4: There is disc space narrowing, left greater than right uncovertebral spurring, right greater than left facet joint arthropathy.  There is a broad disc osteophyte complex.  There has been posterior decompression.  Therefore, there is no spinal stenosis.  There is at least moderate bilateral foraminal stenosis.  C4-5: There is marked disc space narrowing.  There is bilateral uncovertebral spurring and facet joint arthropathy.  There is a broad disc osteophyte complex.  Previously demonstrated spinal stenosis has been relieved secondary to posterior decompression.  There is moderate to severe bilateral foraminal stenosis without significant change.  C5-6: There is marked disc space narrowing.  There is bilateral uncovertebral spurring and left greater than right facet joint arthropathy.  There is no spinal stenosis status post decompression.  There is marked bilateral foraminal stenosis without significant change.  C6-7: There is solid bony fusion of C6 and C7.  There is right greater than left uncovertebral spurring.  There is no spinal stenosis.  There is marked right and at least moderate left foraminal stenosis without change.  C7-T1: There is stable anterolisthesis of C7 on T1.  There has been wide posterior decompression.  There is no longer spinal stenosis at this level.  There is facet joint arthropathy.  There is unroofing of a disc bulge.  There is at least moderate bilateral foraminal stenosis.  T1-2: There is marked left facet joint arthropathy contributing to moderate to severe left foraminal stenosis.     Soft tissues, other: There is a well-defined fluid collection within the posterior soft tissues, centered at the level of C7 and T1.  This measures approximately 1.6 x 5.1 x 5.2 cm (AP, transverse,  craniocaudad).  This likely represents a postoperative seroma/hematoma.  There is disruption in the posterior spinous soft tissue secondary to multilevel posterior decompression.    MRI thoracic spine 8/9/22  Impression:  1. There is a moderate disc protrusion at the T8-9 level which results in minimal flattening of the ventral cord surface.  There is no cord edema or myelomalacia but there is mild-to-moderate spinal stenosis.  2. There is some degree of foraminal narrowing present at several levels mostly due to facet joint arthropathy.  These findings are discussed in detail by level above.  3. There is a remote superior endplate compression deformity of T12 which has undergone kyphoplasty.  There is no acute fracture.    U/S RUE 3/4/22  Impression:  No thrombus in central veins of the right upper extremity.    MRI lumbar spine 8/4/22  FINDINGS:  Alignment: Mild levoconvex curvature of the lumbar spine.  Minimal retrolisthesis of L3 on L4 and mild grade 1 anterolisthesis of L4 on L5.  Lordosis is maintained.     Vertebral column: Moderate T12 superior endplate compression fracture status post kyphoplasty.  No associated marrow edema or significant retropulsion.  Remaining lumbar vertebral body heights are maintained without evidence of acute fracture or aggressive marrow placement process.  Multilevel disc degeneration, most pronounced at L5-S1 with moderate disc space narrowing.  Hypertrophied L5 left transverse process which pseudo articulates with the left sacral ala.     Cord: Normal.  Conus terminates at L1.     Degenerative findings:     T11-12 (limited sagittal): Posterior disc osteophyte complex and facet arthropathy, contributing to moderate right and mild left neural foraminal stenosis.  No significant spinal canal stenosis.  T12-L1: Mild diffuse disc bulge.  Mild bilateral facet arthropathy and ligamentum flavum thickening.  Mild bilateral neural foraminal stenosis.  L1-L2: Moderate disc space  narrowing.  Left asymmetric diffuse disc bulge with osteophytic ridging.  Mild bilateral facet arthropathy and ligamentum flavum thickening.  Moderate left and mild right neural foraminal stenosis.  Mild spinal canal stenosis.  L2-L3: Left asymmetric diffuse disc bulge with osteophytic ridging.  Moderate bilateral facet arthropathy.  Ligamentum flavum thickening.  Moderate left and mild-to-moderate right neural foraminal stenosis.  Moderate spinal canal stenosis.  L3-L4: Diffuse disc bulge with osteophytic ridging.  Moderate bilateral facet arthropathy and ligamentum flavum thickening.  Moderate bilateral neural foraminal stenosis.  Moderate spinal canal stenosis.  L4-L5: Anterolisthesis uncovering the disc.  Right asymmetric diffuse disc bulge.  Severe bilateral facet arthropathy.  Ligamentum flavum thickening.  Moderate bilateral neural foraminal stenosis.  Severe spinal canal stenosis.  L5-S1: Mild diffuse disc bulge.  Moderate right and mild left facet arthropathy.  Mild right neural foraminal stenosis.  There is no spinal canal stenosis.     Paraspinal muscles & soft tissues: No significant abnormalities.    Labs:  BMP  Lab Results   Component Value Date     (L) 03/03/2024    K 3.6 03/03/2024     03/03/2024    CO2 23 03/03/2024    BUN 12 03/03/2024    CREATININE 0.90 03/03/2024    CALCIUM 9.4 03/03/2024    ANIONGAP 11 03/03/2024    ESTGFRAFRICA >60 06/20/2022    EGFRNONAA >60 06/20/2022     Lab Results   Component Value Date    ALT 25 03/03/2024    AST 50 03/03/2024    ALKPHOS 74 03/03/2024    BILITOT 1.3 03/03/2024       Assessment:   Problem List Items Addressed This Visit          Neuro    Lumbar radiculopathy - Primary     Other Visit Diagnoses       Myofascial pain        Spinal stenosis of lumbar region, unspecified whether neurogenic claudication present        Chronic pain syndrome        Lumbar spondylosis        Vertebrogenic low back pain        Discogenic low back pain                     64 y.o. year old male with PMH hypertension who complains of neck pain.  He has been having worsening neck pain for the last month.  Today his pain is 8/10, constant, burning, numb, tingling with radiation down both arms to both hands.  He reports numbness and weakness.  He denies any bowel bladder control problems.  His pain is worse with laying, coughing sneezing, lifting, nighttime.  History of C6-7 bony fusion in 2000.    5/22/2024: Oc Solis returns to the office for follow up.  He is s/p L5/S1 DANNY on 05/01/2024 with 0% relief.  He reports continued lower back pain, across his lower back without radiation into his legs.  Some continued intermittent numbness throughout legs, improved with Lyrica.  He denies any new numbness, weakness or any new changes to his bowel or bladder function.      - on exam he is full strength and intact sensation to light touch bilateral L2-S1  -  He is s/p L5/S1 DANNY on 05/01/2024 with 0% relief.  - I independently reviewed his lumbar MRI and he has severe central canal narrowing at L4-5, multilevel facet arthropathy, it appears to have early stages of Modic endplate changes.  - unfortunately he found no significant relief with the lumbar epidural.  He continues to have significant lower back pain that is limiting his mobility interfering with his ADLs and quality of life.  - at this time I recommended updating his lumbar MRI however he would like to see how his pain progresses prior to considering this.  - he will follow up as needed.  In the future will order updated MRI, can consider adjusting any axial facet mediated pain.  Additionally, he may be having some worsening vertebral genetic or discogenic low back pain, can be consideration for intercept or via disc.  - he is requesting IV sedation in the future.  - he continues to take hydrocodone sparingly for severe pain.      : Not applicable      This note was completed with dictation software and grammatical  errors may exist.

## 2024-06-23 DIAGNOSIS — G89.4 CHRONIC PAIN SYNDROME: ICD-10-CM

## 2024-06-25 RX ORDER — PREGABALIN 75 MG/1
75 CAPSULE ORAL 2 TIMES DAILY
Qty: 60 CAPSULE | Refills: 1 | Status: SHIPPED | OUTPATIENT
Start: 2024-06-25 | End: 2024-08-24

## 2024-07-12 DIAGNOSIS — M79.18 MYOFASCIAL PAIN: ICD-10-CM

## 2024-07-12 RX ORDER — TIZANIDINE 4 MG/1
4 TABLET ORAL 2 TIMES DAILY PRN
Qty: 90 TABLET | Refills: 0 | Status: SHIPPED | OUTPATIENT
Start: 2024-07-12

## 2024-08-01 DIAGNOSIS — M79.18 MYOFASCIAL PAIN: ICD-10-CM

## 2024-08-01 RX ORDER — TIZANIDINE 4 MG/1
4 TABLET ORAL 2 TIMES DAILY PRN
Qty: 90 TABLET | Refills: 0 | OUTPATIENT
Start: 2024-08-01

## 2024-08-05 ENCOUNTER — TELEPHONE (OUTPATIENT)
Dept: PAIN MEDICINE | Facility: CLINIC | Age: 64
End: 2024-08-05
Payer: MEDICARE

## 2024-08-05 DIAGNOSIS — M79.18 MYOFASCIAL PAIN: ICD-10-CM

## 2024-08-06 RX ORDER — TIZANIDINE 4 MG/1
4 TABLET ORAL 2 TIMES DAILY PRN
Qty: 90 TABLET | Refills: 0 | Status: SHIPPED | OUTPATIENT
Start: 2024-08-06

## 2024-08-08 ENCOUNTER — TELEPHONE (OUTPATIENT)
Dept: PAIN MEDICINE | Facility: CLINIC | Age: 64
End: 2024-08-08
Payer: MEDICARE

## 2024-10-10 ENCOUNTER — OFFICE VISIT (OUTPATIENT)
Dept: PAIN MEDICINE | Facility: CLINIC | Age: 64
End: 2024-10-10
Payer: MEDICARE

## 2024-10-10 ENCOUNTER — HOSPITAL ENCOUNTER (OUTPATIENT)
Dept: RADIOLOGY | Facility: HOSPITAL | Age: 64
Discharge: HOME | End: 2024-10-10
Payer: MEDICARE

## 2024-10-10 ENCOUNTER — TELEPHONE (OUTPATIENT)
Dept: PAIN MEDICINE | Facility: CLINIC | Age: 64
End: 2024-10-10

## 2024-10-10 VITALS
HEART RATE: 55 BPM | DIASTOLIC BLOOD PRESSURE: 82 MMHG | BODY MASS INDEX: 45.63 KG/M2 | WEIGHT: 301.06 LBS | HEIGHT: 68 IN | SYSTOLIC BLOOD PRESSURE: 193 MMHG

## 2024-10-10 DIAGNOSIS — M54.9 DORSALGIA: ICD-10-CM

## 2024-10-10 DIAGNOSIS — M79.18 MYOFASCIAL PAIN: ICD-10-CM

## 2024-10-10 DIAGNOSIS — M48.061 SPINAL STENOSIS OF LUMBAR REGION, UNSPECIFIED WHETHER NEUROGENIC CLAUDICATION PRESENT: ICD-10-CM

## 2024-10-10 DIAGNOSIS — M79.18 MYOFASCIAL PAIN: Primary | ICD-10-CM

## 2024-10-10 DIAGNOSIS — G89.4 CHRONIC PAIN SYNDROME: ICD-10-CM

## 2024-10-10 DIAGNOSIS — M54.14 THORACIC RADICULOPATHY: ICD-10-CM

## 2024-10-10 DIAGNOSIS — M47.816 LUMBAR SPONDYLOSIS: ICD-10-CM

## 2024-10-10 PROCEDURE — 3061F NEG MICROALBUMINURIA REV: CPT | Mod: CPTII,S$GLB,,

## 2024-10-10 PROCEDURE — 3066F NEPHROPATHY DOC TX: CPT | Mod: CPTII,S$GLB,,

## 2024-10-10 PROCEDURE — 99214 OFFICE O/P EST MOD 30 MIN: CPT | Mod: S$GLB,,,

## 2024-10-10 PROCEDURE — 3044F HG A1C LEVEL LT 7.0%: CPT | Mod: CPTII,S$GLB,,

## 2024-10-10 PROCEDURE — 1125F AMNT PAIN NOTED PAIN PRSNT: CPT | Mod: CPTII,S$GLB,,

## 2024-10-10 PROCEDURE — 3079F DIAST BP 80-89 MM HG: CPT | Mod: CPTII,S$GLB,,

## 2024-10-10 PROCEDURE — 71046 X-RAY EXAM CHEST 2 VIEWS: CPT | Mod: 26,,, | Performed by: STUDENT IN AN ORGANIZED HEALTH CARE EDUCATION/TRAINING PROGRAM

## 2024-10-10 PROCEDURE — 72074 X-RAY EXAM THORAC SPINE4/>VW: CPT | Mod: TC,PO

## 2024-10-10 PROCEDURE — 1101F PT FALLS ASSESS-DOCD LE1/YR: CPT | Mod: CPTII,S$GLB,,

## 2024-10-10 PROCEDURE — 72074 X-RAY EXAM THORAC SPINE4/>VW: CPT | Mod: 26,,, | Performed by: RADIOLOGY

## 2024-10-10 PROCEDURE — 72110 X-RAY EXAM L-2 SPINE 4/>VWS: CPT | Mod: 26,,, | Performed by: RADIOLOGY

## 2024-10-10 PROCEDURE — 3008F BODY MASS INDEX DOCD: CPT | Mod: CPTII,S$GLB,,

## 2024-10-10 PROCEDURE — 4010F ACE/ARB THERAPY RXD/TAKEN: CPT | Mod: CPTII,S$GLB,,

## 2024-10-10 PROCEDURE — 1159F MED LIST DOCD IN RCRD: CPT | Mod: CPTII,S$GLB,,

## 2024-10-10 PROCEDURE — 72110 X-RAY EXAM L-2 SPINE 4/>VWS: CPT | Mod: TC,PO

## 2024-10-10 PROCEDURE — 3288F FALL RISK ASSESSMENT DOCD: CPT | Mod: CPTII,S$GLB,,

## 2024-10-10 PROCEDURE — 71046 X-RAY EXAM CHEST 2 VIEWS: CPT | Mod: TC,PO

## 2024-10-10 PROCEDURE — 99999 PR PBB SHADOW E&M-EST. PATIENT-LVL III: CPT | Mod: PBBFAC,,,

## 2024-10-10 PROCEDURE — 3077F SYST BP >= 140 MM HG: CPT | Mod: CPTII,S$GLB,,

## 2024-10-10 RX ORDER — HYDROCODONE BITARTRATE AND ACETAMINOPHEN 7.5; 325 MG/1; MG/1
1 TABLET ORAL EVERY 12 HOURS PRN
Qty: 20 TABLET | Refills: 0 | Status: CANCELLED | OUTPATIENT
Start: 2024-10-10

## 2024-10-10 RX ORDER — TIZANIDINE 4 MG/1
4 TABLET ORAL 2 TIMES DAILY PRN
Qty: 90 TABLET | Refills: 0 | Status: SHIPPED | OUTPATIENT
Start: 2024-10-10 | End: 2024-10-10 | Stop reason: SDUPTHER

## 2024-10-10 RX ORDER — TIZANIDINE 4 MG/1
4 TABLET ORAL 2 TIMES DAILY PRN
Qty: 90 TABLET | Refills: 0 | Status: SHIPPED | OUTPATIENT
Start: 2024-10-10

## 2024-10-10 RX ORDER — METHYLPREDNISOLONE 4 MG/1
TABLET ORAL
Qty: 21 EACH | Refills: 0 | Status: SHIPPED | OUTPATIENT
Start: 2024-10-10 | End: 2024-10-31

## 2024-10-10 RX ORDER — PREGABALIN 75 MG/1
75 CAPSULE ORAL 2 TIMES DAILY
Qty: 60 CAPSULE | Refills: 1 | Status: CANCELLED | OUTPATIENT
Start: 2024-10-10 | End: 2024-12-09

## 2024-10-10 RX ORDER — PREGABALIN 75 MG/1
75 CAPSULE ORAL 2 TIMES DAILY
Qty: 60 CAPSULE | Refills: 1 | Status: SHIPPED | OUTPATIENT
Start: 2024-10-10 | End: 2024-12-09

## 2024-10-10 RX ORDER — HYDROCODONE BITARTRATE AND ACETAMINOPHEN 7.5; 325 MG/1; MG/1
1 TABLET ORAL EVERY 12 HOURS PRN
Qty: 20 TABLET | Refills: 0 | Status: SHIPPED | OUTPATIENT
Start: 2024-10-10

## 2024-10-10 NOTE — PROGRESS NOTES
[facility] Follow Evaluation    Referred by: Donna Angeles NP  Reason for referral: neck pain    CC:   Chief Complaint   Patient presents with    Medication Refill    Back Pain    Follow-up              10/10/2024     1:17 PM 5/22/2024     2:59 PM 4/8/2024     4:25 PM   Last 3 PDI Scores   Pain Disability Index (PDI) 50 19 25     Interval HPI 10/10/2024: Oc Solis returns to the office for follow up.  He reports over the past 2 weeks he has had worsening right-sided lower back pain and right-sided midback pain, 10/10, constant, worsened with coughing, laughing, movement.  He denies any known trauma.  Pain can radiate into right anterior chest.  No pain radiating down his legs, new numbness, weakness or any new changes to his bowel bladder function.  He has been taking pain medication, Lyrica, tizanidine Tylenol without significant relief of his pain.      Pain Intervention History:  -s/p L5/S1 DANNY on 10/19/2022 with 50% relief   -s/p L5/S1 DANNY on 04/28/2023 with 65% relief.   -s/p L5/S1 DANNY on 05/01/2024 with 0% relief.    HPI:   Oc Solis is a 64 y.o. male who complains of neck pain.  He has been having worsening neck pain for the last month.  Today his pain is 8/10, constant, burning, numb, tingling with radiation down both arms to both hands.  He reports numbness and weakness.  He denies any bowel bladder control problems.  His pain is worse with laying, coughing sneezing, lifting, nighttime.    History:    Current Outpatient Medications:     diazePAM (VALIUM) 5 MG tablet, Take 1 tablet (5 mg total) by mouth nightly as needed for Anxiety., Disp: 30 tablet, Rfl: 1    furosemide (LASIX) 20 MG tablet, TAKE 1 TO 2 TABLETS BY MOUTH AS NEEDED (Patient not taking: Reported on 7/3/2024), Disp: 50 tablet, Rfl: 3    HYDROcodone-acetaminophen (NORCO) 7.5-325 mg per tablet, Take 1 tablet by mouth every 12 (twelve) hours as needed for Pain. (Patient not taking: Reported on 7/3/2024), Disp: 20 tablet,  Rfl: 0    lancets Misc, To check BG one times daily, to use with insurance preferred meter, Disp: 50 each, Rfl: 9    losartan (COZAAR) 100 MG tablet, Take 1 tablet (100 mg total) by mouth once daily., Disp: 90 tablet, Rfl: 3    metFORMIN (GLUCOPHAGE-XR) 500 MG ER 24hr tablet, TAKE 1 TABLET BY MOUTH TWICE A DAY WITH MEALS, Disp: 180 tablet, Rfl: 3    methylPREDNISolone (MEDROL DOSEPACK) 4 mg tablet, use as directed, Disp: 21 each, Rfl: 0    metoprolol tartrate (LOPRESSOR) 25 MG tablet, TAKE 1 TABLET BY MOUTH THREE TIMES A DAY, Disp: 270 tablet, Rfl: 1    omeprazole (PRILOSEC) 20 MG capsule, Take 1 capsule (20 mg total) by mouth daily as needed (Heartburn)., Disp: 30 capsule, Rfl: 11    pregabalin (LYRICA) 75 MG capsule, Take 1 capsule (75 mg total) by mouth 2 (two) times daily. (Patient taking differently: Take 75 mg by mouth once daily.), Disp: 60 capsule, Rfl: 1    rosuvastatin (CRESTOR) 5 MG tablet, TAKE 1 TABLET BY MOUTH EVERY DAY, Disp: 90 tablet, Rfl: 1    tiZANidine (ZANAFLEX) 4 MG tablet, Take 1 tablet (4 mg total) by mouth 2 (two) times daily as needed (muscle spasms)., Disp: 90 tablet, Rfl: 0    Past Medical History:   Diagnosis Date    Anxiety     Asthma     childhood    Candidiasis of skin and nails     Hyperglycemia     Hyperlipidemia     Hypertension     Laceration     Neck pain     Rectal/anal hemorrhage     Unspecified adverse effect of unspecified drug, medicinal and biological substance        Past Surgical History:   Procedure Laterality Date    CERVICAL FUSION  07/2000    C6-7    COLONOSCOPY N/A 1/17/2017    Procedure: COLONOSCOPY;  Surgeon: Carmine Harrell Jr., MD;  Location: [facility] ENDO;  Service: Endoscopy;  Laterality: N/A;    EPIDURAL STEROID INJECTION INTO LUMBAR SPINE N/A 10/19/2022    Procedure: Injection-steroid-epidural-lumbar L5/S1;  Surgeon: Pipo Santos MD;  Location: [facility] OR;  Service: Pain Management;  Laterality: N/A;    EPIDURAL STEROID INJECTION INTO LUMBAR SPINE N/A 4/28/2023     Procedure: Injection-steroid-epidural-lumbar L5/S1;  Surgeon: Pipo Santos MD;  Location: [facility] OR;  Service: Pain Management;  Laterality: N/A;    EPIDURAL STEROID INJECTION INTO LUMBAR SPINE N/A 5/1/2024    Procedure: Injection-steroid-epidural-lumbar  L5/S1;  Surgeon: Pipo Santos MD;  Location: [facility] OR;  Service: Pain Management;  Laterality: N/A;    FUSION OF POSTERIOR COLUMN OF CERVICAL SPINE USING COMPUTER AIDED NAVIGATION N/A 4/29/2022    Procedure: FUSION, SPINE, POSTERIOR SPINAL COLUMN, CERVICAL,  COMPUTER-ASSISTED NAVIGATION - C3-4 C4-5 C5-6 C6-7 T1;  Surgeon: Miguel Mercado MD;  Location: [facility] OR;  Service: Neurosurgery;  Laterality: N/A;    KYPHOSIS SURGERY  03/19/2013    POSTERIOR CERVICAL LAMINECTOMY  4/29/2022    Procedure: LAMINECTOMY, SPINE, CERVICAL, POSTERIOR APPROACH;  Surgeon: Miguel Mercado MD;  Location: [facility] OR;  Service: Neurosurgery;;  C3-T1       Family History   Problem Relation Name Age of Onset    Thyroid disease Mother      Cancer Mother          lung    Hyperlipidemia Father      Heart disease Father         Social History     Socioeconomic History    Marital status:    Tobacco Use    Smoking status: Never    Smokeless tobacco: Never   Substance and Sexual Activity    Alcohol use: Yes     Alcohol/week: 8.0 standard drinks of alcohol     Types: 6 Glasses of wine, 2 Cans of beer per week     Comment: 12 pack/white claws per week    Drug use: No    Sexual activity: Yes     Partners: Female       Review of patient's allergies indicates:   Allergen Reactions    Percocet [oxycodone-acetaminophen] Itching    Penicillins Rash       Review of Systems:  General ROS: negative for - fever  Psychological ROS: negative for - hostility  Hematological and Lymphatic ROS: negative for - bleeding problems  Endocrine ROS: negative for - unexpected weight changes  Respiratory ROS: no cough, shortness of breath, or wheezing  Cardiovascular ROS: no chest pain or dyspnea on  "exertion  Gastrointestinal ROS: no abdominal pain, change in bowel habits, or black or bloody stools  Musculoskeletal ROS: positive for - muscular weakness  Neurological ROS: positive for - numbness/tingling  negative for - bowel and bladder control changes  Dermatological ROS: negative for rash    Physical Exam:  Vitals:    10/10/24 1317   BP: (!) 193/82   Pulse: (!) 55   Weight: (!) 136.5 kg (301 lb 0.6 oz)   Height: 5' 8" (1.727 m)   PainSc: 10-Worst pain ever   PainLoc: Back           Body mass index is 45.77 kg/m².    Gen: NAD  Gait: gait intact  Psych:  Mood appropriate for given condition  HEENT: eyes anicteric   GI: Abd soft  CV: RRR  Lungs: breathing unlabored   ROM: limited AROM of the L spine in all planes, full ROM at ankles, knees and hips  Sensation: intact to light touch in all dermatomes tested from L2-S1 bilaterally  Reflexes: 2+ b/l patella and achilles, biceps and triceps, plantar response down going   Palpation: Diffusely tender over lumbar paraspinals  -TTP over the b/l greater trochanters and bilateral SI joint  Tone: normal in the b/l knees and hips   Skin: intact  Extremities: No edema in b/l ankles or hands  Provacative tests:        Right Left   L2/3 Iliacus Hip flexion  5  5   L3/4 Qudratus Femoris Knee Extension  5  5   L4/5 Tib Anterior Ankle Dorsiflexion   5  5   L5/S1 Extensor Hallicus Longus Great toe extension  5  5                 S1/S2 Gastroc/Soleus Plantar Flexion  5  5       Imaging:  MRI cervical spine 8/9/22  FINDINGS:  Vertebral column: As seen on comparison plain films, there are postsurgical changes with posterior instrumented fusion and decompression.  Bilateral lateral mass screws are present at the C3, C4, C5 and C6 levels.  There are bilateral pedicle screws at the T1 level.  Hardware does result in some degree of susceptibility artifact.  There has been wide posterior decompression from C3 through C7.  There has been previous solid bony fusion of C6 and C7.  There is " marked disc space narrowing at the C4-5 and C5-6 levels with at least moderate disc space narrowing at the C3-4 level.  There is stable mild anterolisthesis of C7 on T1.  The odontoid process is intact.     Spinal canal, cord, epidural space: The spinal canal appears to be developmentally normal.  The cord is normal in caliber and signal intensity.  Previously demonstrated multilevel spinal stenosis has been relieved.     Findings by level:     C2-3: There is bilateral facet joint arthropathy with only minimal bulging of the annulus.  There is no spinal stenosis.  There is however moderate right and mild left foraminal stenosis without significant change.  C3-4: There is disc space narrowing, left greater than right uncovertebral spurring, right greater than left facet joint arthropathy.  There is a broad disc osteophyte complex.  There has been posterior decompression.  Therefore, there is no spinal stenosis.  There is at least moderate bilateral foraminal stenosis.  C4-5: There is marked disc space narrowing.  There is bilateral uncovertebral spurring and facet joint arthropathy.  There is a broad disc osteophyte complex.  Previously demonstrated spinal stenosis has been relieved secondary to posterior decompression.  There is moderate to severe bilateral foraminal stenosis without significant change.  C5-6: There is marked disc space narrowing.  There is bilateral uncovertebral spurring and left greater than right facet joint arthropathy.  There is no spinal stenosis status post decompression.  There is marked bilateral foraminal stenosis without significant change.  C6-7: There is solid bony fusion of C6 and C7.  There is right greater than left uncovertebral spurring.  There is no spinal stenosis.  There is marked right and at least moderate left foraminal stenosis without change.  C7-T1: There is stable anterolisthesis of C7 on T1.  There has been wide posterior decompression.  There is no longer spinal  stenosis at this level.  There is facet joint arthropathy.  There is unroofing of a disc bulge.  There is at least moderate bilateral foraminal stenosis.  T1-2: There is marked left facet joint arthropathy contributing to moderate to severe left foraminal stenosis.     Soft tissues, other: There is a well-defined fluid collection within the posterior soft tissues, centered at the level of C7 and T1.  This measures approximately 1.6 x 5.1 x 5.2 cm (AP, transverse, craniocaudad).  This likely represents a postoperative seroma/hematoma.  There is disruption in the posterior spinous soft tissue secondary to multilevel posterior decompression.    MRI thoracic spine 8/9/22  Impression:  1. There is a moderate disc protrusion at the T8-9 level which results in minimal flattening of the ventral cord surface.  There is no cord edema or myelomalacia but there is mild-to-moderate spinal stenosis.  2. There is some degree of foraminal narrowing present at several levels mostly due to facet joint arthropathy.  These findings are discussed in detail by level above.  3. There is a remote superior endplate compression deformity of T12 which has undergone kyphoplasty.  There is no acute fracture.    U/S RUE 3/4/22  Impression:  No thrombus in central veins of the right upper extremity.    MRI lumbar spine 8/4/22  FINDINGS:  Alignment: Mild levoconvex curvature of the lumbar spine.  Minimal retrolisthesis of L3 on L4 and mild grade 1 anterolisthesis of L4 on L5.  Lordosis is maintained.     Vertebral column: Moderate T12 superior endplate compression fracture status post kyphoplasty.  No associated marrow edema or significant retropulsion.  Remaining lumbar vertebral body heights are maintained without evidence of acute fracture or aggressive marrow placement process.  Multilevel disc degeneration, most pronounced at L5-S1 with moderate disc space narrowing.  Hypertrophied L5 left transverse process which pseudo articulates with the  left sacral ala.     Cord: Normal.  Conus terminates at L1.     Degenerative findings:     T11-12 (limited sagittal): Posterior disc osteophyte complex and facet arthropathy, contributing to moderate right and mild left neural foraminal stenosis.  No significant spinal canal stenosis.  T12-L1: Mild diffuse disc bulge.  Mild bilateral facet arthropathy and ligamentum flavum thickening.  Mild bilateral neural foraminal stenosis.  L1-L2: Moderate disc space narrowing.  Left asymmetric diffuse disc bulge with osteophytic ridging.  Mild bilateral facet arthropathy and ligamentum flavum thickening.  Moderate left and mild right neural foraminal stenosis.  Mild spinal canal stenosis.  L2-L3: Left asymmetric diffuse disc bulge with osteophytic ridging.  Moderate bilateral facet arthropathy.  Ligamentum flavum thickening.  Moderate left and mild-to-moderate right neural foraminal stenosis.  Moderate spinal canal stenosis.  L3-L4: Diffuse disc bulge with osteophytic ridging.  Moderate bilateral facet arthropathy and ligamentum flavum thickening.  Moderate bilateral neural foraminal stenosis.  Moderate spinal canal stenosis.  L4-L5: Anterolisthesis uncovering the disc.  Right asymmetric diffuse disc bulge.  Severe bilateral facet arthropathy.  Ligamentum flavum thickening.  Moderate bilateral neural foraminal stenosis.  Severe spinal canal stenosis.  L5-S1: Mild diffuse disc bulge.  Moderate right and mild left facet arthropathy.  Mild right neural foraminal stenosis.  There is no spinal canal stenosis.     Paraspinal muscles & soft tissues: No significant abnormalities.    Labs:  BMP  Lab Results   Component Value Date     07/09/2024    K 4.3 07/09/2024     07/09/2024    CO2 29 07/09/2024    BUN 15 07/09/2024    CREATININE 0.93 07/09/2024    CALCIUM 8.9 07/09/2024    ANIONGAP 5 07/09/2024    ESTGFRAFRICA >60 06/20/2022    EGFRNONAA >60 06/20/2022     Lab Results   Component Value Date    ALT 20 07/09/2024    AST  33 07/09/2024    ALKPHOS 80 07/09/2024    BILITOT 1.5 (H) 07/09/2024       Assessment:   Problem List Items Addressed This Visit    None  Visit Diagnoses       Myofascial pain    -  Primary    Relevant Medications    tiZANidine (ZANAFLEX) 4 MG tablet    Chronic pain syndrome        Dorsalgia        Relevant Medications    methylPREDNISolone (MEDROL DOSEPACK) 4 mg tablet    Other Relevant Orders    X-Ray Chest PA And Lateral (Completed)    X-Ray Thoracic Spine 4 or more views (Completed)    X-Ray Lumbar Spine 5 View (Completed)    Spinal stenosis of lumbar region, unspecified whether neurogenic claudication present        Lumbar spondylosis        Thoracic radiculopathy                      64 y.o. year old male with PMH hypertension who complains of neck pain.  He has been having worsening neck pain for the last month.  Today his pain is 8/10, constant, burning, numb, tingling with radiation down both arms to both hands.  He reports numbness and weakness.  He denies any bowel bladder control problems.  His pain is worse with laying, coughing sneezing, lifting, nighttime.  History of C6-7 bony fusion in 2000.    10/10/2024: Oc Solis returns to the office for follow up.  He reports over the past 2 weeks he has had worsening right-sided lower back pain and right-sided midback pain, 10/10, constant, worsened with coughing, laughing, movement.  He denies any known trauma.  Pain can radiate into right anterior chest.  No pain radiating down his legs, new numbness, weakness or any new changes to his bowel bladder function.  He has been taking pain medication, Lyrica, tizanidine Tylenol without significant relief of his pain.      - on exam he is full strength in his upper and lower extremities.  Some moderate tenderness to palpation of his right-sided thoracic paraspinals.  - I independently reviewed his lumbar MRI and he has severe central canal narrowing at L4-5, multilevel facet arthropathy, it appears to have  early stages of Modic endplate changes.  - we reviewed his thoracic MRI in office today as well and is consistent with moderate disc protrusion at the T8-9 level which results in minimal flattening of the ventral cord surface.  - unsure what is causing his pain, likely combination of myofascial pain and perhaps some pain from the protrusion at T8/9.  - updated x-ray of thoracic spine, chest and lumbar spine ordered today for further evaluation.  - oral Medrol Dosepak provided today for any inflammatory component.  - refills for tizanidine, hydrocodone 5/325 mg and Lyrica provided today as well.  - based on x-rays if no acute findings seen with bony anatomy can consider getting updated MRI of thoracic spine to ensure no worsening changes to his neuro anatomy.    : Not applicable      This note was completed with dictation software and grammatical errors may exist.

## 2024-10-10 NOTE — TELEPHONE ENCOUNTER
Please let patient know there were no acute findings on his updated x-rays.  At this time I would like to see how he progresses with the oral Medrol Dosepak.    If after completing the oral Medrol Dosepak pain persists have him reach out to us and we will order the MRI of his thoracic spine.

## 2024-10-16 ENCOUNTER — TELEPHONE (OUTPATIENT)
Dept: PAIN MEDICINE | Facility: CLINIC | Age: 64
End: 2024-10-16
Payer: MEDICARE

## 2024-10-16 NOTE — TELEPHONE ENCOUNTER
----- Message from Srikanth sent at 10/16/2024  9:39 AM CDT -----  Contact: oc at [phone]  Type: Needs Medical Advice    Who Called:  Oc Ceron Call Back Number: [phone]    Additional Information: pt is calling to discuss how prednisone worked from 10/10/24 appt.

## 2024-10-17 ENCOUNTER — PATIENT MESSAGE (OUTPATIENT)
Dept: PAIN MEDICINE | Facility: CLINIC | Age: 64
End: 2024-10-17
Payer: MEDICARE

## 2024-10-17 ENCOUNTER — TELEPHONE (OUTPATIENT)
Dept: PAIN MEDICINE | Facility: CLINIC | Age: 64
End: 2024-10-17
Payer: MEDICARE

## 2024-10-17 NOTE — TELEPHONE ENCOUNTER
Spoke with patient and he stated his pain is back, just not as severe as clinic visit on 10/10. His pain is on right side of chest and upper back. States pain is 5/10 when he coughs, or exerts himself, and a 0/10 when resting. Patient says he can't sleep in his bed, so he's been in the recliner. He finished his steroid pack, and takes his norco when he goes to sleep. He doesn't want a pain pill. He states he doesn't want an MRI right now, but is requesting another steroid pack if possible. Told him I would let Scot know, but advised him to also follow up with PCP since he is declining MRI, and he voiced understanding.     ----- Message from Jaimee sent at 10/17/2024  9:11 AM CDT -----  Type:  Needs Medical Advice    Who Called:  Pt    Would the patient rather a call back or a response via [facility]?  Call back    Best Call Back Number:  [phone]    Additional Information:  Pt is calling because the pain started back this morning. Asking to speak with someone.   Please call back to advise. Thanks!

## 2024-10-17 NOTE — TELEPHONE ENCOUNTER
I spoke with patient he does not want to have a MRI he would like to speak with his PCP first and will let us know.

## 2024-10-17 NOTE — TELEPHONE ENCOUNTER
I do not recommend a repeat Medrol Dosepak this soon.  Recommend considering getting the MRI for more information.  Otherwise, physical therapy and giving it some more time.

## 2025-03-01 DIAGNOSIS — G89.4 CHRONIC PAIN SYNDROME: ICD-10-CM

## 2025-03-03 RX ORDER — PREGABALIN 75 MG/1
75 CAPSULE ORAL 2 TIMES DAILY
Qty: 60 CAPSULE | Refills: 1 | Status: SHIPPED | OUTPATIENT
Start: 2025-03-03 | End: 2025-05-02

## 2025-05-12 ENCOUNTER — TELEPHONE (OUTPATIENT)
Dept: PAIN MEDICINE | Facility: CLINIC | Age: 65
End: 2025-05-12
Payer: MEDICARE

## 2025-05-12 NOTE — TELEPHONE ENCOUNTER
Spoke with pt who stated that he does not want a MRI he will come in and discuss other possible treatments with provider. Pt's in clinic f/u 5/29

## 2025-05-12 NOTE — TELEPHONE ENCOUNTER
----- Message from Karen sent at 5/12/2025 11:45 AM CDT -----  Contact: pt  Type:  RX Refill RequestWho Called:  PTRefill or New Rx:   REFILLRX Name and Strength:   HYDROcodone-acetaminophen (NORCO) 5-325 mg per tabletHow is the patient currently taking it? (ex. 1XDay):  AS ORDEREDIs this a 30 day or 90 day RX:  30Preferred Pharmacy with phone number:  [id number] - [place] [address] [address] AT [address] [address]Enzqp: [phone] Fax: [phone] or Mail Order:  LOCALOrdering Provider:  Jacinto Call Back Number:  [phone] Information:  Pt said he called over a week ago for a refill but never heard anything. Pt said he is in pain daily and asking for it to be called in ASAP.

## 2025-06-26 DIAGNOSIS — G89.4 CHRONIC PAIN SYNDROME: ICD-10-CM

## 2025-06-26 RX ORDER — PREGABALIN 75 MG/1
75 CAPSULE ORAL 2 TIMES DAILY
Qty: 60 CAPSULE | Refills: 1 | Status: SHIPPED | OUTPATIENT
Start: 2025-06-26 | End: 2025-08-25

## (undated) DEVICE — GLOVE 7.5 PROTEXIS PI MICRO

## (undated) DEVICE — SOL SOD CHLORIDE 0.9% 10ML

## (undated) DEVICE — APPLICATOR CHLORAPREP CLR 10.5

## (undated) DEVICE — TRAY NERVE BLOCK

## (undated) DEVICE — HANDLE SURG LIGHT NONRIGID